# Patient Record
Sex: FEMALE | Race: WHITE | NOT HISPANIC OR LATINO
[De-identification: names, ages, dates, MRNs, and addresses within clinical notes are randomized per-mention and may not be internally consistent; named-entity substitution may affect disease eponyms.]

---

## 2020-11-25 ENCOUNTER — APPOINTMENT (OUTPATIENT)
Dept: OBGYN | Facility: CLINIC | Age: 45
End: 2020-11-25

## 2020-12-14 ENCOUNTER — OUTPATIENT (OUTPATIENT)
Dept: OUTPATIENT SERVICES | Facility: HOSPITAL | Age: 45
LOS: 1 days | Discharge: HOME | End: 2020-12-14
Payer: COMMERCIAL

## 2020-12-14 DIAGNOSIS — Z85.3 PERSONAL HISTORY OF MALIGNANT NEOPLASM OF BREAST: ICD-10-CM

## 2020-12-15 ENCOUNTER — RESULT REVIEW (OUTPATIENT)
Age: 45
End: 2020-12-15

## 2020-12-15 PROCEDURE — 88321 CONSLTJ&REPRT SLD PREP ELSWR: CPT

## 2020-12-16 LAB — SURGICAL PATHOLOGY STUDY: SIGNIFICANT CHANGE UP

## 2020-12-18 ENCOUNTER — APPOINTMENT (OUTPATIENT)
Dept: HEMATOLOGY ONCOLOGY | Facility: CLINIC | Age: 45
End: 2020-12-18

## 2021-01-28 ENCOUNTER — NON-APPOINTMENT (OUTPATIENT)
Age: 46
End: 2021-01-28

## 2021-01-29 ENCOUNTER — OUTPATIENT (OUTPATIENT)
Dept: OUTPATIENT SERVICES | Facility: HOSPITAL | Age: 46
LOS: 1 days | Discharge: HOME | End: 2021-01-29

## 2021-01-29 DIAGNOSIS — Z85.3 PERSONAL HISTORY OF MALIGNANT NEOPLASM OF BREAST: ICD-10-CM

## 2021-02-02 ENCOUNTER — APPOINTMENT (OUTPATIENT)
Dept: HEMATOLOGY ONCOLOGY | Facility: CLINIC | Age: 46
End: 2021-02-02
Payer: COMMERCIAL

## 2021-02-02 PROCEDURE — 99205 OFFICE O/P NEW HI 60 MIN: CPT | Mod: 95

## 2021-02-02 NOTE — HISTORY OF PRESENT ILLNESS
[Disease: _____________________] : Disease: [unfilled] [T: ___] : T[unfilled] [N: ___] : N[unfilled] [M: ___] : M[unfilled] [AJCC Stage: ____] : AJCC Stage: [unfilled] [de-identified] : Please see dictated initial consult note scanned into AEHR [de-identified] : ER+ SC+ her 2 lou -

## 2021-02-18 ENCOUNTER — APPOINTMENT (OUTPATIENT)
Dept: HEMATOLOGY ONCOLOGY | Facility: CLINIC | Age: 46
End: 2021-02-18
Payer: COMMERCIAL

## 2021-02-18 PROCEDURE — 99215 OFFICE O/P EST HI 40 MIN: CPT | Mod: 95

## 2021-02-19 NOTE — PHYSICAL EXAM
[Fully active, able to carry on all pre-disease performance without restriction] : Status 0 - Fully active, able to carry on all pre-disease performance without restriction [Normal] : affect appropriate [de-identified] : sclerae anicteric [de-identified] : No labored breathing

## 2021-02-19 NOTE — HISTORY OF PRESENT ILLNESS
[Home] : at home, [unfilled] , at the time of the visit. [Medical Office: ([facility])___] : at the medical office located in  [Verbal consent obtained from patient] : the patient, [unfilled] [Disease: _____________________] : Disease: [unfilled] [T: ___] : T[unfilled] [N: ___] : N[unfilled] [M: ___] : M[unfilled] [AJCC Stage: ____] : AJCC Stage: [unfilled] [de-identified] : The patient has h/o chronic "cystic breasts."  \par \par Her history of present illness began with a breast self-examination in approximately the third week of 11/2020 when she noticed a palpable mass in the upper right breast just above the nipple area.  She called her gynecologist and ultimately scheduled a diagnostic mammogram on 11/25/2020 with the finding of a spiculated mass in the supra-areolar region of the right breast, mid level to the chest wall, with suspicious microcalcifications identified, extending for approximately 4 cm with a suggestion of retraction with extension to the anterior surface of the breast approximately 2 cm inferior to the mass, suspicious microcalcifications identified within the mass; there were scattered groups of calcifications also identified in the left breast with questionable areas of nodularity.  A bilateral breast ultrasound was performed on that same date with a finding of a 4 cm spiculated mass in the right breast, 12 o'clock axis, 1 cm from the nipple, with a note that although other irregular areas of nodularity were present, tissue sampling of this mass was advised under ultrasound-guided core biopsy; the left breast had a 3 mm slightly irregular hypoechoic nodule at the 4 o'clock axis, 6 cm from the nipple with no area of suspicious architectural distortion or acoustical shadowing; no abnormal adenopathy was seen in either axilla.  On 12/02/2020, the patient underwent a right breast 12 o'clock axis core biopsy with a finding of moderately differentiated invasive duct carcinoma with focal squamous differentiation, with core biopsy specimen measuring 1.2 cm, estrogen receptor positive (80%), progesterone receptor positive (30%), and HER-2/lou negative (1+ staining).  She subsequently saw Dr. Flor Alfonso who then ordered a bilateral breast MRI which was performed on 12/15/2020 with a finding of a spiculated enhancing mass in the right upper central breast, middle depth which measured at least 3 x 4 cm corresponding to the biopsy-proven malignancy; there was nodule enhancement extending inferior, medial, and superior-lateral to the mass, which most likely represented a hematoma from the recent biopsy although satellite lesions could not be excluded; no evidence of contralateral breast disease was noted.  \par \par The patient ultimately proceeded on to a bilateral mastectomy, with the right mastectomy specimen showing 2 foci of invasive duct carcinoma, poorly differentiated, spanning 35 and 14 mm, respectively (the large focus demonstrated focal squamous differentiation), Mia-SBR score 9, margins negative, with non-extensive DCIS noted, evidence of lymphovascular invasion was identified, and 1/1 sentinel lymph node with a micrometastasis (1.6 mm), and 1/1 sentinel lymph node returning negative for metastasis.\par \par The patient had germline cancer genetic testing with a BRCA1/2 analyses with [employer] [facility] [employer] Plus RNA Insight testing which returned with no pathogenic mutation.\par \par The patient was seen by Dr. Gina Jacob in consultation who recommended adjuvant dose-dense ACT per patient's verbal report; I do not have a copy of those reports for my review.\par \par She saw me in initial consultation on 2/2/21. I had an extensive discussion with the patient regarding her recently diagnosed T2 N1mi M0, stage IIA breast cancer, noting the potential implications of her pathologic prognostic factors on the subsequent risk of developing local and metastatic recurrence.  In light of the above, I recommended adjuvant antiestrogen therapy, with tamoxifen should she be chemically premenopausal versus an aromatase inhibitor such as anastrozole if she is chemically menopausal, noting the potential risks, benefits, anticipated side effects, as well as potential reduction of these two approaches regarding the risk of developing metastatic recurrence.  She has been amenorrheic for 13 months, and I have recommended she have an FSH, LH, and estradiol level to better determine whether she is fully postmenopausal or perimenopausal at this time prior to making definitive treatment recommendations regarding antiestrogen therapy.  In addition, I have recommended that she allow us to send her tumor off for Oncotype DX testing, noting the implications of this molecular profiling tool on the subsequent decision-making as to whether to add adjuvant chemotherapy to adjuvant antiestrogen therapy.  Should this return with a low-risk score, I noted that I would recommend only antiestrogen therapy.  Should it return with a high-risk score, I would recommend adding adjuvant chemotherapy, specifically preliminarily discussing adjuvant TC chemotherapy every 3 weeks for 4 cycles with Onpro for hematologic support noting the potential risks, benefits, and anticipated side effects.  The option of using scalp cooling technologies to diminish or avoid chemotherapy-induced alopecia was also preliminarily discussed.  The patient has asked me why I did not recommend dose-dense ACT chemotherapy as Dr. Jacob had done, and I noted that it is a fine regimen, for which there is strong evidence for potential benefit in women with triple negative breast cancer, and those with lymph node positive breast cancer.  As a matter fact, from the recent TAILORx study, there is a minimal difference between TC and dose-dense ACT. \par \par The patient agreed to have her tumor tested for Oncotype DX and I requested for that assay to be sent/performed.  \par  [de-identified] : ER+ MS+ her 2 lou - [de-identified] : In the interim The OncotypeDX returned with a RS 31 = 24% ROR at 9  years with GUTIERREZ or AI alone and a group average absolute chemotherapy benefit of ~ 15%. \par \par The pt is seen today to discuss these results and treatment recommendations.\par \par She notes a good appetite stable weight and excellent performance status.

## 2021-02-22 ENCOUNTER — NON-APPOINTMENT (OUTPATIENT)
Age: 46
End: 2021-02-22

## 2021-02-23 ENCOUNTER — RESULT REVIEW (OUTPATIENT)
Age: 46
End: 2021-02-23

## 2021-02-23 ENCOUNTER — APPOINTMENT (OUTPATIENT)
Dept: HEMATOLOGY ONCOLOGY | Facility: CLINIC | Age: 46
End: 2021-02-23
Payer: COMMERCIAL

## 2021-02-23 VITALS
SYSTOLIC BLOOD PRESSURE: 109 MMHG | TEMPERATURE: 97.4 F | BODY MASS INDEX: 20.32 KG/M2 | DIASTOLIC BLOOD PRESSURE: 75 MMHG | HEART RATE: 76 BPM | RESPIRATION RATE: 17 BRPM | HEIGHT: 65.24 IN | WEIGHT: 123.46 LBS | OXYGEN SATURATION: 99 %

## 2021-02-23 LAB
BASOPHILS # BLD AUTO: 0.04 K/UL — SIGNIFICANT CHANGE UP (ref 0–0.2)
BASOPHILS NFR BLD AUTO: 0.7 % — SIGNIFICANT CHANGE UP (ref 0–2)
EOSINOPHIL # BLD AUTO: 0.15 K/UL — SIGNIFICANT CHANGE UP (ref 0–0.5)
EOSINOPHIL NFR BLD AUTO: 2.5 % — SIGNIFICANT CHANGE UP (ref 0–6)
HCT VFR BLD CALC: 39.6 % — SIGNIFICANT CHANGE UP (ref 34.5–45)
HGB BLD-MCNC: 13.2 G/DL — SIGNIFICANT CHANGE UP (ref 11.5–15.5)
IMM GRANULOCYTES NFR BLD AUTO: 0.3 % — SIGNIFICANT CHANGE UP (ref 0–1.5)
LYMPHOCYTES # BLD AUTO: 0.96 K/UL — LOW (ref 1–3.3)
LYMPHOCYTES # BLD AUTO: 15.7 % — SIGNIFICANT CHANGE UP (ref 13–44)
MCHC RBC-ENTMCNC: 29.6 PG — SIGNIFICANT CHANGE UP (ref 27–34)
MCHC RBC-ENTMCNC: 33.3 G/DL — SIGNIFICANT CHANGE UP (ref 32–36)
MCV RBC AUTO: 88.8 FL — SIGNIFICANT CHANGE UP (ref 80–100)
MONOCYTES # BLD AUTO: 0.5 K/UL — SIGNIFICANT CHANGE UP (ref 0–0.9)
MONOCYTES NFR BLD AUTO: 8.2 % — SIGNIFICANT CHANGE UP (ref 2–14)
NEUTROPHILS # BLD AUTO: 4.45 K/UL — SIGNIFICANT CHANGE UP (ref 1.8–7.4)
NEUTROPHILS NFR BLD AUTO: 72.6 % — SIGNIFICANT CHANGE UP (ref 43–77)
NRBC # BLD: 0 /100 WBCS — SIGNIFICANT CHANGE UP (ref 0–0)
PLATELET # BLD AUTO: 188 K/UL — SIGNIFICANT CHANGE UP (ref 150–400)
RBC # BLD: 4.46 M/UL — SIGNIFICANT CHANGE UP (ref 3.8–5.2)
RBC # FLD: 12.8 % — SIGNIFICANT CHANGE UP (ref 10.3–14.5)
WBC # BLD: 6.12 K/UL — SIGNIFICANT CHANGE UP (ref 3.8–10.5)
WBC # FLD AUTO: 6.12 K/UL — SIGNIFICANT CHANGE UP (ref 3.8–10.5)

## 2021-02-23 PROCEDURE — 99214 OFFICE O/P EST MOD 30 MIN: CPT

## 2021-02-23 PROCEDURE — 99072 ADDL SUPL MATRL&STAF TM PHE: CPT

## 2021-02-24 ENCOUNTER — OUTPATIENT (OUTPATIENT)
Dept: OUTPATIENT SERVICES | Facility: HOSPITAL | Age: 46
LOS: 1 days | Discharge: HOME | End: 2021-02-24

## 2021-02-24 DIAGNOSIS — Z85.3 PERSONAL HISTORY OF MALIGNANT NEOPLASM OF BREAST: ICD-10-CM

## 2021-02-24 LAB
ALBUMIN SERPL ELPH-MCNC: 4.7 G/DL
ALP BLD-CCNC: 66 U/L
ALT SERPL-CCNC: 11 U/L
ANION GAP SERPL CALC-SCNC: 13 MMOL/L
APTT BLD: 36.5 SEC
AST SERPL-CCNC: 17 U/L
BILIRUB SERPL-MCNC: 0.2 MG/DL
BUN SERPL-MCNC: 10 MG/DL
CALCIUM SERPL-MCNC: 9.8 MG/DL
CHLORIDE SERPL-SCNC: 102 MMOL/L
CO2 SERPL-SCNC: 25 MMOL/L
CREAT SERPL-MCNC: 0.7 MG/DL
GLUCOSE SERPL-MCNC: 101 MG/DL
HBV CORE IGG+IGM SER QL: NONREACTIVE
HBV SURFACE AB SER QL: NONREACTIVE
HBV SURFACE AG SER QL: NONREACTIVE
HCV AB SER QL: NONREACTIVE
HCV S/CO RATIO: 0.08 S/CO
INR PPP: 1.08 RATIO
POTASSIUM SERPL-SCNC: 4.5 MMOL/L
PROT SERPL-MCNC: 7.3 G/DL
PT BLD: 12.7 SEC
SARS-COV-2 N GENE NPH QL NAA+PROBE: NOT DETECTED
SODIUM SERPL-SCNC: 140 MMOL/L

## 2021-02-25 ENCOUNTER — APPOINTMENT (OUTPATIENT)
Dept: CARDIOLOGY | Facility: CLINIC | Age: 46
End: 2021-02-25
Payer: COMMERCIAL

## 2021-02-25 PROCEDURE — 99072 ADDL SUPL MATRL&STAF TM PHE: CPT

## 2021-02-25 PROCEDURE — 93306 TTE W/DOPPLER COMPLETE: CPT

## 2021-02-25 PROCEDURE — 93356 MYOCRD STRAIN IMG SPCKL TRCK: CPT

## 2021-02-26 ENCOUNTER — RESULT REVIEW (OUTPATIENT)
Age: 46
End: 2021-02-26

## 2021-02-26 ENCOUNTER — OUTPATIENT (OUTPATIENT)
Dept: OUTPATIENT SERVICES | Facility: HOSPITAL | Age: 46
LOS: 1 days | End: 2021-02-26
Payer: COMMERCIAL

## 2021-02-26 VITALS
OXYGEN SATURATION: 100 % | WEIGHT: 121.92 LBS | HEART RATE: 95 BPM | HEIGHT: 65 IN | RESPIRATION RATE: 16 BRPM | TEMPERATURE: 98 F | DIASTOLIC BLOOD PRESSURE: 78 MMHG | SYSTOLIC BLOOD PRESSURE: 120 MMHG

## 2021-02-26 VITALS
RESPIRATION RATE: 17 BRPM | DIASTOLIC BLOOD PRESSURE: 61 MMHG | SYSTOLIC BLOOD PRESSURE: 113 MMHG | OXYGEN SATURATION: 100 % | HEART RATE: 64 BPM

## 2021-02-26 DIAGNOSIS — C50.911 MALIGNANT NEOPLASM OF UNSPECIFIED SITE OF RIGHT FEMALE BREAST: ICD-10-CM

## 2021-02-26 PROCEDURE — C1788: CPT

## 2021-02-26 PROCEDURE — 76937 US GUIDE VASCULAR ACCESS: CPT

## 2021-02-26 PROCEDURE — C1769: CPT

## 2021-02-26 PROCEDURE — 77001 FLUOROGUIDE FOR VEIN DEVICE: CPT

## 2021-02-26 PROCEDURE — C1894: CPT

## 2021-02-26 PROCEDURE — 77001 FLUOROGUIDE FOR VEIN DEVICE: CPT | Mod: 26

## 2021-02-26 PROCEDURE — 36561 INSERT TUNNELED CV CATH: CPT

## 2021-02-26 PROCEDURE — 76937 US GUIDE VASCULAR ACCESS: CPT | Mod: 26

## 2021-02-26 RX ORDER — SODIUM CHLORIDE 9 MG/ML
1000 INJECTION, SOLUTION INTRAVENOUS
Refills: 0 | Status: DISCONTINUED | OUTPATIENT
Start: 2021-02-26 | End: 2021-03-12

## 2021-02-26 NOTE — ASU DISCHARGE PLAN (ADULT/PEDIATRIC) - NURSING INSTRUCTIONS
Please feel free to contact us at [phone] if any problems arise. After 6PM, Monday through Friday, on weekends and on holidays, please call [phone] and ask for the radiology resident on call to be paged.

## 2021-02-26 NOTE — PRE PROCEDURE NOTE - PRE PROCEDURE EVALUATION
Interventional Radiology Pre-Procedure Note    This is a 45y F with right breast cancer presenting for a left chest wall port insertion.     Procedure: Left chest wall venous access port insertion.    Diagnosis/Indication: Patient is a 45y old  Female who presents with a chief complaint of breast cancer    PAST MEDICAL & SURGICAL HISTORY:       Female    Allergies: amoxicillin (Rash)      LABS:      (see chart)      Plan:  -Left chest wall port insertion.  -Procedure/ risks/ benefits were explained, informed consent obtained from patient, verbalizes understanding.

## 2021-02-26 NOTE — ASU PATIENT PROFILE, ADULT - NS PRO LAST MENSTRUAL
Patient Education     Bacterial Vaginosis    You have a vaginal infection called bacterial vaginosis (BV). Both good and bad bacteria are present in a healthy vagina. BV occurs when these bacteria get out of balance. The number of bad bacteria increase. And the number of good bacteria decrease. Although BV is associated with sexual activity, it is not a sexually transmitted disease.  BV may or may not cause symptoms. If symptoms do occur, they can include:  · Thin, gray, milky-white, or sometimes green discharge  · Unpleasant odor or “fishy” smell  · Itching, burning, or pain in or around the vagina  It is not known what causes BV, but certain factors can make the problem more likely. This can include:  · Douching  · Having sex with a new partner  · Having sex with more than one partner  BV will sometimes go away on its own. But treatment is usually recommended. This is because untreated BV can increase the risk of more serious health problems such as:  · Pelvic inflammatory disease (PID)  ·  delivery (giving birth to a baby early if you’re pregnant)  · HIV and certain other sexually transmitted diseases (STDs)  · Infection after surgery on the reproductive organs  Home care  General care  · BV is most often treated with medicines called antibiotics. These may be given as pills or as a vaginal cream. If antibiotics are prescribed, be sure to use them exactly as directed. Also, be sure to complete all of the medicine, even if your symptoms go away.  · Don't douche or having sex during treatment.  · If you have sex with a female partner, ask your healthcare provider if she should also be treated.  Prevention  · Don't douche.  · Don't have sex. If you do have sex, then take steps to lower your risk:  ? Use condoms when having sex.  ? Limit the number of sexual partners you have.  Follow-up care  Follow up with your healthcare provider, or as advised.  When to seek medical advice  Call your healthcare provider  over a year ago right away if:  · You have a fever of 100.4ºF (38ºC) or higher, or as directed by your provider.  · Your symptoms worsen, or they don’t go away within a few days of starting treatment.  · You have new pain in the lower belly or pelvic region.  · You have side effects that bother you or a reaction to the pills or cream you’re prescribed.  · You or any partners you have sex with have new symptoms, such as a rash, joint pain, or sores.  Date Last Reviewed: 10/1/2017  © 6640-6168 [employer]. [address] [place], [place] [id number]. All rights reserved. This information is not intended as a substitute for professional medical care. Always follow your healthcare professional's instructions.           Patient Education     Preventing Vaginal Infection  These steps can help you stay comfortable during treatment of a vaginal infection. They also help prevent vaginal infections in the future.  Keeping a healthy balance  Factors that change the normal balance in the vagina can lead to a vaginal infection. To help keep the balance normal, try these tips:  · Change out of wet bathing suits and damp exercise clothes as soon as possible. Yeast thrive in a warm, moist environment.  · Avoid wearing tight pants. Choose cotton underwear and pantyhose that have a cotton crotch. Cotton keeps you cooler and drier than synthetics.  · Don't douche unless directed by your healthcare provider. Douching can destroy friendly bacteria and change the vagina's normal balance.  · Wipe from front to back after using the toilet. This prevents bacteria from spreading from the anus to the vulva.  · Wash the vulva with mild, unscented soap or with plain water.  · Wash your diaphragm, spermicide applicators, and sex toys with mild soap and water after use. Dry them thoroughly before putting them away.  · Change tampons often (every 2 hours to 4 hours). Leaving a tampon in for too long may disrupt the balance of vaginal  bacteria.  · Avoid vaginal sprays, scented toilet paper and soaps, and deodorant tampons or pads, which can cause vaginal irritation  Staying healthy overall  Good overall health can help you resist infection. To be healthier:  · Help protect yourself from STIs (sexually transmitted infections) by using latex condoms for intercourse. Ask your healthcare provider for more information about safer sex.  · Eat a variety of healthy foods.  · Exercise regularly.  · Get enough rest and sleep.  · Maintain a healthy weight. If you need to lose weight, ask your healthcare provider for advice on how to start.  Date Last Reviewed: 9/1/2017 © 2000-2018 [employer]. [address] [place] [address], [place] [address]. All rights reserved. This information is not intended as a substitute for professional medical care. Always follow your healthcare professional's instructions.           Patient Education     Why Have a Pap Test?  Early on, cervical changes don't cause symptoms. Often, the only way to know you have cervical changes is to do a Pap test. A Pap test can find these problems early, when they are easier to treat. Pap tests can also detect some infections of the cervix and vagina.  What is a Pap test?  A Pap test is a procedure that helps find changes in the cervix that may lead to cancer. The cervix is the part of the uterus that opens into the vagina. For this test, a small sample of cells is taken from the cervix. This is done in your healthcare provider’s office. The cells are then analyzed in a lab. A Pap test is a safe procedure. It takes just a few minutes and causes little or no discomfort.  The HPV connection  Human papillomavirus or HPV is a family of viruses that spread through skin contact. Certain types are almost always spread through sexual contact. Some HPV types cause genital warts (condyloma). But not all types of HPV cause visible symptoms. Certain types cause cell changes (dysplasia) in the cervix  that can lead to cancer. In fact, HPV infection is the most important risk factor for cervical cancer. Healthcare providers can now test for HPV. Testing for HPV is often done with the Pap test. That’s why it’s important to have Pap tests as recommended by your healthcare provider. This helps ensure that any abnormal cells will be found and treated before they become cancerous.  Who should have a Pap test and HPV test?  Ask your healthcare provider when to start having Pap tests, whether you should have an HPV test done at the same time, and how often to have them. Follow these guidelines from the American Cancer Society for cervical cancer screening:  · A first Pap test at age 21. And then every 3 years until age 29, HPV testing is not recommended during this time, though it may be done to follow-up on an abnormal Pap test.  · Starting at age 30, the preferred testing is a Pap test done with an HPV test every 5 years. This should be done until age 65. Another option for women in this 30 to 65 age group is to have just the Pap test done every 3 years.  · You may need a different screening schedule if you are at high risk for cervical cancer. Risk factors include having HIV, a weak immune system, or exposure to the medicine CRESCENCIO while your mother was pregnant with you. Talk with your healthcare provider about the best schedule for you.  · If you’re over 65 and have had regular screenings for the last 10 years with no abnormal results in the last 20 years, you may stop cervical cancer screening.  · If you had a hysterectomy that included removing your cervix, you can stop screening unless the hysterectomy was done to treat cervical cancer or pre-cancer. If you still have your cervix after the hysterectomy, you should continue screening according to the above guidelines.  · Routine testing does not need to be done each year. However, if your test is abnormal, your provider will let you know how often to be  tested.   · Women who have been vaccinated for HPV should still follow these guidelines.  · If you have had cervical cancer, talk to your healthcare provider about the screening plan that's best for you.  Date Last Reviewed: 9/1/2017 © 2000-2018 [employer]. [address] [place] [id number]. All rights reserved. This information is not intended as a substitute for professional medical care. Always follow your healthcare professional's instructions.           Patient Education     Pap  Does this test have other names?  Pap smear, cervical cytology, Papanicolaou test, Pap smear test, vaginal smear technique  What is this test?  This test checks the cells from inside the cervix for any changes that could lead to cancer. The cervix is the lower part of a woman's uterus that opens into the vagina.  This test is named after Marino Boggs MD, one of the healthcare providers who developed this technique of testing for cervical cancer.  Why do I need this test?  You may need this test as a screening test to look for cervical cancer or changes in cervical cells that might eventually lead to cancer. Major medical groups generally recommend that women get regular Pap tests every 3 years starting at age 21. Getting a regular Pap test can be life-saving. Cervical cancer is one of the most serious types of cancer in women.  If your test shows abnormal cells, your healthcare provider may be able to find and treat cervical problems right away, or stop cervical cancer before it becomes life-threatening. Pap tests can also diagnose serious infections and pelvic inflammation.   What other tests might I have along with this test?  You will likely have a pelvic exam along with this test. Depending on your age and other factors, your tissue samples may also be tested for human papillomavirus (HPV) infection at the same time your Pap test is done. Infection with some types of HPV puts you at risk for  cervical cancer.  If you have an abnormal Pap test result, your healthcare provider may order other tests. These may include:  · Colposcopy. Your cervix and vagina are looked at with a microscope called a colposcope, which magnifies any abnormal areas.  · Endocervical curettage. Cells are taken from the opening of your cervix with a spoon-shaped tool and looked at under a microscope. This may be done during the colposcopy.  · Biopsy. A small tissue sample is taken from your cervix and looked at under a microscope. This may be done during the colposcopy.   What do my test results mean?  Test results may vary depending on your age, gender, health history, the method used for the test, and other things. Your test results may not mean you have a problem. Ask your healthcare provider what your test results mean for you.   Your results will either be normal or abnormal. If you get an abnormal result, this usually does not mean that you have cancer. It often means a minor cervical problem. Your healthcare provider may do another Pap test to confirm the initial results. Or he or she may recommend other tests such as colposcopy.  Occasionally a lab test has a false-positive result. This means you do not have a cervical problem even though the test result shows you do.   How is this test done?  This test is done with a sample of cervical cells. For the test, you lie on your back with your knees bent and your feet in stirrups, then relax and spread your legs. As part of a pelvic exam, your healthcare provider first checks your vagina and reproductive organs for infections and health problems.  Then your provider uses a device called a speculum to open the vagina. The provider examines your cervix and scrapes off a few cells from inside your cervix.  Some women may have slight discomfort when the speculum is inserted.  Does this test pose any risks?  This test poses no known risks.  What might affect my test results?  Using  vaginal lubricants, cleansers, contraceptives, or creams may mask your symptoms. Avoid using vaginal douches and abstain from sex for 2 days before an exam. Using these products or having sex may wash away or disguise abnormal cervical cells.  How do I get ready for this test?  It may seem like a good idea to wash up before having a Pap test, but this can actually erase the signs of a health problem. For accurate test results, avoid having sex or using tampons, douches, vaginal creams, deodorant sprays and powders, and contraceptive foams and jellies for 2 days before your exam.  Don't have the test while you're menstruating. The ideal time to have a Pap test is 10 to 20 days after the first day of your last period.   Be sure your healthcare provider knows about all medicines, herbs, vitamins, and supplements you are taking. This includes medicines that don't need a prescription and any illicit drugs you may use.   © 3681-5459 [employer]. [address] [place] [address], [place] [address]. All rights reserved. This information is not intended as a substitute for professional medical care. Always follow your healthcare professional's instructions.

## 2021-03-01 ENCOUNTER — APPOINTMENT (OUTPATIENT)
Dept: HEMATOLOGY ONCOLOGY | Facility: CLINIC | Age: 46
End: 2021-03-01
Payer: COMMERCIAL

## 2021-03-01 DIAGNOSIS — Z23 ENCOUNTER FOR IMMUNIZATION: ICD-10-CM

## 2021-03-01 DIAGNOSIS — Z98.890 OTHER SPECIFIED POSTPROCEDURAL STATES: ICD-10-CM

## 2021-03-01 PROCEDURE — 90791 PSYCH DIAGNOSTIC EVALUATION: CPT | Mod: 95

## 2021-03-02 PROBLEM — Z23 COVID-19 VACCINE ADMINISTERED: Status: ACTIVE | Noted: 2021-03-01

## 2021-03-03 ENCOUNTER — RESULT REVIEW (OUTPATIENT)
Age: 46
End: 2021-03-03

## 2021-03-03 ENCOUNTER — APPOINTMENT (OUTPATIENT)
Dept: INFUSION THERAPY | Facility: HOSPITAL | Age: 46
End: 2021-03-03

## 2021-03-03 DIAGNOSIS — Z51.89 ENCOUNTER FOR OTHER SPECIFIED AFTERCARE: ICD-10-CM

## 2021-03-03 DIAGNOSIS — Z51.11 ENCOUNTER FOR ANTINEOPLASTIC CHEMOTHERAPY: ICD-10-CM

## 2021-03-03 DIAGNOSIS — R11.2 NAUSEA WITH VOMITING, UNSPECIFIED: ICD-10-CM

## 2021-03-03 LAB
BASOPHILS # BLD AUTO: 0.05 K/UL — SIGNIFICANT CHANGE UP (ref 0–0.2)
BASOPHILS NFR BLD AUTO: 0.9 % — SIGNIFICANT CHANGE UP (ref 0–2)
EOSINOPHIL # BLD AUTO: 0.41 K/UL — SIGNIFICANT CHANGE UP (ref 0–0.5)
EOSINOPHIL NFR BLD AUTO: 7.6 % — HIGH (ref 0–6)
HCT VFR BLD CALC: 39.6 % — SIGNIFICANT CHANGE UP (ref 34.5–45)
HGB BLD-MCNC: 13.4 G/DL — SIGNIFICANT CHANGE UP (ref 11.5–15.5)
IMM GRANULOCYTES NFR BLD AUTO: 2.4 % — HIGH (ref 0–1.5)
LYMPHOCYTES # BLD AUTO: 1.58 K/UL — SIGNIFICANT CHANGE UP (ref 1–3.3)
LYMPHOCYTES # BLD AUTO: 29.2 % — SIGNIFICANT CHANGE UP (ref 13–44)
MCHC RBC-ENTMCNC: 29.2 PG — SIGNIFICANT CHANGE UP (ref 27–34)
MCHC RBC-ENTMCNC: 33.8 G/DL — SIGNIFICANT CHANGE UP (ref 32–36)
MCV RBC AUTO: 86.3 FL — SIGNIFICANT CHANGE UP (ref 80–100)
MONOCYTES # BLD AUTO: 0.78 K/UL — SIGNIFICANT CHANGE UP (ref 0–0.9)
MONOCYTES NFR BLD AUTO: 14.4 % — HIGH (ref 2–14)
NEUTROPHILS # BLD AUTO: 2.47 K/UL — SIGNIFICANT CHANGE UP (ref 1.8–7.4)
NEUTROPHILS NFR BLD AUTO: 45.5 % — SIGNIFICANT CHANGE UP (ref 43–77)
NRBC # BLD: 0 /100 WBCS — SIGNIFICANT CHANGE UP (ref 0–0)
PLATELET # BLD AUTO: 200 K/UL — SIGNIFICANT CHANGE UP (ref 150–400)
RBC # BLD: 4.59 M/UL — SIGNIFICANT CHANGE UP (ref 3.8–5.2)
RBC # FLD: 12.5 % — SIGNIFICANT CHANGE UP (ref 10.3–14.5)
WBC # BLD: 5.42 K/UL — SIGNIFICANT CHANGE UP (ref 3.8–10.5)
WBC # FLD AUTO: 5.42 K/UL — SIGNIFICANT CHANGE UP (ref 3.8–10.5)

## 2021-03-04 ENCOUNTER — NON-APPOINTMENT (OUTPATIENT)
Age: 46
End: 2021-03-04

## 2021-03-04 DIAGNOSIS — C50.919 MALIGNANT NEOPLASM OF UNSPECIFIED SITE OF UNSPECIFIED FEMALE BREAST: ICD-10-CM

## 2021-03-04 DIAGNOSIS — R11.2 NAUSEA WITH VOMITING, UNSPECIFIED: ICD-10-CM

## 2021-03-04 DIAGNOSIS — Z51.11 ENCOUNTER FOR ANTINEOPLASTIC CHEMOTHERAPY: ICD-10-CM

## 2021-03-04 DIAGNOSIS — Z51.89 ENCOUNTER FOR OTHER SPECIFIED AFTERCARE: ICD-10-CM

## 2021-03-04 PROBLEM — K58.9 IRRITABLE BOWEL SYNDROME, UNSPECIFIED: Chronic | Status: ACTIVE | Noted: 2021-03-01

## 2021-03-04 PROBLEM — K58.9 IRRITABLE BOWEL SYNDROME WITHOUT DIARRHEA: Chronic | Status: ACTIVE | Noted: 2021-03-01

## 2021-03-05 ENCOUNTER — APPOINTMENT (OUTPATIENT)
Dept: HEMATOLOGY ONCOLOGY | Facility: CLINIC | Age: 46
End: 2021-03-05

## 2021-03-05 DIAGNOSIS — Z45.2 ENCOUNTER FOR ADJUSTMENT AND MANAGEMENT OF VASCULAR ACCESS DEVICE: ICD-10-CM

## 2021-03-05 DIAGNOSIS — C50.911 MALIGNANT NEOPLASM OF UNSPECIFIED SITE OF RIGHT FEMALE BREAST: ICD-10-CM

## 2021-03-05 DIAGNOSIS — F43.23 ADJUSTMENT DISORDER WITH MIXED ANXIETY AND DEPRESSED MOOD: ICD-10-CM

## 2021-03-08 NOTE — HISTORY OF PRESENT ILLNESS
[Disease: _____________________] : Disease: [unfilled] [T: ___] : T[unfilled] [N: ___] : N[unfilled] [M: ___] : M[unfilled] [AJCC Stage: ____] : AJCC Stage: [unfilled] [Home] : at home, [unfilled] , at the time of the visit. [Medical Office: ([facility])___] : at the medical office located in  [Verbal consent obtained from patient] : the patient, [unfilled] [de-identified] : The patient has h/o chronic "cystic breasts."  \par \par Her history of present illness began with a breast self-examination in approximately the third week of 11/2020 when she noticed a palpable mass in the upper right breast just above the nipple area.  She called her gynecologist and ultimately scheduled a diagnostic mammogram on 11/25/2020 with the finding of a spiculated mass in the supra-areolar region of the right breast, mid level to the chest wall, with suspicious microcalcifications identified, extending for approximately 4 cm with a suggestion of retraction with extension to the anterior surface of the breast approximately 2 cm inferior to the mass, suspicious microcalcifications identified within the mass; there were scattered groups of calcifications also identified in the left breast with questionable areas of nodularity.  A bilateral breast ultrasound was performed on that same date with a finding of a 4 cm spiculated mass in the right breast, 12 o'clock axis, 1 cm from the nipple, with a note that although other irregular areas of nodularity were present, tissue sampling of this mass was advised under ultrasound-guided core biopsy; the left breast had a 3 mm slightly irregular hypoechoic nodule at the 4 o'clock axis, 6 cm from the nipple with no area of suspicious architectural distortion or acoustical shadowing; no abnormal adenopathy was seen in either axilla.  On 12/02/2020, the patient underwent a right breast 12 o'clock axis core biopsy with a finding of moderately differentiated invasive duct carcinoma with focal squamous differentiation, with core biopsy specimen measuring 1.2 cm, estrogen receptor positive (80%), progesterone receptor positive (30%), and HER-2/lou negative (1+ staining).  She subsequently saw Dr. Flor Alfonso who then ordered a bilateral breast MRI which was performed on 12/15/2020 with a finding of a spiculated enhancing mass in the right upper central breast, middle depth which measured at least 3 x 4 cm corresponding to the biopsy-proven malignancy; there was nodule enhancement extending inferior, medial, and superior-lateral to the mass, which most likely represented a hematoma from the recent biopsy although satellite lesions could not be excluded; no evidence of contralateral breast disease was noted.  \par \par The patient ultimately proceeded on to a bilateral mastectomy, with the right mastectomy specimen showing 2 foci of invasive duct carcinoma, poorly differentiated, spanning 35 and 14 mm, respectively (the large focus demonstrated focal squamous differentiation), Mia-SBR score 9, margins negative, with non-extensive DCIS noted, evidence of lymphovascular invasion was identified, and 1/1 sentinel lymph node with a micrometastasis (1.6 mm), and 1/1 sentinel lymph node returning negative for metastasis.\par \par The patient had germline cancer genetic testing with a BRCA1/2 analyses with [employer] RNA Insight testing which returned with no pathogenic mutation.\par \par The patient was seen by Dr. Gina Jacob in consultation who recommended adjuvant dose-dense ACT per patient's verbal report; I do not have a copy of those reports for my review.\par \par She saw me in initial consultation on 2/2/21. I had an extensive discussion with the patient regarding her recently diagnosed T2 N1mi M0, stage IIA breast cancer, noting the potential implications of her pathologic prognostic factors on the subsequent risk of developing local and metastatic recurrence.  In light of the above, I recommended adjuvant antiestrogen therapy, with tamoxifen should she be chemically premenopausal versus an aromatase inhibitor such as anastrozole if she is chemically menopausal, noting the potential risks, benefits, anticipated side effects, as well as potential reduction of these two approaches regarding the risk of developing metastatic recurrence.  She has been amenorrheic for 13 months, and I have recommended she have an FSH, LH, and estradiol level to better determine whether she is fully postmenopausal or perimenopausal at this time prior to making definitive treatment recommendations regarding antiestrogen therapy.  In addition, I have recommended that she allow us to send her tumor off for Oncotype DX testing, noting the implications of this molecular profiling tool on the subsequent decision-making as to whether to add adjuvant chemotherapy to adjuvant antiestrogen therapy.  Should this return with a low-risk score, I noted that I would recommend only antiestrogen therapy.  Should it return with a high-risk score, I would recommend adding adjuvant chemotherapy, specifically preliminarily discussing adjuvant TC chemotherapy every 3 weeks for 4 cycles with Onpro for hematologic support noting the potential risks, benefits, and anticipated side effects.  The option of using scalp cooling technologies to diminish or avoid chemotherapy-induced alopecia was also preliminarily discussed.  The patient has asked me why I did not recommend dose-dense ACT chemotherapy as Dr. Jacob had done, and I noted that it is a fine regimen, for which there is strong evidence for potential benefit in women with triple negative breast cancer, and those with lymph node positive breast cancer.  As a matter fact, from the recent TAILORx study, there is a minimal difference between TC and dose-dense ACT. \par \par The patient agreed to have her tumor tested for Oncotype DX and I requested for that assay to be sent/performed.  \par  [de-identified] : ER+ [place]+ her 2 lou - [de-identified] : In the interim The OncotypeDX returned with a RS 31 = 24% ROR at 9  years with GUTIERREZ or AI alone and a group average absolute chemotherapy benefit of ~ 15%. \par REsults previously discussed on the phone, she is here today to discuss the treatment options again, and to make a decision on how to proceed.\par She is anxious regarding the prospect of starting chemotherapy and the risk of  recurrence.\par Otherwise well. Notes a good appetite, stable weight and an excellent perfromance status.\par

## 2021-03-08 NOTE — PHYSICAL EXAM
[Fully active, able to carry on all pre-disease performance without restriction] : Status 0 - Fully active, able to carry on all pre-disease performance without restriction [Normal] : grossly intact [de-identified] : s/p b/l mastectomies with reconstruction. Wound incisions healing well. [de-identified] : Tearful at times

## 2021-03-08 NOTE — END OF VISIT
[Time Spent: ___ minutes] : I have spent [unfilled] minutes of time on the encounter. [FreeTextEntry3] : seen and discussed with

## 2021-03-11 ENCOUNTER — NON-APPOINTMENT (OUTPATIENT)
Age: 46
End: 2021-03-11

## 2021-03-15 ENCOUNTER — NON-APPOINTMENT (OUTPATIENT)
Age: 46
End: 2021-03-15

## 2021-03-17 ENCOUNTER — RESULT REVIEW (OUTPATIENT)
Age: 46
End: 2021-03-17

## 2021-03-17 ENCOUNTER — APPOINTMENT (OUTPATIENT)
Dept: HEMATOLOGY ONCOLOGY | Facility: CLINIC | Age: 46
End: 2021-03-17
Payer: COMMERCIAL

## 2021-03-17 ENCOUNTER — APPOINTMENT (OUTPATIENT)
Dept: INFUSION THERAPY | Facility: HOSPITAL | Age: 46
End: 2021-03-17

## 2021-03-17 VITALS
SYSTOLIC BLOOD PRESSURE: 101 MMHG | DIASTOLIC BLOOD PRESSURE: 69 MMHG | OXYGEN SATURATION: 99 % | WEIGHT: 120.37 LBS | BODY MASS INDEX: 19.81 KG/M2 | HEART RATE: 86 BPM | HEIGHT: 65.24 IN | TEMPERATURE: 97.2 F | RESPIRATION RATE: 17 BRPM

## 2021-03-17 LAB
BASOPHILS # BLD AUTO: 0 K/UL — SIGNIFICANT CHANGE UP (ref 0–0.2)
BASOPHILS NFR BLD AUTO: 0 % — SIGNIFICANT CHANGE UP (ref 0–2)
EOSINOPHIL # BLD AUTO: 0 K/UL — SIGNIFICANT CHANGE UP (ref 0–0.5)
EOSINOPHIL NFR BLD AUTO: 0 % — SIGNIFICANT CHANGE UP (ref 0–6)
HCT VFR BLD CALC: 36.7 % — SIGNIFICANT CHANGE UP (ref 34.5–45)
HGB BLD-MCNC: 12.4 G/DL — SIGNIFICANT CHANGE UP (ref 11.5–15.5)
LYMPHOCYTES # BLD AUTO: 0.77 K/UL — LOW (ref 1–3.3)
LYMPHOCYTES # BLD AUTO: 12 % — LOW (ref 13–44)
MCHC RBC-ENTMCNC: 29.1 PG — SIGNIFICANT CHANGE UP (ref 27–34)
MCHC RBC-ENTMCNC: 33.8 G/DL — SIGNIFICANT CHANGE UP (ref 32–36)
MCV RBC AUTO: 86.2 FL — SIGNIFICANT CHANGE UP (ref 80–100)
MONOCYTES # BLD AUTO: 0.64 K/UL — SIGNIFICANT CHANGE UP (ref 0–0.9)
MONOCYTES NFR BLD AUTO: 10 % — SIGNIFICANT CHANGE UP (ref 2–14)
NEUTROPHILS # BLD AUTO: 4.99 K/UL — SIGNIFICANT CHANGE UP (ref 1.8–7.4)
NEUTROPHILS NFR BLD AUTO: 78 % — HIGH (ref 43–77)
NRBC # BLD: 0 /100 — SIGNIFICANT CHANGE UP (ref 0–0)
NRBC # BLD: SIGNIFICANT CHANGE UP /100 WBCS (ref 0–0)
PLAT MORPH BLD: NORMAL — SIGNIFICANT CHANGE UP
PLATELET # BLD AUTO: 144 K/UL — LOW (ref 150–400)
RBC # BLD: 4.26 M/UL — SIGNIFICANT CHANGE UP (ref 3.8–5.2)
RBC # FLD: 11.9 % — SIGNIFICANT CHANGE UP (ref 10.3–14.5)
RBC BLD AUTO: SIGNIFICANT CHANGE UP
WBC # BLD: 6.4 K/UL — SIGNIFICANT CHANGE UP (ref 3.8–10.5)
WBC # FLD AUTO: 6.4 K/UL — SIGNIFICANT CHANGE UP (ref 3.8–10.5)

## 2021-03-17 PROCEDURE — 99072 ADDL SUPL MATRL&STAF TM PHE: CPT

## 2021-03-17 PROCEDURE — 90834 PSYTX W PT 45 MINUTES: CPT

## 2021-03-17 PROCEDURE — 99214 OFFICE O/P EST MOD 30 MIN: CPT

## 2021-03-17 RX ORDER — ONDANSETRON 8 MG/1
8 TABLET ORAL EVERY 8 HOURS
Qty: 30 | Refills: 0 | Status: DISCONTINUED | COMMUNITY
Start: 2021-03-17 | End: 2021-03-17

## 2021-03-18 ENCOUNTER — NON-APPOINTMENT (OUTPATIENT)
Age: 46
End: 2021-03-18

## 2021-03-19 NOTE — PHYSICAL EXAM
[Fully active, able to carry on all pre-disease performance without restriction] : Status 0 - Fully active, able to carry on all pre-disease performance without restriction [Normal] : affect appropriate [de-identified] : s/p b/l mastectomies with reconstruction. Wound incisions healing well. [de-identified] : Tearful at times

## 2021-03-19 NOTE — HISTORY OF PRESENT ILLNESS
[Disease: _____________________] : Disease: [unfilled] [T: ___] : T[unfilled] [N: ___] : N[unfilled] [M: ___] : M[unfilled] [AJCC Stage: ____] : AJCC Stage: [unfilled] [de-identified] : The patient has h/o chronic "cystic breasts."  \par \par Her history of present illness began with a breast self-examination in approximately the third week of 11/2020 when she noticed a palpable mass in the upper right breast just above the nipple area.  She called her gynecologist and ultimately scheduled a diagnostic mammogram on 11/25/2020 with the finding of a spiculated mass in the supra-areolar region of the right breast, mid level to the chest wall, with suspicious microcalcifications identified, extending for approximately 4 cm with a suggestion of retraction with extension to the anterior surface of the breast approximately 2 cm inferior to the mass, suspicious microcalcifications identified within the mass; there were scattered groups of calcifications also identified in the left breast with questionable areas of nodularity.  A bilateral breast ultrasound was performed on that same date with a finding of a 4 cm spiculated mass in the right breast, 12 o'clock axis, 1 cm from the nipple, with a note that although other irregular areas of nodularity were present, tissue sampling of this mass was advised under ultrasound-guided core biopsy; the left breast had a 3 mm slightly irregular hypoechoic nodule at the 4 o'clock axis, 6 cm from the nipple with no area of suspicious architectural distortion or acoustical shadowing; no abnormal adenopathy was seen in either axilla.  On 12/02/2020, the patient underwent a right breast 12 o'clock axis core biopsy with a finding of moderately differentiated invasive duct carcinoma with focal squamous differentiation, with core biopsy specimen measuring 1.2 cm, estrogen receptor positive (80%), progesterone receptor positive (30%), and HER-2/lou negative (1+ staining).  She subsequently saw Dr. Flor Alfonso who then ordered a bilateral breast MRI which was performed on 12/15/2020 with a finding of a spiculated enhancing mass in the right upper central breast, middle depth which measured at least 3 x 4 cm corresponding to the biopsy-proven malignancy; there was nodule enhancement extending inferior, medial, and superior-lateral to the mass, which most likely represented a hematoma from the recent biopsy although satellite lesions could not be excluded; no evidence of contralateral breast disease was noted.  \par \par The patient ultimately proceeded on to a bilateral mastectomy, with the right mastectomy specimen showing 2 foci of invasive duct carcinoma, poorly differentiated, spanning 35 and 14 mm, respectively (the large focus demonstrated focal squamous differentiation), Mia-SBR score 9, margins negative, with non-extensive DCIS noted, evidence of lymphovascular invasion was identified, and 1/1 sentinel lymph node with a micrometastasis (1.6 mm), and 1/1 sentinel lymph node returning negative for metastasis.\par \par The patient had germline cancer genetic testing with a BRCA1/2 analyses with [employer] RNA Insight testing which returned with no pathogenic mutation.\par \par The patient was seen by Dr. Gina Jcaob in consultation who recommended adjuvant dose-dense ACT per patient's verbal report; I do not have a copy of those reports for my review.\par \par She saw me in initial consultation on 2/2/21. I had an extensive discussion with the patient regarding her recently diagnosed T2 N1mi M0, stage IIA breast cancer, noting the potential implications of her pathologic prognostic factors on the subsequent risk of developing local and metastatic recurrence.  In light of the above, I recommended adjuvant antiestrogen therapy, with tamoxifen should she be chemically premenopausal versus an aromatase inhibitor such as anastrozole if she is chemically menopausal, noting the potential risks, benefits, anticipated side effects, as well as potential reduction of these two approaches regarding the risk of developing metastatic recurrence.  She has been amenorrheic for 13 months, and I have recommended she have an FSH, LH, and estradiol level to better determine whether she is fully postmenopausal or perimenopausal at this time prior to making definitive treatment recommendations regarding antiestrogen therapy.  In addition, I have recommended that she allow us to send her tumor off for Oncotype DX testing, noting the implications of this molecular profiling tool on the subsequent decision-making as to whether to add adjuvant chemotherapy to adjuvant antiestrogen therapy.  Should this return with a low-risk score, I noted that I would recommend only antiestrogen therapy.  Should it return with a high-risk score, I would recommend adding adjuvant chemotherapy, specifically preliminarily discussing adjuvant TC chemotherapy every 3 weeks for 4 cycles with Onpro for hematologic support noting the potential risks, benefits, and anticipated side effects.  The option of using scalp cooling technologies to diminish or avoid chemotherapy-induced alopecia was also preliminarily discussed.  The patient has asked me why I did not recommend dose-dense ACT chemotherapy as Dr. Jacob had done, and I noted that it is a fine regimen, for which there is strong evidence for potential benefit in women with triple negative breast cancer, and those with lymph node positive breast cancer.  As a matter fact, from the recent TAILORx study, there is a minimal difference between TC and dose-dense ACT. \par In the interim The OncotypeDX returned with a RS 31 = 24% ROR at 9  years with GUTIERREZ or AI alone and a group average absolute chemotherapy benefit of ~ 15%. \par She wished to proceed with DD AC>>T. STarted on 3/3/21\par  [de-identified] : ER+ [place]+ her 2 lou - [de-identified] : On neoadjuvant chemotherapy regimen with dose dense doxorubicin/cyclophosphamide with onpro.\par S/p 1/4.\par \par 1. Extreme fatigue and exhaustion for for 3-4 days following treatment\par 2.On-call fellow post on C1D2 and was advised to take compazine around the clock, which she did x 3 days with consequent fatigue, dry mouth. Talked to her on the phone, regimen changed to prn. Now with improved symptoms. \par 3. Poor PO intake for 4-5 days following treatment. EAting a bland diet (rice cake, mashed potatoes and rice).\par ~ 3-4 lbs of weight loss. Now with an improved performance status, and starting to get back to her baseline.

## 2021-03-22 ENCOUNTER — NON-APPOINTMENT (OUTPATIENT)
Age: 46
End: 2021-03-22

## 2021-03-25 ENCOUNTER — APPOINTMENT (OUTPATIENT)
Dept: HEMATOLOGY ONCOLOGY | Facility: CLINIC | Age: 46
End: 2021-03-25
Payer: COMMERCIAL

## 2021-03-25 ENCOUNTER — NON-APPOINTMENT (OUTPATIENT)
Age: 46
End: 2021-03-25

## 2021-03-25 PROCEDURE — 90832 PSYTX W PT 30 MINUTES: CPT

## 2021-03-25 RX ORDER — DIPHENHYDRAMINE HYDROCHLORIDE AND LIDOCAINE HYDROCHLORIDE AND ALUMINUM HYDROXIDE AND MAGNESIUM HYDRO
KIT
Qty: 2 | Refills: 6 | Status: DISCONTINUED | COMMUNITY
Start: 2021-03-25 | End: 2021-03-25

## 2021-03-26 ENCOUNTER — APPOINTMENT (OUTPATIENT)
Dept: HEMATOLOGY ONCOLOGY | Facility: CLINIC | Age: 46
End: 2021-03-26
Payer: COMMERCIAL

## 2021-03-26 DIAGNOSIS — Z51.5 ENCOUNTER FOR PALLIATIVE CARE: ICD-10-CM

## 2021-03-26 PROCEDURE — 99214 OFFICE O/P EST MOD 30 MIN: CPT | Mod: 95

## 2021-03-26 RX ORDER — DIPHENHYDRAMINE HYDROCHLORIDE AND LIDOCAINE HYDROCHLORIDE AND ALUMINUM HYDROXIDE AND MAGNESIUM HYDRO
KIT
Qty: 2 | Refills: 6 | Status: DISCONTINUED | COMMUNITY
Start: 2021-03-25 | End: 2021-03-26

## 2021-03-27 ENCOUNTER — OUTPATIENT (OUTPATIENT)
Dept: OUTPATIENT SERVICES | Facility: HOSPITAL | Age: 46
LOS: 1 days | Discharge: HOME | End: 2021-03-27

## 2021-03-27 DIAGNOSIS — Z90.49 ACQUIRED ABSENCE OF OTHER SPECIFIED PARTS OF DIGESTIVE TRACT: Chronic | ICD-10-CM

## 2021-03-27 DIAGNOSIS — Z85.3 PERSONAL HISTORY OF MALIGNANT NEOPLASM OF BREAST: ICD-10-CM

## 2021-03-27 DIAGNOSIS — Z98.890 OTHER SPECIFIED POSTPROCEDURAL STATES: Chronic | ICD-10-CM

## 2021-03-31 ENCOUNTER — RESULT REVIEW (OUTPATIENT)
Age: 46
End: 2021-03-31

## 2021-03-31 ENCOUNTER — APPOINTMENT (OUTPATIENT)
Dept: INFUSION THERAPY | Facility: HOSPITAL | Age: 46
End: 2021-03-31

## 2021-03-31 ENCOUNTER — APPOINTMENT (OUTPATIENT)
Dept: HEMATOLOGY ONCOLOGY | Facility: CLINIC | Age: 46
End: 2021-03-31
Payer: COMMERCIAL

## 2021-03-31 VITALS
DIASTOLIC BLOOD PRESSURE: 71 MMHG | WEIGHT: 111.53 LBS | HEIGHT: 65.75 IN | BODY MASS INDEX: 18.14 KG/M2 | SYSTOLIC BLOOD PRESSURE: 110 MMHG | RESPIRATION RATE: 16 BRPM | OXYGEN SATURATION: 100 % | HEART RATE: 98 BPM | TEMPERATURE: 97.3 F

## 2021-03-31 DIAGNOSIS — R11.2 NAUSEA WITH VOMITING, UNSPECIFIED: ICD-10-CM

## 2021-03-31 DIAGNOSIS — Z51.89 ENCOUNTER FOR OTHER SPECIFIED AFTERCARE: ICD-10-CM

## 2021-03-31 DIAGNOSIS — Z51.11 ENCOUNTER FOR ANTINEOPLASTIC CHEMOTHERAPY: ICD-10-CM

## 2021-03-31 LAB
BASOPHILS # BLD AUTO: 0 K/UL — SIGNIFICANT CHANGE UP (ref 0–0.2)
BASOPHILS NFR BLD AUTO: 0 % — SIGNIFICANT CHANGE UP (ref 0–2)
EOSINOPHIL # BLD AUTO: 0 K/UL — SIGNIFICANT CHANGE UP (ref 0–0.5)
EOSINOPHIL NFR BLD AUTO: 0 % — SIGNIFICANT CHANGE UP (ref 0–6)
HCT VFR BLD CALC: 34.9 % — SIGNIFICANT CHANGE UP (ref 34.5–45)
HGB BLD-MCNC: 12.2 G/DL — SIGNIFICANT CHANGE UP (ref 11.5–15.5)
LYMPHOCYTES # BLD AUTO: 0.76 K/UL — LOW (ref 1–3.3)
LYMPHOCYTES # BLD AUTO: 5 % — LOW (ref 13–44)
MCHC RBC-ENTMCNC: 29.8 PG — SIGNIFICANT CHANGE UP (ref 27–34)
MCHC RBC-ENTMCNC: 35 G/DL — SIGNIFICANT CHANGE UP (ref 32–36)
MCV RBC AUTO: 85.3 FL — SIGNIFICANT CHANGE UP (ref 80–100)
MONOCYTES # BLD AUTO: 0.61 K/UL — SIGNIFICANT CHANGE UP (ref 0–0.9)
MONOCYTES NFR BLD AUTO: 4 % — SIGNIFICANT CHANGE UP (ref 2–14)
MYELOCYTES NFR BLD: 3 % — HIGH (ref 0–0)
NEUTROPHILS # BLD AUTO: 13.44 K/UL — HIGH (ref 1.8–7.4)
NEUTROPHILS NFR BLD AUTO: 88 % — HIGH (ref 43–77)
NRBC # BLD: 0 /100 — SIGNIFICANT CHANGE UP (ref 0–0)
NRBC # BLD: SIGNIFICANT CHANGE UP /100 WBCS (ref 0–0)
PLAT MORPH BLD: NORMAL — SIGNIFICANT CHANGE UP
PLATELET # BLD AUTO: 141 K/UL — LOW (ref 150–400)
RBC # BLD: 4.09 M/UL — SIGNIFICANT CHANGE UP (ref 3.8–5.2)
RBC # FLD: 12.6 % — SIGNIFICANT CHANGE UP (ref 10.3–14.5)
RBC BLD AUTO: SIGNIFICANT CHANGE UP
WBC # BLD: 15.27 K/UL — HIGH (ref 3.8–10.5)
WBC # FLD AUTO: 15.27 K/UL — HIGH (ref 3.8–10.5)

## 2021-03-31 PROCEDURE — 99214 OFFICE O/P EST MOD 30 MIN: CPT

## 2021-03-31 PROCEDURE — 99072 ADDL SUPL MATRL&STAF TM PHE: CPT

## 2021-04-01 ENCOUNTER — OUTPATIENT (OUTPATIENT)
Dept: OUTPATIENT SERVICES | Facility: HOSPITAL | Age: 46
LOS: 1 days | Discharge: HOME | End: 2021-04-01

## 2021-04-01 DIAGNOSIS — C50.919 MALIGNANT NEOPLASM OF UNSPECIFIED SITE OF UNSPECIFIED FEMALE BREAST: ICD-10-CM

## 2021-04-01 DIAGNOSIS — Z85.3 PERSONAL HISTORY OF MALIGNANT NEOPLASM OF BREAST: ICD-10-CM

## 2021-04-01 DIAGNOSIS — Z90.49 ACQUIRED ABSENCE OF OTHER SPECIFIED PARTS OF DIGESTIVE TRACT: Chronic | ICD-10-CM

## 2021-04-01 DIAGNOSIS — Z98.890 OTHER SPECIFIED POSTPROCEDURAL STATES: Chronic | ICD-10-CM

## 2021-04-02 NOTE — END OF VISIT
[Time Spent: ___ minutes] : I have spent [unfilled] minutes of time on the encounter. [FreeTextEntry3] : discussed with

## 2021-04-02 NOTE — REASON FOR VISIT
[Follow-Up Visit] : a follow-up [Pre-Treatment Visit] : a pre-treatment [FreeTextEntry2] : Breast Cancer on adjuvant chemotherapy

## 2021-04-02 NOTE — HISTORY OF PRESENT ILLNESS
[Disease: _____________________] : Disease: [unfilled] [T: ___] : T[unfilled] [N: ___] : N[unfilled] [M: ___] : M[unfilled] [AJCC Stage: ____] : AJCC Stage: [unfilled] [de-identified] : The patient has h/o chronic "cystic breasts."  \par \par Her history of present illness began with a breast self-examination in approximately the third week of 11/2020 when she noticed a palpable mass in the upper right breast just above the nipple area.  She called her gynecologist and ultimately scheduled a diagnostic mammogram on 11/25/2020 with the finding of a spiculated mass in the supra-areolar region of the right breast, mid level to the chest wall, with suspicious microcalcifications identified, extending for approximately 4 cm with a suggestion of retraction with extension to the anterior surface of the breast approximately 2 cm inferior to the mass, suspicious microcalcifications identified within the mass; there were scattered groups of calcifications also identified in the left breast with questionable areas of nodularity.  A bilateral breast ultrasound was performed on that same date with a finding of a 4 cm spiculated mass in the right breast, 12 o'clock axis, 1 cm from the nipple, with a note that although other irregular areas of nodularity were present, tissue sampling of this mass was advised under ultrasound-guided core biopsy; the left breast had a 3 mm slightly irregular hypoechoic nodule at the 4 o'clock axis, 6 cm from the nipple with no area of suspicious architectural distortion or acoustical shadowing; no abnormal adenopathy was seen in either axilla.  On 12/02/2020, the patient underwent a right breast 12 o'clock axis core biopsy with a finding of moderately differentiated invasive duct carcinoma with focal squamous differentiation, with core biopsy specimen measuring 1.2 cm, estrogen receptor positive (80%), progesterone receptor positive (30%), and HER-2/lou negative (1+ staining).  She subsequently saw Dr. Flor lAfonso who then ordered a bilateral breast MRI which was performed on 12/15/2020 with a finding of a spiculated enhancing mass in the right upper central breast, middle depth which measured at least 3 x 4 cm corresponding to the biopsy-proven malignancy; there was nodule enhancement extending inferior, medial, and superior-lateral to the mass, which most likely represented a hematoma from the recent biopsy although satellite lesions could not be excluded; no evidence of contralateral breast disease was noted.  \par \par The patient ultimately proceeded on to a bilateral mastectomy, with the right mastectomy specimen showing 2 foci of invasive duct carcinoma, poorly differentiated, spanning 35 and 14 mm, respectively (the large focus demonstrated focal squamous differentiation), Mia-SBR score 9, margins negative, with non-extensive DCIS noted, evidence of lymphovascular invasion was identified, and 1/1 sentinel lymph node with a micrometastasis (1.6 mm), and 1/1 sentinel lymph node returning negative for metastasis.\par \par The patient had germline cancer genetic testing with a BRCA1/2 analyses with [employer] Cancer [employer] RNA Insight testing which returned with no pathogenic mutation.\par \par The patient was seen by Dr. Gina Jacob in consultation who recommended adjuvant dose-dense ACT per patient's verbal report; I do not have a copy of those reports for my review.\par \par She saw me in initial consultation on 2/2/21. I had an extensive discussion with the patient regarding her recently diagnosed T2 N1mi M0, stage IIA breast cancer, noting the potential implications of her pathologic prognostic factors on the subsequent risk of developing local and metastatic recurrence.  In light of the above, I recommended adjuvant antiestrogen therapy, with tamoxifen should she be chemically premenopausal versus an aromatase inhibitor such as anastrozole if she is chemically menopausal, noting the potential risks, benefits, anticipated side effects, as well as potential reduction of these two approaches regarding the risk of developing metastatic recurrence.  She has been amenorrheic for 13 months, and I have recommended she have an FSH, LH, and estradiol level to better determine whether she is fully postmenopausal or perimenopausal at this time prior to making definitive treatment recommendations regarding antiestrogen therapy.  In addition, I have recommended that she allow us to send her tumor off for Oncotype DX testing, noting the implications of this molecular profiling tool on the subsequent decision-making as to whether to add adjuvant chemotherapy to adjuvant antiestrogen therapy.  Should this return with a low-risk score, I noted that I would recommend only antiestrogen therapy.  Should it return with a high-risk score, I would recommend adding adjuvant chemotherapy, specifically preliminarily discussing adjuvant TC chemotherapy every 3 weeks for 4 cycles with Onpro for hematologic support noting the potential risks, benefits, and anticipated side effects.  The option of using scalp cooling technologies to diminish or avoid chemotherapy-induced alopecia was also preliminarily discussed.  The patient has asked me why I did not recommend dose-dense ACT chemotherapy as Dr. Jacob had done, and I noted that it is a fine regimen, for which there is strong evidence for potential benefit in women with triple negative breast cancer, and those with lymph node positive breast cancer.  As a matter fact, from the recent TAILORx study, there is a minimal difference between TC and dose-dense ACT. \par In the interim The OncotypeDX returned with a RS 31 = 24% ROR at 9  years with GUTIERREZ or AI alone and a group average absolute chemotherapy benefit of ~ 15%. \par She wished to proceed with DD AC>>T. STarted on 3/3/21\par  [de-identified] : ER+ [place]+ her 2 lou - [de-identified] : On neoadjuvant chemotherapy regimen with dose dense doxorubicin/cyclophosphamide with onpro.\par S/p 2/4.\par \par 1. Extreme fatigue and exhaustion for for 3-4 days following treatment\par 2. Nausea with relief when administering ondansetron PRN.\par 3. Poor PO intake, patient states food tastes like "rotten garbage." Dysgeusia noted. Patient reports eating small meals throughout the day (eggs, chicken, potatoes, vegetables). \par 4. Weight loss: Patient has lost 9 pounds since 3/17/21. Patient attributes weight loss to poor PO intake due to dysgeusia. Patient educated on importance of balance meals and calorie intake, patient has met with nutritionist Bri Shah and will continue to work with the nutritionist, patient verbalizes understanding and agrees to increase calorie intake and meal portions. \par 5. Poor coping, patient is tearful, patient reports increase in anxiety and depression due to cancer diagnosis and treatment. Patient is seeing a psychotherapist and psychiatrist Dr Phillips. Patient was recently prescribed Citalopram, patient reports today is the first day on citalopram. Patient reports using xanax TID, patient reports alleviation of anxiety mid-afternoon due to the use of xanax.

## 2021-04-02 NOTE — PHYSICAL EXAM
[Restricted in physically strenuous activity but ambulatory and able to carry out work of a light or sedentary nature] : Status 1- Restricted in physically strenuous activity but ambulatory and able to carry out work of a light or sedentary nature, e.g., light house work, office work [Normal] : normal spine exam without palpable tenderness, no kyphosis or scoliosis [de-identified] : s/p b/l mastectomies with reconstruction. Wound incisions healing well. [de-identified] : Tearful at times

## 2021-04-05 ENCOUNTER — APPOINTMENT (OUTPATIENT)
Dept: HEMATOLOGY ONCOLOGY | Facility: CLINIC | Age: 46
End: 2021-04-05

## 2021-04-06 PROBLEM — Z51.5 ENCOUNTER FOR PALLIATIVE CARE: Status: ACTIVE | Noted: 2021-04-06

## 2021-04-06 NOTE — HISTORY OF PRESENT ILLNESS
[Home] : at home, [unfilled] , at the time of the visit. [Medical Office: ([facility])___] : at the medical office located in  [Verbal consent obtained from patient] : the patient, [unfilled] [FreeTextEntry1] : 45yoF with recently diagnosed Stage IIA breast cancer presents via telemedicine for initial palliative care visit, referred by Oncology team. Patient seen via Telemedicine. \par PMH also significant for GERD, IBS\par \par Recently started on Doxorubicin, Cyclophosphamide. \par \par Patient reports problems with dysgeusia that developed recently. Initially started as a mild alteration in her taste but has progressed to the point that food tastes like "rotten garbage" to her. She has tried using baking soda and salt rinses to no avail. Biotene helps a bit. Water tastes particularly badly and she has been trying hard to stay hydrated. She forces herself to eat but it takes a while to get down food, in lesser quantities than her baseline.  \par \par She has had multiple issues in her life in recent years - miscarriage, divorce, mother had cancer and now she has cancer. She recently started seeing a psychiatrist, Dr. Freddy Guerin. \par She started using Celexa just yesterday. She was prescribed this a month ago but had been hesitant to take it.  She had been using alprazolam 0.5mg PRN but finally when she started losing her hair this put her over the edge into using it. When her physical symptoms are worse, this worsens her mood further. \par She does not look forward to anything lately. She views her cancer treatment course as insurmountable.\par \par Low back pain - uses PRN Tylenol, heating pad.\par \par ROS:\par +10lb weight loss \par +recently developed little white pimples on her tongue, food/drink can burn her tongue. \par +nausea - compazine did not sit well with her. has been using zofran PRN\par +uses alprazolam QHS and this helps her to get 6-7 hours of sleep.\par +fatigue - tries to be intentional about moving/ staying physically active to combat the fatigue. \par +IBS with diarrhea - has been using probiotics for a while which has been very helpful for her.\par \par Patient is , lives alone. She has been staying with her mother sometimes because being alone has been difficult for her. She was working as a research analyst, previously was a school psychologist. Not working at present. \par \par Psychiatrist: Dr. Freddy Guerin\par \par I-Stop Ref#: [id number]

## 2021-04-06 NOTE — PHYSICAL EXAM
[General Appearance - Alert] : alert [General Appearance - In No Acute Distress] : in no acute distress [Oriented To Time, Place, And Person] : oriented to person, place, and time [FreeTextEntry1] : anxious

## 2021-04-06 NOTE — ASSESSMENT
[FreeTextEntry1] : 45yoF with:\par \par 1. Breast Cancer - On Cyclophosphamide, Doxorubicin. Follow up with Med Onc. \par \par 2. Dysgeusia - Recommend initiation of zinc 100mg daily. She can try using chewable "Miracle Berry" tablets. \par C/w biotene rinse\par Patient to obtain magic mouthwash for developing mucositis. \par \par 3. Treatment-related pain - c/w PRN Tylenol \par Medical cannabis certification completed today. Provided cannabis education, overview of state program, and discussed adverse effects in detail. Counseled that vaporized cannabis is not the preferred route of administration due to the fact that both short-term and long-term risks associated with vaporizing oils are not yet fully understood. Recommend starting with 1:1 THC:CBD formulation at low dose of THC (~2mg/dose).\par \par 4. Anxiety - Patient is following with psycho-oncology, using alprazolam QHS.  Discussed how CBD could be useful with managing her anxiety. Provided empathetic listening and validation of her worries. \par \par 5. Encounter for Palliative Care - Emotional support provided.\par \par Follow up in 1 month, call sooner with questions or issues.\par

## 2021-04-07 ENCOUNTER — APPOINTMENT (OUTPATIENT)
Dept: HEMATOLOGY ONCOLOGY | Facility: CLINIC | Age: 46
End: 2021-04-07

## 2021-04-08 ENCOUNTER — APPOINTMENT (OUTPATIENT)
Dept: HEMATOLOGY ONCOLOGY | Facility: CLINIC | Age: 46
End: 2021-04-08
Payer: COMMERCIAL

## 2021-04-08 PROCEDURE — 99214 OFFICE O/P EST MOD 30 MIN: CPT | Mod: 95

## 2021-04-08 NOTE — PHYSICAL EXAM
[Restricted in physically strenuous activity but ambulatory and able to carry out work of a light or sedentary nature] : Status 1- Restricted in physically strenuous activity but ambulatory and able to carry out work of a light or sedentary nature, e.g., light house work, office work [Thin] : thin [de-identified] : sclerae anicteric [de-identified] : no labored breathing  [de-identified] : flat affect

## 2021-04-08 NOTE — HISTORY OF PRESENT ILLNESS
[Home] : at home, [unfilled] , at the time of the visit. [Medical Office: ([facility])___] : at the medical office located in  [Verbal consent obtained from patient] : the patient, [unfilled] [Disease: _____________________] : Disease: [unfilled] [T: ___] : T[unfilled] [N: ___] : N[unfilled] [M: ___] : M[unfilled] [AJCC Stage: ____] : AJCC Stage: [unfilled] [de-identified] : The patient has h/o chronic "cystic breasts."  \par \par Her history of present illness began with a breast self-examination in approximately the third week of 11/2020 when she noticed a palpable mass in the upper right breast just above the nipple area.  She called her gynecologist and ultimately scheduled a diagnostic mammogram on 11/25/2020 with the finding of a spiculated mass in the supra-areolar region of the right breast, mid level to the chest wall, with suspicious microcalcifications identified, extending for approximately 4 cm with a suggestion of retraction with extension to the anterior surface of the breast approximately 2 cm inferior to the mass, suspicious microcalcifications identified within the mass; there were scattered groups of calcifications also identified in the left breast with questionable areas of nodularity.  A bilateral breast ultrasound was performed on that same date with a finding of a 4 cm spiculated mass in the right breast, 12 o'clock axis, 1 cm from the nipple, with a note that although other irregular areas of nodularity were present, tissue sampling of this mass was advised under ultrasound-guided core biopsy; the left breast had a 3 mm slightly irregular hypoechoic nodule at the 4 o'clock axis, 6 cm from the nipple with no area of suspicious architectural distortion or acoustical shadowing; no abnormal adenopathy was seen in either axilla.  On 12/02/2020, the patient underwent a right breast 12 o'clock axis core biopsy with a finding of moderately differentiated invasive duct carcinoma with focal squamous differentiation, with core biopsy specimen measuring 1.2 cm, estrogen receptor positive (80%), progesterone receptor positive (30%), and HER-2/lou negative (1+ staining).  She subsequently saw Dr. Flor Alfonso who then ordered a bilateral breast MRI which was performed on 12/15/2020 with a finding of a spiculated enhancing mass in the right upper central breast, middle depth which measured at least 3 x 4 cm corresponding to the biopsy-proven malignancy; there was nodule enhancement extending inferior, medial, and superior-lateral to the mass, which most likely represented a hematoma from the recent biopsy although satellite lesions could not be excluded; no evidence of contralateral breast disease was noted.  \par \par The patient ultimately proceeded on to a bilateral mastectomy, with the right mastectomy specimen showing 2 foci of invasive duct carcinoma, poorly differentiated, spanning 35 and 14 mm, respectively (the large focus demonstrated focal squamous differentiation), Mia-SBR score 9, margins negative, with non-extensive DCIS noted, evidence of lymphovascular invasion was identified, and 1/1 sentinel lymph node with a micrometastasis (1.6 mm), and 1/1 sentinel lymph node returning negative for metastasis.\par \par The patient had germline cancer genetic testing with a BRCA1/2 analyses with [employer] Next Cancer Plus RNA Insight testing which returned with no pathogenic mutation.\par \par The patient was seen by Dr. Gina Jacob in consultation who recommended adjuvant dose-dense ACT per patient's verbal report; I do not have a copy of those reports for my review.\par \par She saw me in initial consultation on 2/2/21. I had an extensive discussion with the patient regarding her recently diagnosed T2 N1mi M0, stage IIA breast cancer, noting the potential implications of her pathologic prognostic factors on the subsequent risk of developing local and metastatic recurrence.  In light of the above, I recommended adjuvant antiestrogen therapy, with tamoxifen should she be chemically premenopausal versus an aromatase inhibitor such as anastrozole if she is chemically menopausal, noting the potential risks, benefits, anticipated side effects, as well as potential reduction of these two approaches regarding the risk of developing metastatic recurrence.  She has been amenorrheic for 13 months, and I have recommended she have an FSH, LH, and estradiol level to better determine whether she is fully postmenopausal or perimenopausal at this time prior to making definitive treatment recommendations regarding antiestrogen therapy.  In addition, I have recommended that she allow us to send her tumor off for Oncotype DX testing, noting the implications of this molecular profiling tool on the subsequent decision-making as to whether to add adjuvant chemotherapy to adjuvant antiestrogen therapy.  Should this return with a low-risk score, I noted that I would recommend only antiestrogen therapy.  Should it return with a high-risk score, I would recommend adding adjuvant chemotherapy, specifically preliminarily discussing adjuvant TC chemotherapy every 3 weeks for 4 cycles with Onpro for hematologic support noting the potential risks, benefits, and anticipated side effects.  The option of using scalp cooling technologies to diminish or avoid chemotherapy-induced alopecia was also preliminarily discussed.  The patient has asked me why I did not recommend dose-dense ACT chemotherapy as Dr. Jacob had done, and I noted that it is a fine regimen, for which there is strong evidence for potential benefit in women with triple negative breast cancer, and those with lymph node positive breast cancer.  As a matter fact, from the recent TAILORx study, there is a minimal difference between TC and dose-dense ACT. \par In the interim The OncotypeDX returned with a RS 31 = 24% ROR at 9  years with GUTIERREZ or AI alone and a group average absolute chemotherapy benefit of ~ 15%. \par She wished to proceed with DD AC>>T. STarted on 3/3/21\par  [de-identified] : ER+ GA+ her 2 lou - [de-identified] : On neoadjuvant chemotherapy regimen with dose dense doxorubicin/cyclophosphamide with Onpro.\par \par S/p 3/4 on 3/31/21.\par \par On 3/31/21 she c/o : \par 1. Extreme fatigue and exhaustion for for 3-4 days following treatment\par 2. Nausea with relief when administering ondansetron PRN.\par 3. Poor PO intake, patient states food tastes like "rotten garbage." Dysgeusia noted. Patient reports eating small meals throughout the day (eggs, chicken, potatoes, vegetables). \par 4. Weight loss: Patient has lost 9 pounds since 3/17/21. Patient attributes weight loss to poor PO intake due to dysgeusia. Patient educated on importance of balance meals and calorie intake, patient has met with nutritionist Bri Shah and will continue to work with the nutritionist, patient verbalizes understanding and agrees to increase calorie intake and meal portions. \par 5. Poor coping, patient is tearful, patient reports increase in anxiety and depression due to cancer diagnosis and treatment. Patient is seeing a psychotherapist and psychiatrist Dr Phillips. Patient was recently prescribed Citalopram, patient reports today is the first day on citalopram. Patient reports using xanax TID, patient reports alleviation of anxiety mid-afternoon due to the use of xanax.\par \par Seen today in follow up out of concern for her treatment related side effects and especially her wt loss.\par She notes continued dysgeusia "everything tastes like rotten food" which make it difficult for her to eat although she persists. She notes that she usually snacks a lot, eats sweets a lot between meals and with her dysgeusia cannot tolerate any of these snacks. She had low grade nausea days 3-4 for which she took ondansetron with good effect. She notes profound fatigue barbara days 3-6 with associated generalized malaise. She had 2 loose BMS.  She indicates that she likely lost another 1-2 lbs in the interim. her appetite is now picking yo again and she is eating more again.   \par

## 2021-04-14 ENCOUNTER — RESULT REVIEW (OUTPATIENT)
Age: 46
End: 2021-04-14

## 2021-04-14 ENCOUNTER — LABORATORY RESULT (OUTPATIENT)
Age: 46
End: 2021-04-14

## 2021-04-14 ENCOUNTER — APPOINTMENT (OUTPATIENT)
Dept: HEMATOLOGY ONCOLOGY | Facility: CLINIC | Age: 46
End: 2021-04-14
Payer: COMMERCIAL

## 2021-04-14 ENCOUNTER — APPOINTMENT (OUTPATIENT)
Dept: INFUSION THERAPY | Facility: HOSPITAL | Age: 46
End: 2021-04-14

## 2021-04-14 VITALS
HEART RATE: 77 BPM | TEMPERATURE: 97 F | HEIGHT: 65.75 IN | SYSTOLIC BLOOD PRESSURE: 98 MMHG | DIASTOLIC BLOOD PRESSURE: 68 MMHG | RESPIRATION RATE: 14 BRPM | BODY MASS INDEX: 17.93 KG/M2 | WEIGHT: 110.23 LBS

## 2021-04-14 LAB
BASOPHILS # BLD AUTO: 0.11 K/UL — SIGNIFICANT CHANGE UP (ref 0–0.2)
BASOPHILS NFR BLD AUTO: 1 % — SIGNIFICANT CHANGE UP (ref 0–2)
EOSINOPHIL # BLD AUTO: 0 K/UL — SIGNIFICANT CHANGE UP (ref 0–0.5)
EOSINOPHIL NFR BLD AUTO: 0 % — SIGNIFICANT CHANGE UP (ref 0–6)
HCT VFR BLD CALC: 32.5 % — LOW (ref 34.5–45)
HGB BLD-MCNC: 11.1 G/DL — LOW (ref 11.5–15.5)
LYMPHOCYTES # BLD AUTO: 0.63 K/UL — LOW (ref 1–3.3)
LYMPHOCYTES # BLD AUTO: 6 % — LOW (ref 13–44)
MCHC RBC-ENTMCNC: 29.8 PG — SIGNIFICANT CHANGE UP (ref 27–34)
MCHC RBC-ENTMCNC: 34.2 G/DL — SIGNIFICANT CHANGE UP (ref 32–36)
MCV RBC AUTO: 87.1 FL — SIGNIFICANT CHANGE UP (ref 80–100)
METAMYELOCYTES # FLD: 2 % — HIGH (ref 0–0)
MONOCYTES # BLD AUTO: 0.32 K/UL — SIGNIFICANT CHANGE UP (ref 0–0.9)
MONOCYTES NFR BLD AUTO: 3 % — SIGNIFICANT CHANGE UP (ref 2–14)
MYELOCYTES NFR BLD: 2 % — HIGH (ref 0–0)
NEUTROPHILS # BLD AUTO: 9.1 K/UL — HIGH (ref 1.8–7.4)
NEUTROPHILS NFR BLD AUTO: 84 % — HIGH (ref 43–77)
NEUTS BAND # BLD: 2 % — SIGNIFICANT CHANGE UP (ref 0–8)
NRBC # BLD: 0 /100 — SIGNIFICANT CHANGE UP (ref 0–0)
NRBC # BLD: SIGNIFICANT CHANGE UP /100 WBCS (ref 0–0)
PLAT MORPH BLD: NORMAL — SIGNIFICANT CHANGE UP
PLATELET # BLD AUTO: 162 K/UL — SIGNIFICANT CHANGE UP (ref 150–400)
RBC # BLD: 3.73 M/UL — LOW (ref 3.8–5.2)
RBC # FLD: 13.8 % — SIGNIFICANT CHANGE UP (ref 10.3–14.5)
RBC BLD AUTO: SIGNIFICANT CHANGE UP
WBC # BLD: 10.58 K/UL — HIGH (ref 3.8–10.5)
WBC # FLD AUTO: 10.58 K/UL — HIGH (ref 3.8–10.5)

## 2021-04-14 PROCEDURE — 99072 ADDL SUPL MATRL&STAF TM PHE: CPT

## 2021-04-14 PROCEDURE — 99214 OFFICE O/P EST MOD 30 MIN: CPT

## 2021-04-15 NOTE — HISTORY OF PRESENT ILLNESS
[Disease: _____________________] : Disease: [unfilled] [T: ___] : T[unfilled] [N: ___] : N[unfilled] [M: ___] : M[unfilled] [AJCC Stage: ____] : AJCC Stage: [unfilled] [Home] : at home, [unfilled] , at the time of the visit. [Medical Office: ([facility])___] : at the medical office located in  [Verbal consent obtained from patient] : the patient, [unfilled] [de-identified] : The patient has h/o chronic "cystic breasts."  \par \par Her history of present illness began with a breast self-examination in approximately the third week of 11/2020 when she noticed a palpable mass in the upper right breast just above the nipple area.  She called her gynecologist and ultimately scheduled a diagnostic mammogram on 11/25/2020 with the finding of a spiculated mass in the supra-areolar region of the right breast, mid level to the chest wall, with suspicious microcalcifications identified, extending for approximately 4 cm with a suggestion of retraction with extension to the anterior surface of the breast approximately 2 cm inferior to the mass, suspicious microcalcifications identified within the mass; there were scattered groups of calcifications also identified in the left breast with questionable areas of nodularity.  A bilateral breast ultrasound was performed on that same date with a finding of a 4 cm spiculated mass in the right breast, 12 o'clock axis, 1 cm from the nipple, with a note that although other irregular areas of nodularity were present, tissue sampling of this mass was advised under ultrasound-guided core biopsy; the left breast had a 3 mm slightly irregular hypoechoic nodule at the 4 o'clock axis, 6 cm from the nipple with no area of suspicious architectural distortion or acoustical shadowing; no abnormal adenopathy was seen in either axilla.  On 12/02/2020, the patient underwent a right breast 12 o'clock axis core biopsy with a finding of moderately differentiated invasive duct carcinoma with focal squamous differentiation, with core biopsy specimen measuring 1.2 cm, estrogen receptor positive (80%), progesterone receptor positive (30%), and HER-2/lou negative (1+ staining).  She subsequently saw Dr. Flor Alfonso who then ordered a bilateral breast MRI which was performed on 12/15/2020 with a finding of a spiculated enhancing mass in the right upper central breast, middle depth which measured at least 3 x 4 cm corresponding to the biopsy-proven malignancy; there was nodule enhancement extending inferior, medial, and superior-lateral to the mass, which most likely represented a hematoma from the recent biopsy although satellite lesions could not be excluded; no evidence of contralateral breast disease was noted.  \par \par The patient ultimately proceeded on to a bilateral mastectomy, with the right mastectomy specimen showing 2 foci of invasive duct carcinoma, poorly differentiated, spanning 35 and 14 mm, respectively (the large focus demonstrated focal squamous differentiation), Mia-SBR score 9, margins negative, with non-extensive DCIS noted, evidence of lymphovascular invasion was identified, and 1/1 sentinel lymph node with a micrometastasis (1.6 mm), and 1/1 sentinel lymph node returning negative for metastasis.\par \par The patient had germline cancer genetic testing with a BRCA1/2 analyses with Custom Next Cancer Plus RNA Insight testing which returned with no pathogenic mutation.\par \par The patient was seen by Dr. Gina Jacob in consultation who recommended adjuvant dose-dense ACT per patient's verbal report; I do not have a copy of those reports for my review.\par \par She saw me in initial consultation on 2/2/21. I had an extensive discussion with the patient regarding her recently diagnosed T2 N1mi M0, stage IIA breast cancer, noting the potential implications of her pathologic prognostic factors on the subsequent risk of developing local and metastatic recurrence.  In light of the above, I recommended adjuvant antiestrogen therapy, with tamoxifen should she be chemically premenopausal versus an aromatase inhibitor such as anastrozole if she is chemically menopausal, noting the potential risks, benefits, anticipated side effects, as well as potential reduction of these two approaches regarding the risk of developing metastatic recurrence.  She has been amenorrheic for 13 months, and I have recommended she have an FSH, LH, and estradiol level to better determine whether she is fully postmenopausal or perimenopausal at this time prior to making definitive treatment recommendations regarding antiestrogen therapy.  In addition, I have recommended that she allow us to send her tumor off for Oncotype DX testing, noting the implications of this molecular profiling tool on the subsequent decision-making as to whether to add adjuvant chemotherapy to adjuvant antiestrogen therapy.  Should this return with a low-risk score, I noted that I would recommend only antiestrogen therapy.  Should it return with a high-risk score, I would recommend adding adjuvant chemotherapy, specifically preliminarily discussing adjuvant TC chemotherapy every 3 weeks for 4 cycles with Onpro for hematologic support noting the potential risks, benefits, and anticipated side effects.  The option of using scalp cooling technologies to diminish or avoid chemotherapy-induced alopecia was also preliminarily discussed.  The patient has asked me why I did not recommend dose-dense ACT chemotherapy as Dr. Jacob had done, and I noted that it is a fine regimen, for which there is strong evidence for potential benefit in women with triple negative breast cancer, and those with lymph node positive breast cancer.  As a matter fact, from the recent TAILORx study, there is a minimal difference between TC and dose-dense ACT. \par In the interim The OncotypeDX returned with a RS 31 = 24% ROR at 9  years with GUTIERREZ or AI alone and a group average absolute chemotherapy benefit of ~ 15%. \par She wished to proceed with DD AC>>T. STarted on 3/3/21\par  [de-identified] : ER+ [place]+ her 2 lou - [de-identified] : On adjuvant chemotherapy regimen with dose dense doxorubicin/cyclophosphamide with Onpro.\par S/p 3/4 on 3/31/21. Dose reduced at the time of cycle #3 secondary to approx 9 lb weight loss.\par She reports feeling a little better in the last week\par Cont with extreme fatigue on days 3-5\par dysgeusia and nausea are both somewhat better\par Eating small meals, and has attempted to increase her PO intake.\par Taking xanax 1 tab bid, and ativan 1 tab at night, with improved management of her anxiety symptoms.\par

## 2021-04-15 NOTE — PHYSICAL EXAM
[Restricted in physically strenuous activity but ambulatory and able to carry out work of a light or sedentary nature] : Status 1- Restricted in physically strenuous activity but ambulatory and able to carry out work of a light or sedentary nature, e.g., light house work, office work [Thin] : thin [Normal] : affect appropriate [de-identified] : s/p b/l mastectomies with reconstruction. Wound incisions healing well.

## 2021-04-23 ENCOUNTER — OUTPATIENT (OUTPATIENT)
Dept: OUTPATIENT SERVICES | Facility: HOSPITAL | Age: 46
LOS: 1 days | Discharge: HOME | End: 2021-04-23

## 2021-04-23 DIAGNOSIS — Z98.890 OTHER SPECIFIED POSTPROCEDURAL STATES: Chronic | ICD-10-CM

## 2021-04-23 DIAGNOSIS — Z85.3 PERSONAL HISTORY OF MALIGNANT NEOPLASM OF BREAST: ICD-10-CM

## 2021-04-23 DIAGNOSIS — Z90.49 ACQUIRED ABSENCE OF OTHER SPECIFIED PARTS OF DIGESTIVE TRACT: Chronic | ICD-10-CM

## 2021-04-28 ENCOUNTER — LABORATORY RESULT (OUTPATIENT)
Age: 46
End: 2021-04-28

## 2021-04-28 ENCOUNTER — NON-APPOINTMENT (OUTPATIENT)
Age: 46
End: 2021-04-28

## 2021-04-28 ENCOUNTER — RESULT REVIEW (OUTPATIENT)
Age: 46
End: 2021-04-28

## 2021-04-28 ENCOUNTER — APPOINTMENT (OUTPATIENT)
Dept: HEMATOLOGY ONCOLOGY | Facility: CLINIC | Age: 46
End: 2021-04-28
Payer: COMMERCIAL

## 2021-04-28 ENCOUNTER — APPOINTMENT (OUTPATIENT)
Dept: INFUSION THERAPY | Facility: HOSPITAL | Age: 46
End: 2021-04-28

## 2021-04-28 VITALS
TEMPERATURE: 97.3 F | HEIGHT: 65.75 IN | RESPIRATION RATE: 17 BRPM | OXYGEN SATURATION: 100 % | DIASTOLIC BLOOD PRESSURE: 67 MMHG | BODY MASS INDEX: 17.57 KG/M2 | HEART RATE: 99 BPM | WEIGHT: 108 LBS | SYSTOLIC BLOOD PRESSURE: 93 MMHG

## 2021-04-28 DIAGNOSIS — C50.919 MALIGNANT NEOPLASM OF UNSPECIFIED SITE OF UNSPECIFIED FEMALE BREAST: ICD-10-CM

## 2021-04-28 DIAGNOSIS — R11.2 NAUSEA WITH VOMITING, UNSPECIFIED: ICD-10-CM

## 2021-04-28 DIAGNOSIS — Z51.11 ENCOUNTER FOR ANTINEOPLASTIC CHEMOTHERAPY: ICD-10-CM

## 2021-04-28 LAB
BASOPHILS # BLD AUTO: 0.17 K/UL — SIGNIFICANT CHANGE UP (ref 0–0.2)
BASOPHILS NFR BLD AUTO: 1 % — SIGNIFICANT CHANGE UP (ref 0–2)
EOSINOPHIL # BLD AUTO: 0 K/UL — SIGNIFICANT CHANGE UP (ref 0–0.5)
EOSINOPHIL NFR BLD AUTO: 0 % — SIGNIFICANT CHANGE UP (ref 0–6)
HCT VFR BLD CALC: 29.6 % — LOW (ref 34.5–45)
HGB BLD-MCNC: 10.1 G/DL — LOW (ref 11.5–15.5)
LYMPHOCYTES # BLD AUTO: 1.54 K/UL — SIGNIFICANT CHANGE UP (ref 1–3.3)
LYMPHOCYTES # BLD AUTO: 9 % — LOW (ref 13–44)
MCHC RBC-ENTMCNC: 29.9 PG — SIGNIFICANT CHANGE UP (ref 27–34)
MCHC RBC-ENTMCNC: 34.1 G/DL — SIGNIFICANT CHANGE UP (ref 32–36)
MCV RBC AUTO: 87.6 FL — SIGNIFICANT CHANGE UP (ref 80–100)
METAMYELOCYTES # FLD: 1 % — HIGH (ref 0–0)
MONOCYTES # BLD AUTO: 0.17 K/UL — SIGNIFICANT CHANGE UP (ref 0–0.9)
MONOCYTES NFR BLD AUTO: 1 % — LOW (ref 2–14)
MYELOCYTES NFR BLD: 1 % — HIGH (ref 0–0)
NEUTROPHILS # BLD AUTO: 14.89 K/UL — HIGH (ref 1.8–7.4)
NEUTROPHILS NFR BLD AUTO: 85 % — HIGH (ref 43–77)
NEUTS BAND # BLD: 2 % — SIGNIFICANT CHANGE UP (ref 0–8)
NRBC # BLD: 0 /100 — SIGNIFICANT CHANGE UP (ref 0–0)
NRBC # BLD: SIGNIFICANT CHANGE UP /100 WBCS (ref 0–0)
PLAT MORPH BLD: NORMAL — SIGNIFICANT CHANGE UP
PLATELET # BLD AUTO: 152 K/UL — SIGNIFICANT CHANGE UP (ref 150–400)
RBC # BLD: 3.38 M/UL — LOW (ref 3.8–5.2)
RBC # FLD: 15.7 % — HIGH (ref 10.3–14.5)
RBC BLD AUTO: SIGNIFICANT CHANGE UP
WBC # BLD: 17.12 K/UL — HIGH (ref 3.8–10.5)
WBC # FLD AUTO: 17.12 K/UL — HIGH (ref 3.8–10.5)

## 2021-04-28 PROCEDURE — 99214 OFFICE O/P EST MOD 30 MIN: CPT

## 2021-04-28 PROCEDURE — 99072 ADDL SUPL MATRL&STAF TM PHE: CPT

## 2021-04-29 ENCOUNTER — NON-APPOINTMENT (OUTPATIENT)
Age: 46
End: 2021-04-29

## 2021-04-29 NOTE — PHYSICAL EXAM
[Restricted in physically strenuous activity but ambulatory and able to carry out work of a light or sedentary nature] : Status 1- Restricted in physically strenuous activity but ambulatory and able to carry out work of a light or sedentary nature, e.g., light house work, office work [Thin] : thin [Normal] : affect appropriate [de-identified] : s/p b/l mastectomies with reconstruction. Wound incisions healing well.

## 2021-04-29 NOTE — REVIEW OF SYSTEMS
[Negative] : Endocrine [FreeTextEntry2] : as above [FreeTextEntry7] : as above [de-identified] : as above

## 2021-04-29 NOTE — HISTORY OF PRESENT ILLNESS
[Disease: _____________________] : Disease: [unfilled] [T: ___] : T[unfilled] [N: ___] : N[unfilled] [M: ___] : M[unfilled] [AJCC Stage: ____] : AJCC Stage: [unfilled] [de-identified] : The patient has h/o chronic "cystic breasts."  \par \par Her history of present illness began with a breast self-examination in approximately the third week of 11/2020 when she noticed a palpable mass in the upper right breast just above the nipple area.  She called her gynecologist and ultimately scheduled a diagnostic mammogram on 11/25/2020 with the finding of a spiculated mass in the supra-areolar region of the right breast, mid level to the chest wall, with suspicious microcalcifications identified, extending for approximately 4 cm with a suggestion of retraction with extension to the anterior surface of the breast approximately 2 cm inferior to the mass, suspicious microcalcifications identified within the mass; there were scattered groups of calcifications also identified in the left breast with questionable areas of nodularity.  A bilateral breast ultrasound was performed on that same date with a finding of a 4 cm spiculated mass in the right breast, 12 o'clock axis, 1 cm from the nipple, with a note that although other irregular areas of nodularity were present, tissue sampling of this mass was advised under ultrasound-guided core biopsy; the left breast had a 3 mm slightly irregular hypoechoic nodule at the 4 o'clock axis, 6 cm from the nipple with no area of suspicious architectural distortion or acoustical shadowing; no abnormal adenopathy was seen in either axilla.  On 12/02/2020, the patient underwent a right breast 12 o'clock axis core biopsy with a finding of moderately differentiated invasive duct carcinoma with focal squamous differentiation, with core biopsy specimen measuring 1.2 cm, estrogen receptor positive (80%), progesterone receptor positive (30%), and HER-2/lou negative (1+ staining).  She subsequently saw Dr. Flor Alfonso who then ordered a bilateral breast MRI which was performed on 12/15/2020 with a finding of a spiculated enhancing mass in the right upper central breast, middle depth which measured at least 3 x 4 cm corresponding to the biopsy-proven malignancy; there was nodule enhancement extending inferior, medial, and superior-lateral to the mass, which most likely represented a hematoma from the recent biopsy although satellite lesions could not be excluded; no evidence of contralateral breast disease was noted.  \par \par The patient ultimately proceeded on to a bilateral mastectomy, with the right mastectomy specimen showing 2 foci of invasive duct carcinoma, poorly differentiated, spanning 35 and 14 mm, respectively (the large focus demonstrated focal squamous differentiation), Mia-SBR score 9, margins negative, with non-extensive DCIS noted, evidence of lymphovascular invasion was identified, and 1/1 sentinel lymph node with a micrometastasis (1.6 mm), and 1/1 sentinel lymph node returning negative for metastasis.\par \par The patient had germline cancer genetic testing with a BRCA1/2 analyses with [employer] RNA Insight testing which returned with no pathogenic mutation.\par \par The patient was seen by Dr. Gina Jacob in consultation who recommended adjuvant dose-dense ACT per patient's verbal report; I do not have a copy of those reports for my review.\par \par She saw me in initial consultation on 2/2/21. I had an extensive discussion with the patient regarding her recently diagnosed T2 N1mi M0, stage IIA breast cancer, noting the potential implications of her pathologic prognostic factors on the subsequent risk of developing local and metastatic recurrence.  In light of the above, I recommended adjuvant antiestrogen therapy, with tamoxifen should she be chemically premenopausal versus an aromatase inhibitor such as anastrozole if she is chemically menopausal, noting the potential risks, benefits, anticipated side effects, as well as potential reduction of these two approaches regarding the risk of developing metastatic recurrence.  She has been amenorrheic for 13 months, and I have recommended she have an FSH, LH, and estradiol level to better determine whether she is fully postmenopausal or perimenopausal at this time prior to making definitive treatment recommendations regarding antiestrogen therapy.  In addition, I have recommended that she allow us to send her tumor off for Oncotype DX testing, noting the implications of this molecular profiling tool on the subsequent decision-making as to whether to add adjuvant chemotherapy to adjuvant antiestrogen therapy.  Should this return with a low-risk score, I noted that I would recommend only antiestrogen therapy.  Should it return with a high-risk score, I would recommend adding adjuvant chemotherapy, specifically preliminarily discussing adjuvant TC chemotherapy every 3 weeks for 4 cycles with Onpro for hematologic support noting the potential risks, benefits, and anticipated side effects.  The option of using scalp cooling technologies to diminish or avoid chemotherapy-induced alopecia was also preliminarily discussed.  The patient has asked me why I did not recommend dose-dense ACT chemotherapy as Dr. Jacob had done, and I noted that it is a fine regimen, for which there is strong evidence for potential benefit in women with triple negative breast cancer, and those with lymph node positive breast cancer.  As a matter fact, from the recent TAILORx study, there is a minimal difference between TC and dose-dense ACT. \par In the interim The OncotypeDX returned with a RS 31 = 24% ROR at 9  years with GUTIERREZ or AI alone and a group average absolute chemotherapy benefit of ~ 15%. \par She wished to proceed with DD AC>>T. STarted on 3/3/21\par  [de-identified] : ER+ ND+ her 2 lou - [de-identified] : On adjuvant chemotherapy regimen with dose dense doxorubicin/cyclophosphamide with Onpro.\par S/p 3/4 on 3/31/21. Dose reduced at the time of cycle #3 secondary to approx 9 lb weight loss.\par s/9 C#4\par Onoging dysgeusia Once she gets past the mid cycle fatigue and nausea, po intake has gradually improved.\par Eating small meals. Snacking less.\par HAd mucositis earlier on in the cycle, now almost resolved.\par Anxiety better as well. .\par Taking Celexa 1 tab daily, along with xanax 1 tab in AM, and ativan 1 tab at night, with improved management of her anxiety symptoms.\par

## 2021-05-12 ENCOUNTER — RESULT REVIEW (OUTPATIENT)
Age: 46
End: 2021-05-12

## 2021-05-12 ENCOUNTER — APPOINTMENT (OUTPATIENT)
Dept: INFUSION THERAPY | Facility: HOSPITAL | Age: 46
End: 2021-05-12

## 2021-05-12 ENCOUNTER — LABORATORY RESULT (OUTPATIENT)
Age: 46
End: 2021-05-12

## 2021-05-12 ENCOUNTER — APPOINTMENT (OUTPATIENT)
Dept: HEMATOLOGY ONCOLOGY | Facility: CLINIC | Age: 46
End: 2021-05-12
Payer: COMMERCIAL

## 2021-05-12 VITALS
TEMPERATURE: 96.6 F | WEIGHT: 106.9 LBS | HEIGHT: 66.54 IN | HEART RATE: 81 BPM | SYSTOLIC BLOOD PRESSURE: 109 MMHG | BODY MASS INDEX: 16.98 KG/M2 | RESPIRATION RATE: 16 BRPM | OXYGEN SATURATION: 96 % | DIASTOLIC BLOOD PRESSURE: 68 MMHG

## 2021-05-12 DIAGNOSIS — Z51.89 ENCOUNTER FOR OTHER SPECIFIED AFTERCARE: ICD-10-CM

## 2021-05-12 LAB
BASOPHILS # BLD AUTO: 0 K/UL — SIGNIFICANT CHANGE UP (ref 0–0.2)
BASOPHILS NFR BLD AUTO: 0 % — SIGNIFICANT CHANGE UP (ref 0–2)
EOSINOPHIL # BLD AUTO: 0 K/UL — SIGNIFICANT CHANGE UP (ref 0–0.5)
EOSINOPHIL NFR BLD AUTO: 0 % — SIGNIFICANT CHANGE UP (ref 0–6)
HCT VFR BLD CALC: 29 % — LOW (ref 34.5–45)
HGB BLD-MCNC: 9.7 G/DL — LOW (ref 11.5–15.5)
LYMPHOCYTES # BLD AUTO: 1.16 K/UL — SIGNIFICANT CHANGE UP (ref 1–3.3)
LYMPHOCYTES # BLD AUTO: 4 % — LOW (ref 13–44)
MCHC RBC-ENTMCNC: 30.7 PG — SIGNIFICANT CHANGE UP (ref 27–34)
MCHC RBC-ENTMCNC: 33.4 G/DL — SIGNIFICANT CHANGE UP (ref 32–36)
MCV RBC AUTO: 91.8 FL — SIGNIFICANT CHANGE UP (ref 80–100)
METAMYELOCYTES # FLD: 2 % — HIGH (ref 0–0)
MONOCYTES # BLD AUTO: 0.29 K/UL — SIGNIFICANT CHANGE UP (ref 0–0.9)
MONOCYTES NFR BLD AUTO: 1 % — LOW (ref 2–14)
MYELOCYTES NFR BLD: 4 % — HIGH (ref 0–0)
NEUTROPHILS # BLD AUTO: 25.88 K/UL — HIGH (ref 1.8–7.4)
NEUTROPHILS NFR BLD AUTO: 87 % — HIGH (ref 43–77)
NEUTS BAND # BLD: 2 % — SIGNIFICANT CHANGE UP (ref 0–8)
NRBC # BLD: 0 /100 — SIGNIFICANT CHANGE UP (ref 0–0)
NRBC # BLD: SIGNIFICANT CHANGE UP /100 WBCS (ref 0–0)
PLAT MORPH BLD: NORMAL — SIGNIFICANT CHANGE UP
PLATELET # BLD AUTO: 146 K/UL — LOW (ref 150–400)
RBC # BLD: 3.16 M/UL — LOW (ref 3.8–5.2)
RBC # FLD: 19.5 % — HIGH (ref 10.3–14.5)
RBC BLD AUTO: SIGNIFICANT CHANGE UP
WBC # BLD: 29.08 K/UL — HIGH (ref 3.8–10.5)
WBC # FLD AUTO: 29.08 K/UL — HIGH (ref 3.8–10.5)

## 2021-05-12 PROCEDURE — 99072 ADDL SUPL MATRL&STAF TM PHE: CPT

## 2021-05-12 PROCEDURE — 99214 OFFICE O/P EST MOD 30 MIN: CPT

## 2021-05-12 NOTE — HISTORY OF PRESENT ILLNESS
[Disease: _____________________] : Disease: [unfilled] [T: ___] : T[unfilled] [N: ___] : N[unfilled] [M: ___] : M[unfilled] [AJCC Stage: ____] : AJCC Stage: [unfilled] [de-identified] : The patient has h/o chronic "cystic breasts."  \par \par Her history of present illness began with a breast self-examination in approximately the third week of 11/2020 when she noticed a palpable mass in the upper right breast just above the nipple area.  She called her gynecologist and ultimately scheduled a diagnostic mammogram on 11/25/2020 with the finding of a spiculated mass in the supra-areolar region of the right breast, mid level to the chest wall, with suspicious microcalcifications identified, extending for approximately 4 cm with a suggestion of retraction with extension to the anterior surface of the breast approximately 2 cm inferior to the mass, suspicious microcalcifications identified within the mass; there were scattered groups of calcifications also identified in the left breast with questionable areas of nodularity.  A bilateral breast ultrasound was performed on that same date with a finding of a 4 cm spiculated mass in the right breast, 12 o'clock axis, 1 cm from the nipple, with a note that although other irregular areas of nodularity were present, tissue sampling of this mass was advised under ultrasound-guided core biopsy; the left breast had a 3 mm slightly irregular hypoechoic nodule at the 4 o'clock axis, 6 cm from the nipple with no area of suspicious architectural distortion or acoustical shadowing; no abnormal adenopathy was seen in either axilla.  On 12/02/2020, the patient underwent a right breast 12 o'clock axis core biopsy with a finding of moderately differentiated invasive duct carcinoma with focal squamous differentiation, with core biopsy specimen measuring 1.2 cm, estrogen receptor positive (80%), progesterone receptor positive (30%), and HER-2/lou negative (1+ staining).  She subsequently saw Dr. Flor Alfonso who then ordered a bilateral breast MRI which was performed on 12/15/2020 with a finding of a spiculated enhancing mass in the right upper central breast, middle depth which measured at least 3 x 4 cm corresponding to the biopsy-proven malignancy; there was nodule enhancement extending inferior, medial, and superior-lateral to the mass, which most likely represented a hematoma from the recent biopsy although satellite lesions could not be excluded; no evidence of contralateral breast disease was noted.  \par \par The patient ultimately proceeded on to a bilateral mastectomy, with the right mastectomy specimen showing 2 foci of invasive duct carcinoma, poorly differentiated, spanning 35 and 14 mm, respectively (the large focus demonstrated focal squamous differentiation), Mia-SBR score 9, margins negative, with non-extensive DCIS noted, evidence of lymphovascular invasion was identified, and 1/1 sentinel lymph node with a micrometastasis (1.6 mm), and 1/1 sentinel lymph node returning negative for metastasis.\par \par The patient had germline cancer genetic testing with a BRCA1/2 analyses with [employer] RNA Insight testing which returned with no pathogenic mutation.\par \par The patient was seen by Dr. Gina Jacob in consultation who recommended adjuvant dose-dense ACT per patient's verbal report; I do not have a copy of those reports for my review.\par \par She saw me in initial consultation on 2/2/21. I had an extensive discussion with the patient regarding her recently diagnosed T2 N1mi M0, stage IIA breast cancer, noting the potential implications of her pathologic prognostic factors on the subsequent risk of developing local and metastatic recurrence.  In light of the above, I recommended adjuvant antiestrogen therapy, with tamoxifen should she be chemically premenopausal versus an aromatase inhibitor such as anastrozole if she is chemically menopausal, noting the potential risks, benefits, anticipated side effects, as well as potential reduction of these two approaches regarding the risk of developing metastatic recurrence.  She has been amenorrheic for 13 months, and I have recommended she have an FSH, LH, and estradiol level to better determine whether she is fully postmenopausal or perimenopausal at this time prior to making definitive treatment recommendations regarding antiestrogen therapy.  In addition, I have recommended that she allow us to send her tumor off for Oncotype DX testing, noting the implications of this molecular profiling tool on the subsequent decision-making as to whether to add adjuvant chemotherapy to adjuvant antiestrogen therapy.  Should this return with a low-risk score, I noted that I would recommend only antiestrogen therapy.  Should it return with a high-risk score, I would recommend adding adjuvant chemotherapy, specifically preliminarily discussing adjuvant TC chemotherapy every 3 weeks for 4 cycles with Onpro for hematologic support noting the potential risks, benefits, and anticipated side effects.  The option of using scalp cooling technologies to diminish or avoid chemotherapy-induced alopecia was also preliminarily discussed.  The patient has asked me why I did not recommend dose-dense ACT chemotherapy as Dr. Jacob had done, and I noted that it is a fine regimen, for which there is strong evidence for potential benefit in women with triple negative breast cancer, and those with lymph node positive breast cancer.  As a matter fact, from the recent TAILORx study, there is a minimal difference between TC and dose-dense ACT. \par In the interim The OncotypeDX returned with a RS 31 = 24% ROR at 9  years with GUTIERREZ or AI alone and a group average absolute chemotherapy benefit of ~ 15%. \par She wished to proceed with DD AC>>T. STarted on 3/3/21\par  [de-identified] : ER+ VA+ her 2 lou - [de-identified] : On adjuvant chemotherapy regimen with dose dense doxorubicin/cyclophosphamide with Completed 4/4\par now on paclitaxel/onpro s/p 2/4\par 1. No further symptoms of nausea\par 2. Eating a little better. 3 small meals, drinks about 7 glasses of water per day\par 3. Dysgeusia - Symptoms improved\par 4. no neuropathy\par 5. Anxiety - managing better. takes celexa 20mg daily, xanax  0.25 in the am, has discontinued the afternoon dose, takes ativan at bedtime.\par \par Remains with a stable weight and a stable performance status.

## 2021-05-12 NOTE — END OF VISIT
[FreeTextEntry3] : d/w \par Patient being seen as per physician's primary plan of care.\par  [Time Spent: ___ minutes] : I have spent [unfilled] minutes of time on the encounter.

## 2021-05-12 NOTE — PHYSICAL EXAM
[Restricted in physically strenuous activity but ambulatory and able to carry out work of a light or sedentary nature] : Status 1- Restricted in physically strenuous activity but ambulatory and able to carry out work of a light or sedentary nature, e.g., light house work, office work [Thin] : thin [Normal] : affect appropriate [de-identified] : s/p b/l mastectomies with reconstruction.

## 2021-05-12 NOTE — REVIEW OF SYSTEMS
[Negative] : Endocrine [FreeTextEntry2] : as above [FreeTextEntry7] : as above [de-identified] : as above

## 2021-05-19 ENCOUNTER — NON-APPOINTMENT (OUTPATIENT)
Age: 46
End: 2021-05-19

## 2021-05-20 DIAGNOSIS — G47.00 INSOMNIA, UNSPECIFIED: ICD-10-CM

## 2021-05-25 ENCOUNTER — OUTPATIENT (OUTPATIENT)
Dept: OUTPATIENT SERVICES | Facility: HOSPITAL | Age: 46
LOS: 1 days | Discharge: HOME | End: 2021-05-25

## 2021-05-25 DIAGNOSIS — Z90.49 ACQUIRED ABSENCE OF OTHER SPECIFIED PARTS OF DIGESTIVE TRACT: Chronic | ICD-10-CM

## 2021-05-25 DIAGNOSIS — Z98.890 OTHER SPECIFIED POSTPROCEDURAL STATES: Chronic | ICD-10-CM

## 2021-05-25 DIAGNOSIS — Z85.3 PERSONAL HISTORY OF MALIGNANT NEOPLASM OF BREAST: ICD-10-CM

## 2021-05-26 ENCOUNTER — RESULT REVIEW (OUTPATIENT)
Age: 46
End: 2021-05-26

## 2021-05-26 ENCOUNTER — LABORATORY RESULT (OUTPATIENT)
Age: 46
End: 2021-05-26

## 2021-05-26 ENCOUNTER — APPOINTMENT (OUTPATIENT)
Dept: INFUSION THERAPY | Facility: HOSPITAL | Age: 46
End: 2021-05-26

## 2021-05-26 ENCOUNTER — APPOINTMENT (OUTPATIENT)
Dept: HEMATOLOGY ONCOLOGY | Facility: CLINIC | Age: 46
End: 2021-05-26
Payer: COMMERCIAL

## 2021-05-26 VITALS
BODY MASS INDEX: 16.98 KG/M2 | HEIGHT: 66.57 IN | SYSTOLIC BLOOD PRESSURE: 96 MMHG | DIASTOLIC BLOOD PRESSURE: 65 MMHG | HEART RATE: 74 BPM | WEIGHT: 106.92 LBS | TEMPERATURE: 97.4 F | OXYGEN SATURATION: 98 % | RESPIRATION RATE: 17 BRPM

## 2021-05-26 DIAGNOSIS — Z51.89 ENCOUNTER FOR OTHER SPECIFIED AFTERCARE: ICD-10-CM

## 2021-05-26 DIAGNOSIS — Z51.11 ENCOUNTER FOR ANTINEOPLASTIC CHEMOTHERAPY: ICD-10-CM

## 2021-05-26 DIAGNOSIS — R11.2 NAUSEA WITH VOMITING, UNSPECIFIED: ICD-10-CM

## 2021-05-26 DIAGNOSIS — C50.919 MALIGNANT NEOPLASM OF UNSPECIFIED SITE OF UNSPECIFIED FEMALE BREAST: ICD-10-CM

## 2021-05-26 LAB
BASOPHILS # BLD AUTO: 0.02 K/UL — SIGNIFICANT CHANGE UP (ref 0–0.2)
BASOPHILS NFR BLD AUTO: 0.2 % — SIGNIFICANT CHANGE UP (ref 0–2)
EOSINOPHIL # BLD AUTO: 0 K/UL — SIGNIFICANT CHANGE UP (ref 0–0.5)
EOSINOPHIL NFR BLD AUTO: 0 % — SIGNIFICANT CHANGE UP (ref 0–6)
HCT VFR BLD CALC: 30.7 % — LOW (ref 34.5–45)
HGB BLD-MCNC: 9.9 G/DL — LOW (ref 11.5–15.5)
IMM GRANULOCYTES NFR BLD AUTO: 1.2 % — SIGNIFICANT CHANGE UP (ref 0–1.5)
LYMPHOCYTES # BLD AUTO: 0.34 K/UL — LOW (ref 1–3.3)
LYMPHOCYTES # BLD AUTO: 2.6 % — LOW (ref 13–44)
MCHC RBC-ENTMCNC: 31.2 PG — SIGNIFICANT CHANGE UP (ref 27–34)
MCHC RBC-ENTMCNC: 32.2 G/DL — SIGNIFICANT CHANGE UP (ref 32–36)
MCV RBC AUTO: 96.8 FL — SIGNIFICANT CHANGE UP (ref 80–100)
MONOCYTES # BLD AUTO: 0.03 K/UL — SIGNIFICANT CHANGE UP (ref 0–0.9)
MONOCYTES NFR BLD AUTO: 0.2 % — LOW (ref 2–14)
NEUTROPHILS # BLD AUTO: 12.57 K/UL — HIGH (ref 1.8–7.4)
NEUTROPHILS NFR BLD AUTO: 95.8 % — HIGH (ref 43–77)
NRBC # BLD: 0 /100 WBCS — SIGNIFICANT CHANGE UP (ref 0–0)
PLATELET # BLD AUTO: 179 K/UL — SIGNIFICANT CHANGE UP (ref 150–400)
RBC # BLD: 3.17 M/UL — LOW (ref 3.8–5.2)
RBC # FLD: 19.9 % — HIGH (ref 10.3–14.5)
WBC # BLD: 13.12 K/UL — HIGH (ref 3.8–10.5)
WBC # FLD AUTO: 13.12 K/UL — HIGH (ref 3.8–10.5)

## 2021-05-26 PROCEDURE — 99214 OFFICE O/P EST MOD 30 MIN: CPT

## 2021-05-26 PROCEDURE — 99072 ADDL SUPL MATRL&STAF TM PHE: CPT

## 2021-05-27 NOTE — PHYSICAL EXAM
[Restricted in physically strenuous activity but ambulatory and able to carry out work of a light or sedentary nature] : Status 1- Restricted in physically strenuous activity but ambulatory and able to carry out work of a light or sedentary nature, e.g., light house work, office work [Thin] : thin [Normal] : affect appropriate [de-identified] : s/p b/l mastectomies with reconstruction.

## 2021-05-27 NOTE — REVIEW OF SYSTEMS
[Negative] : Endocrine [FreeTextEntry2] : as above [FreeTextEntry7] : as above [de-identified] : as above

## 2021-05-27 NOTE — END OF VISIT
[Time Spent: ___ minutes] : I have spent [unfilled] minutes of time on the encounter. [FreeTextEntry3] : \par

## 2021-05-27 NOTE — HISTORY OF PRESENT ILLNESS
[Disease: _____________________] : Disease: [unfilled] [T: ___] : T[unfilled] [N: ___] : N[unfilled] [M: ___] : M[unfilled] [AJCC Stage: ____] : AJCC Stage: [unfilled] [de-identified] : The patient has h/o chronic "cystic breasts."  \par \par Her history of present illness began with a breast self-examination in approximately the third week of 11/2020 when she noticed a palpable mass in the upper right breast just above the nipple area.  She called her gynecologist and ultimately scheduled a diagnostic mammogram on 11/25/2020 with the finding of a spiculated mass in the supra-areolar region of the right breast, mid level to the chest wall, with suspicious microcalcifications identified, extending for approximately 4 cm with a suggestion of retraction with extension to the anterior surface of the breast approximately 2 cm inferior to the mass, suspicious microcalcifications identified within the mass; there were scattered groups of calcifications also identified in the left breast with questionable areas of nodularity.  A bilateral breast ultrasound was performed on that same date with a finding of a 4 cm spiculated mass in the right breast, 12 o'clock axis, 1 cm from the nipple, with a note that although other irregular areas of nodularity were present, tissue sampling of this mass was advised under ultrasound-guided core biopsy; the left breast had a 3 mm slightly irregular hypoechoic nodule at the 4 o'clock axis, 6 cm from the nipple with no area of suspicious architectural distortion or acoustical shadowing; no abnormal adenopathy was seen in either axilla.  On 12/02/2020, the patient underwent a right breast 12 o'clock axis core biopsy with a finding of moderately differentiated invasive duct carcinoma with focal squamous differentiation, with core biopsy specimen measuring 1.2 cm, estrogen receptor positive (80%), progesterone receptor positive (30%), and HER-2/lou negative (1+ staining).  She subsequently saw Dr. Flor Alfonso who then ordered a bilateral breast MRI which was performed on 12/15/2020 with a finding of a spiculated enhancing mass in the right upper central breast, middle depth which measured at least 3 x 4 cm corresponding to the biopsy-proven malignancy; there was nodule enhancement extending inferior, medial, and superior-lateral to the mass, which most likely represented a hematoma from the recent biopsy although satellite lesions could not be excluded; no evidence of contralateral breast disease was noted.  \par \par The patient ultimately proceeded on to a bilateral mastectomy, with the right mastectomy specimen showing 2 foci of invasive duct carcinoma, poorly differentiated, spanning 35 and 14 mm, respectively (the large focus demonstrated focal squamous differentiation), Mia-SBR score 9, margins negative, with non-extensive DCIS noted, evidence of lymphovascular invasion was identified, and 1/1 sentinel lymph node with a micrometastasis (1.6 mm), and 1/1 sentinel lymph node returning negative for metastasis.\par \par The patient had germline cancer genetic testing with a BRCA1/2 analyses with [employer] [facility] RNA Insight testing which returned with no pathogenic mutation.\par \par The patient was seen by Dr. Gina Jacob in consultation who recommended adjuvant dose-dense ACT per patient's verbal report; I do not have a copy of those reports for my review.\par \par She saw me in initial consultation on 2/2/21. I had an extensive discussion with the patient regarding her recently diagnosed T2 N1mi M0, stage IIA breast cancer, noting the potential implications of her pathologic prognostic factors on the subsequent risk of developing local and metastatic recurrence.  In light of the above, I recommended adjuvant antiestrogen therapy, with tamoxifen should she be chemically premenopausal versus an aromatase inhibitor such as anastrozole if she is chemically menopausal, noting the potential risks, benefits, anticipated side effects, as well as potential reduction of these two approaches regarding the risk of developing metastatic recurrence.  She has been amenorrheic for 13 months, and I have recommended she have an FSH, LH, and estradiol level to better determine whether she is fully postmenopausal or perimenopausal at this time prior to making definitive treatment recommendations regarding antiestrogen therapy.  In addition, I have recommended that she allow us to send her tumor off for Oncotype DX testing, noting the implications of this molecular profiling tool on the subsequent decision-making as to whether to add adjuvant chemotherapy to adjuvant antiestrogen therapy.  Should this return with a low-risk score, I noted that I would recommend only antiestrogen therapy.  Should it return with a high-risk score, I would recommend adding adjuvant chemotherapy, specifically preliminarily discussing adjuvant TC chemotherapy every 3 weeks for 4 cycles with Onpro for hematologic support noting the potential risks, benefits, and anticipated side effects.  The option of using scalp cooling technologies to diminish or avoid chemotherapy-induced alopecia was also preliminarily discussed.  The patient has asked me why I did not recommend dose-dense ACT chemotherapy as Dr. Jacob had done, and I noted that it is a fine regimen, for which there is strong evidence for potential benefit in women with triple negative breast cancer, and those with lymph node positive breast cancer.  As a matter fact, from the recent TAILORx study, there is a minimal difference between TC and dose-dense ACT. \par In the interim The OncotypeDX returned with a RS 31 = 24% ROR at 9  years with GUTIERREZ or AI alone and a group average absolute chemotherapy benefit of ~ 15%. \par She wished to proceed with DD AC>>T. STarted on 3/3/21\par  [de-identified] : ER+ OR+ her 2 lou - [de-identified] : On adjuvant chemotherapy regimen with dose dense doxorubicin/cyclophosphamide with Completed 4/4\par S/P paclitaxel/onpro s/p 2/4\par \par Day 3 bony aches from taxol. Low grade generalized malaise for days 2-4 . Eating much much more but weight is only stable.  \par \par Has perhaps minimal / transient PN B/L thumbs vs discomfort as she is her nails appear to be lifting off her nail bed centrally from chemo \par \par Anxiety - managing much better -freq notes Celexa / xanax  has helped. \par \par Remains with a stable weight and a stable performance status.

## 2021-06-09 ENCOUNTER — APPOINTMENT (OUTPATIENT)
Dept: INFUSION THERAPY | Facility: HOSPITAL | Age: 46
End: 2021-06-09

## 2021-06-09 ENCOUNTER — LABORATORY RESULT (OUTPATIENT)
Age: 46
End: 2021-06-09

## 2021-06-09 ENCOUNTER — RESULT REVIEW (OUTPATIENT)
Age: 46
End: 2021-06-09

## 2021-06-09 ENCOUNTER — APPOINTMENT (OUTPATIENT)
Dept: HEMATOLOGY ONCOLOGY | Facility: CLINIC | Age: 46
End: 2021-06-09
Payer: COMMERCIAL

## 2021-06-09 VITALS
DIASTOLIC BLOOD PRESSURE: 70 MMHG | TEMPERATURE: 97.2 F | BODY MASS INDEX: 16.81 KG/M2 | RESPIRATION RATE: 17 BRPM | SYSTOLIC BLOOD PRESSURE: 101 MMHG | HEART RATE: 86 BPM | OXYGEN SATURATION: 98 % | HEIGHT: 66.57 IN | WEIGHT: 105.82 LBS

## 2021-06-09 LAB
BASOPHILS # BLD AUTO: 0 K/UL — SIGNIFICANT CHANGE UP (ref 0–0.2)
BASOPHILS NFR BLD AUTO: 0 % — SIGNIFICANT CHANGE UP (ref 0–2)
EOSINOPHIL # BLD AUTO: 0 K/UL — SIGNIFICANT CHANGE UP (ref 0–0.5)
EOSINOPHIL NFR BLD AUTO: 0 % — SIGNIFICANT CHANGE UP (ref 0–6)
HCT VFR BLD CALC: 31.5 % — LOW (ref 34.5–45)
HGB BLD-MCNC: 10.5 G/DL — LOW (ref 11.5–15.5)
LYMPHOCYTES # BLD AUTO: 0.47 K/UL — LOW (ref 1–3.3)
LYMPHOCYTES # BLD AUTO: 3 % — LOW (ref 13–44)
MCHC RBC-ENTMCNC: 32.5 PG — SIGNIFICANT CHANGE UP (ref 27–34)
MCHC RBC-ENTMCNC: 33.3 G/DL — SIGNIFICANT CHANGE UP (ref 32–36)
MCV RBC AUTO: 97.5 FL — SIGNIFICANT CHANGE UP (ref 80–100)
MONOCYTES # BLD AUTO: 0 K/UL — SIGNIFICANT CHANGE UP (ref 0–0.9)
MONOCYTES NFR BLD AUTO: 0 % — LOW (ref 2–14)
NEUTROPHILS # BLD AUTO: 15.06 K/UL — HIGH (ref 1.8–7.4)
NEUTROPHILS NFR BLD AUTO: 97 % — HIGH (ref 43–77)
NRBC # BLD: 0 /100 — SIGNIFICANT CHANGE UP (ref 0–0)
NRBC # BLD: SIGNIFICANT CHANGE UP /100 WBCS (ref 0–0)
PLAT MORPH BLD: NORMAL — SIGNIFICANT CHANGE UP
PLATELET # BLD AUTO: 160 K/UL — SIGNIFICANT CHANGE UP (ref 150–400)
POIKILOCYTOSIS BLD QL AUTO: SLIGHT — SIGNIFICANT CHANGE UP
RBC # BLD: 3.23 M/UL — LOW (ref 3.8–5.2)
RBC # FLD: 17.3 % — HIGH (ref 10.3–14.5)
RBC BLD AUTO: ABNORMAL
SCHISTOCYTES BLD QL AUTO: SLIGHT — SIGNIFICANT CHANGE UP
WBC # BLD: 15.53 K/UL — HIGH (ref 3.8–10.5)
WBC # FLD AUTO: 15.53 K/UL — HIGH (ref 3.8–10.5)

## 2021-06-09 PROCEDURE — 99214 OFFICE O/P EST MOD 30 MIN: CPT

## 2021-06-09 PROCEDURE — 99072 ADDL SUPL MATRL&STAF TM PHE: CPT

## 2021-06-14 ENCOUNTER — APPOINTMENT (OUTPATIENT)
Dept: RADIATION ONCOLOGY | Facility: CLINIC | Age: 46
End: 2021-06-14
Payer: COMMERCIAL

## 2021-06-14 DIAGNOSIS — Z80.0 FAMILY HISTORY OF MALIGNANT NEOPLASM OF DIGESTIVE ORGANS: ICD-10-CM

## 2021-06-14 DIAGNOSIS — Z63.5 DISRUPTION OF FAMILY BY SEPARATION AND DIVORCE: ICD-10-CM

## 2021-06-14 DIAGNOSIS — Z78.9 OTHER SPECIFIED HEALTH STATUS: ICD-10-CM

## 2021-06-14 PROCEDURE — 99215 OFFICE O/P EST HI 40 MIN: CPT | Mod: 95

## 2021-06-14 SDOH — SOCIAL STABILITY - SOCIAL INSECURITY: DISRUPTION OF FAMILY BY SEPARATION AND DIVORCE: Z63.5

## 2021-06-14 NOTE — VITALS
[Maximal Pain Intensity: 0/10] : 0/10 [70: Cares for self; unalbe to carry on normal activity or do active work.] : 70: Cares for self; unable to carry on normal activity or do active work. [ECOG Performance Status: 2 - Ambulatory and capable of all self care but unable to carry out any work activities] : Performance Status: 2 - Ambulatory and capable of all self care but unable to carry out any work activities. Up and about more than 50% of waking hours

## 2021-06-14 NOTE — OB/GYN HISTORY
[Definite:  ___ (Date)] : the last menstrual period was [unfilled] [Menopause Age: ____] : patient was [unfilled] years old at menopause [___] : Full Term: [unfilled]

## 2021-06-15 NOTE — PHYSICAL EXAM
[Restricted in physically strenuous activity but ambulatory and able to carry out work of a light or sedentary nature] : Status 1- Restricted in physically strenuous activity but ambulatory and able to carry out work of a light or sedentary nature, e.g., light house work, office work [Thin] : thin [Normal] : affect appropriate [de-identified] : s/p b/l mastectomies with reconstruction.

## 2021-06-15 NOTE — HISTORY OF PRESENT ILLNESS
[Disease: _____________________] : Disease: [unfilled] [T: ___] : T[unfilled] [N: ___] : N[unfilled] [M: ___] : M[unfilled] [AJCC Stage: ____] : AJCC Stage: [unfilled] [de-identified] : The patient has h/o chronic "cystic breasts."  \par \par Her history of present illness began with a breast self-examination in approximately the third week of 11/2020 when she noticed a palpable mass in the upper right breast just above the nipple area.  She called her gynecologist and ultimately scheduled a diagnostic mammogram on 11/25/2020 with the finding of a spiculated mass in the supra-areolar region of the right breast, mid level to the chest wall, with suspicious microcalcifications identified, extending for approximately 4 cm with a suggestion of retraction with extension to the anterior surface of the breast approximately 2 cm inferior to the mass, suspicious microcalcifications identified within the mass; there were scattered groups of calcifications also identified in the left breast with questionable areas of nodularity.  A bilateral breast ultrasound was performed on that same date with a finding of a 4 cm spiculated mass in the right breast, 12 o'clock axis, 1 cm from the nipple, with a note that although other irregular areas of nodularity were present, tissue sampling of this mass was advised under ultrasound-guided core biopsy; the left breast had a 3 mm slightly irregular hypoechoic nodule at the 4 o'clock axis, 6 cm from the nipple with no area of suspicious architectural distortion or acoustical shadowing; no abnormal adenopathy was seen in either axilla.  On 12/02/2020, the patient underwent a right breast 12 o'clock axis core biopsy with a finding of moderately differentiated invasive duct carcinoma with focal squamous differentiation, with core biopsy specimen measuring 1.2 cm, estrogen receptor positive (80%), progesterone receptor positive (30%), and HER-2/lou negative (1+ staining).  She subsequently saw Dr. Flor Alfonso who then ordered a bilateral breast MRI which was performed on 12/15/2020 with a finding of a spiculated enhancing mass in the right upper central breast, middle depth which measured at least 3 x 4 cm corresponding to the biopsy-proven malignancy; there was nodule enhancement extending inferior, medial, and superior-lateral to the mass, which most likely represented a hematoma from the recent biopsy although satellite lesions could not be excluded; no evidence of contralateral breast disease was noted.  \par \par The patient ultimately proceeded on to a bilateral mastectomy, with the right mastectomy specimen showing 2 foci of invasive duct carcinoma, poorly differentiated, spanning 35 and 14 mm, respectively (the large focus demonstrated focal squamous differentiation), Mia-SBR score 9, margins negative, with non-extensive DCIS noted, evidence of lymphovascular invasion was identified, and 1/1 sentinel lymph node with a micrometastasis (1.6 mm), and 1/1 sentinel lymph node returning negative for metastasis.\par \par The patient had germline cancer genetic testing with a BRCA1/2 analyses with [employer] [facility] RNA Insight testing which returned with no pathogenic mutation.\par \par The patient was seen by Dr. Gina Jacob in consultation who recommended adjuvant dose-dense ACT per patient's verbal report; I do not have a copy of those reports for my review.\par \par She saw me in initial consultation on 2/2/21. I had an extensive discussion with the patient regarding her recently diagnosed T2 N1mi M0, stage IIA breast cancer, noting the potential implications of her pathologic prognostic factors on the subsequent risk of developing local and metastatic recurrence.  In light of the above, I recommended adjuvant antiestrogen therapy, with tamoxifen should she be chemically premenopausal versus an aromatase inhibitor such as anastrozole if she is chemically menopausal, noting the potential risks, benefits, anticipated side effects, as well as potential reduction of these two approaches regarding the risk of developing metastatic recurrence.  She has been amenorrheic for 13 months, and I have recommended she have an FSH, LH, and estradiol level to better determine whether she is fully postmenopausal or perimenopausal at this time prior to making definitive treatment recommendations regarding antiestrogen therapy.  In addition, I have recommended that she allow us to send her tumor off for Oncotype DX testing, noting the implications of this molecular profiling tool on the subsequent decision-making as to whether to add adjuvant chemotherapy to adjuvant antiestrogen therapy.  Should this return with a low-risk score, I noted that I would recommend only antiestrogen therapy.  Should it return with a high-risk score, I would recommend adding adjuvant chemotherapy, specifically preliminarily discussing adjuvant TC chemotherapy every 3 weeks for 4 cycles with Onpro for hematologic support noting the potential risks, benefits, and anticipated side effects.  The option of using scalp cooling technologies to diminish or avoid chemotherapy-induced alopecia was also preliminarily discussed.  The patient has asked me why I did not recommend dose-dense ACT chemotherapy as Dr. Jacob had done, and I noted that it is a fine regimen, for which there is strong evidence for potential benefit in women with triple negative breast cancer, and those with lymph node positive breast cancer.  As a matter fact, from the recent TAILORx study, there is a minimal difference between TC and dose-dense ACT. \par In the interim The OncotypeDX returned with a RS 31 = 24% ROR at 9  years with GUTIERREZ or AI alone and a group average absolute chemotherapy benefit of ~ 15%. \par She wished to proceed with DD AC>>T. STarted on 3/3/21\par  [de-identified] : ER+ NJ+ her 2 lou - [de-identified] : On adjuvant chemotherapy regimen with dose dense doxorubicin/cyclophosphamide with Completed 4/4\par S/P paclitaxel/onpro s/p 3/4\par \par 1. Day 3 bony aches from taxol. \par 2. Low grade generalized malaise for days 2-4 \par 3. Ongoing dysgeusia\par 4. Transient PN b/l thumbs\par 5.Anxiety - managing much better -freq notes Celexa / xanax  has helped. \par \par Remains with a stable weight and a stable performance status.

## 2021-06-15 NOTE — REVIEW OF SYSTEMS
[Negative] : Endocrine [FreeTextEntry2] : as above [FreeTextEntry7] : as above [de-identified] : as above

## 2021-06-15 NOTE — END OF VISIT
[Time Spent: ___ minutes] : I have spent [unfilled] minutes of time on the encounter. [FreeTextEntry3] : Patient being seen as per physician's primary plan of care.\par seen and d/w \par

## 2021-06-16 ENCOUNTER — OUTPATIENT (OUTPATIENT)
Dept: OUTPATIENT SERVICES | Facility: HOSPITAL | Age: 46
LOS: 1 days | Discharge: HOME | End: 2021-06-16
Payer: COMMERCIAL

## 2021-06-16 DIAGNOSIS — Z98.890 OTHER SPECIFIED POSTPROCEDURAL STATES: Chronic | ICD-10-CM

## 2021-06-16 DIAGNOSIS — Z90.49 ACQUIRED ABSENCE OF OTHER SPECIFIED PARTS OF DIGESTIVE TRACT: Chronic | ICD-10-CM

## 2021-06-16 PROCEDURE — 99072 ADDL SUPL MATRL&STAF TM PHE: CPT

## 2021-06-16 PROCEDURE — 77263 THER RADIOLOGY TX PLNG CPLX: CPT

## 2021-06-18 PROBLEM — Z78.9 NO PERTINENT PAST MEDICAL HISTORY: Status: RESOLVED | Noted: 2021-06-18 | Resolved: 2021-06-18

## 2021-06-18 NOTE — REVIEW OF SYSTEMS
[Fever] : fever [Anxiety] : anxiety [Depression] : depression [Negative] : Allergic/Immunologic [Skin Rash] : no skin rash [FreeTextEntry9] : muscles aches [Suicidal] : not suicidal [de-identified] : mild neuropathy fingers

## 2021-06-18 NOTE — HISTORY OF PRESENT ILLNESS
[Home] : at home, [unfilled] , at the time of the visit. [Medical Office: ([facility])___] : at the medical office located in  [Verbal consent obtained from patient] : the patient, [unfilled] [FreeTextEntry1] : Ms. Lerman is a 45 year old female with hormone receptor positive right breast cancer, who is s/p bilateral mastectomy and SLNB demonstrating micrometastasis.  She has completed adjuvant chemotherapy, and presents for radiotherapy recommendations.\par \par Patient detected a palpable mass on right breast in 11/2020.  She underwent a diagnostic mammogram on 11/25/2020 showing a spiculated mass in the supra areolar region of the right breast with suspicious microcalcifications extending approximately 4cm.   There were scattered groups of calcifications also identified in the left breast with questionable areas of nodularity.  A bilateral breast ultrasound showed a 4 cm spiculated mass in the right breast, 12:00, 1 cm fn. US left breast demonstrated a 3 mm slightly irregular hypoechoic nodule at 4:00, 6 cm from the nipple.\par \par On 12/02/2020, the patient underwent a right breast 12:00 core biopsy, demonstrating moderately differentiated invasive ductal carcinoma with focal squamous differentiation, measuring 1.2 cm in maximum dimension, ER positive (80%), OR positive (30%), and HER-2/lou negative. DCIS with solid pattern, necrosis and intermediate nuclear grade was also present. \par \par On 12/15/2020, she underwent breast MRI showing a spiculated enhancing mass in the right upper central breast, middle depth which measuring 3 x 4 cm corresponding to the biopsy-proven malignancy; there was nodular enhancement extending inferiorly, medially, and superior-laterally to the mass, which most likely represented a hematoma from the recent biopsy although satellite lesions could not be excluded; there was no evidence of contralateral breast disease. \par \par On 1/14/2021, she underwent bilateral mastectomies, with the right mastectomy specimen showing 2 foci of invasive duct carcinoma, poorly differentiated, spanning 35 and 14 mm, respectively (the larger focus demonstrated focal squamous differentiation), [place]-SBR score 9, margins negative, with non-extensive DCIS noted, evidence of lymphovascular invasion was identified, and one sentinel lymph node with a micrometastasis (1.6 mm), and one sentinel lymph node returning negative for metastasis. Oncotype dX RS 31. The larger mass was ER 90%, OR 90% HER2 1+. The area with squamous differentiation was ER negative, OR negative and HER2 negative. The smaller mass was ER 99%, OR 10% and HER2 negative 1+. The metastatic focus in the lymph node was ER positive. \par \par She received adjuvant chemotherapy with dd AC-T, which she started on 3/3/2021.  Last dose of taxol on 6/9/2021.  She reports having anxiety/depression related to the diagnosis and has been taking Celexa and Xanax, which have been helpful. She has irritable bowel syndrome symptoms currently, which she attributes to chemotherapy and anxiety.  She finds that a heating pad on the abdomen is soothing and helpful.

## 2021-06-18 NOTE — LETTER CLOSING
[Consult Closing:] : Thank you for allowing me to participate in the care of this patient.  If you have any questions, please do not hesitate to contact me. [Sincerely yours,] : Sincerely yours, [FreeTextEntry3] : Mary Ann Ibrahim MD\par

## 2021-06-21 ENCOUNTER — OUTPATIENT (OUTPATIENT)
Dept: OUTPATIENT SERVICES | Facility: HOSPITAL | Age: 46
LOS: 1 days | End: 2021-06-21
Payer: COMMERCIAL

## 2021-06-21 ENCOUNTER — RESULT REVIEW (OUTPATIENT)
Age: 46
End: 2021-06-21

## 2021-06-21 VITALS
SYSTOLIC BLOOD PRESSURE: 95 MMHG | OXYGEN SATURATION: 98 % | HEART RATE: 61 BPM | DIASTOLIC BLOOD PRESSURE: 53 MMHG | RESPIRATION RATE: 18 BRPM

## 2021-06-21 VITALS
HEIGHT: 65 IN | DIASTOLIC BLOOD PRESSURE: 65 MMHG | TEMPERATURE: 99 F | HEART RATE: 64 BPM | OXYGEN SATURATION: 99 % | WEIGHT: 104.94 LBS | RESPIRATION RATE: 18 BRPM | SYSTOLIC BLOOD PRESSURE: 95 MMHG

## 2021-06-21 DIAGNOSIS — Z98.890 OTHER SPECIFIED POSTPROCEDURAL STATES: Chronic | ICD-10-CM

## 2021-06-21 DIAGNOSIS — C50.911 MALIGNANT NEOPLASM OF UNSPECIFIED SITE OF RIGHT FEMALE BREAST: ICD-10-CM

## 2021-06-21 DIAGNOSIS — Z90.49 ACQUIRED ABSENCE OF OTHER SPECIFIED PARTS OF DIGESTIVE TRACT: Chronic | ICD-10-CM

## 2021-06-21 PROCEDURE — 36590 REMOVAL TUNNELED CV CATH: CPT

## 2021-06-21 NOTE — ASU DISCHARGE PLAN (ADULT/PEDIATRIC) - ASU DC SPECIAL INSTRUCTIONSFT
You had your port removed. There is purple glue on the incision site that should not get wet for 24 hours.  The purple glue will fall off on its own after 3-4 weeks. If you notice any concerning symptoms including fever and chills, please call your physician immediately.

## 2021-06-21 NOTE — PRE PROCEDURE NOTE - PRE PROCEDURE EVALUATION
Interventional Radiology Pre Procedure Note    HPI: 45y Female with breast cancer now finished with chemotherapy. Port removal is requested.     Allergies: amoxicillin (Rash)    Medications (Abx/Cardiac/Anticoagulation/Blood Products)      Data:  165.1  47.6  T(C): 37  HR: 64  BP: 95/65  RR: 18  SpO2: 99%    Exam  General: No acute distress  Chest: Non labored breathing    Plan: 45y Female presents for port removal. Procedure and risks discussed with patient and she is agreeable to proceed.   -Risks/Benefits/alternatives explained with the patient and/or healthcare proxy and witnessed informed consent obtained.

## 2021-06-21 NOTE — ASU PATIENT PROFILE, ADULT - PSH
History of cholecystectomy    History of myomectomy    Status post excision of fibroadenoma of breast

## 2021-06-29 DIAGNOSIS — Z45.2 ENCOUNTER FOR ADJUSTMENT AND MANAGEMENT OF VASCULAR ACCESS DEVICE: ICD-10-CM

## 2021-07-01 ENCOUNTER — NON-APPOINTMENT (OUTPATIENT)
Age: 46
End: 2021-07-01

## 2021-07-01 ENCOUNTER — APPOINTMENT (OUTPATIENT)
Dept: RADIATION ONCOLOGY | Facility: CLINIC | Age: 46
End: 2021-07-01
Payer: COMMERCIAL

## 2021-07-01 VITALS
RESPIRATION RATE: 16 BRPM | WEIGHT: 104.28 LBS | SYSTOLIC BLOOD PRESSURE: 93 MMHG | DIASTOLIC BLOOD PRESSURE: 62 MMHG | BODY MASS INDEX: 16.54 KG/M2 | HEART RATE: 82 BPM | OXYGEN SATURATION: 100 %

## 2021-07-01 PROCEDURE — 77333 RADIATION TREATMENT AID(S): CPT | Mod: 26

## 2021-07-01 PROCEDURE — 99072 ADDL SUPL MATRL&STAF TM PHE: CPT

## 2021-07-01 PROCEDURE — 99213 OFFICE O/P EST LOW 20 MIN: CPT | Mod: 25

## 2021-07-01 PROCEDURE — 77290 THER RAD SIMULAJ FIELD CPLX: CPT | Mod: 26

## 2021-07-01 NOTE — VITALS
[Maximal Pain Intensity: 0/10] : 0/10 [70: Cares for self; unalbe to carry on normal activity or do active work.] : 70: Cares for self; unable to carry on normal activity or do active work. [ECOG Performance Status: 2 - Ambulatory and capable of all self care but unable to carry out any work activities] : Performance Status: 2 - Ambulatory and capable of all self care but unable to carry out any work activities. Up and about more than 50% of waking hours [NoTreatment Scheduled] : no treatment scheduled

## 2021-07-07 ENCOUNTER — NON-APPOINTMENT (OUTPATIENT)
Age: 46
End: 2021-07-07

## 2021-07-08 PROCEDURE — 77295 3-D RADIOTHERAPY PLAN: CPT | Mod: 26

## 2021-07-08 PROCEDURE — 77334 RADIATION TREATMENT AID(S): CPT | Mod: 26

## 2021-07-08 PROCEDURE — 77300 RADIATION THERAPY DOSE PLAN: CPT | Mod: 26

## 2021-07-08 NOTE — PHYSICAL EXAM
[General Appearance - In No Acute Distress] : in no acute distress [Thin] : thin [Sclera] : the sclera and conjunctiva were normal [] : no respiratory distress [Heart Rate And Rhythm] : heart rate and rhythm were normal [No UE Edema] : there is no upper extremity edema [Abdomen Soft] : soft [Nondistended] : nondistended [Abdomen Tenderness] : non-tender [Supraclavicular Lymph Nodes Enlarged Bilaterally] : supraclavicular [Axillary Lymph Nodes Enlarged Bilaterally] : axillary [Normal] : oriented to person, place and time, the affect was normal, the mood was normal and not anxious [de-identified] : breasts surgically absent, bilateral expanders in place, skin without nodules or lesions

## 2021-07-08 NOTE — REVIEW OF SYSTEMS
[Fever] : fever [Anxiety] : anxiety [Depression] : depression [Negative] : Allergic/Immunologic [Skin Rash] : no skin rash [Suicidal] : not suicidal [FreeTextEntry9] : muscles aches [de-identified] : mild neuropathy fingers

## 2021-07-08 NOTE — HISTORY OF PRESENT ILLNESS
[FreeTextEntry1] : Ms. Lerman is a 45 year old female with hormone receptor positive right breast cancer, who is s/p bilateral mastectomy and SLNB demonstrating micrometastasis.  She has completed adjuvant chemotherapy, and presents for radiotherapy recommendations.\par \par Patient detected a palpable mass on right breast in 11/2020.  She underwent a diagnostic mammogram on 11/25/2020 showing a spiculated mass in the supra areolar region of the right breast with suspicious microcalcifications extending approximately 4cm.   There were scattered groups of calcifications also identified in the left breast with questionable areas of nodularity.  A bilateral breast ultrasound showed a 4 cm spiculated mass in the right breast, 12:00, 1 cm fn. US left breast demonstrated a 3 mm slightly irregular hypoechoic nodule at 4:00, 6 cm from the nipple.\par \par On 12/02/2020, the patient underwent a right breast 12:00 core biopsy, demonstrating moderately differentiated invasive ductal carcinoma with focal squamous differentiation, measuring 1.2 cm in maximum dimension, ER positive (80%), VA positive (30%), and HER-2/lou negative. DCIS with solid pattern, necrosis and intermediate nuclear grade was also present. \par \par On 12/15/2020, she underwent breast MRI showing a spiculated enhancing mass in the right upper central breast, middle depth which measuring 3 x 4 cm corresponding to the biopsy-proven malignancy; there was nodular enhancement extending inferiorly, medially, and superior-laterally to the mass, which most likely represented a hematoma from the recent biopsy although satellite lesions could not be excluded; there was no evidence of contralateral breast disease. \par \par On 1/14/2021, she underwent bilateral mastectomies, with the right mastectomy specimen showing 2 foci of invasive duct carcinoma, poorly differentiated, spanning 35 and 14 mm, respectively (the larger focus demonstrated focal squamous differentiation), [place]-SBR score 9, margins negative, with non-extensive DCIS noted, evidence of lymphovascular invasion was identified, and one sentinel lymph node with a micrometastasis (1.6 mm), and one sentinel lymph node returning negative for metastasis. Oncotype dX RS 31. The larger mass was ER 90%, VA 90% HER2 1+. The area with squamous differentiation was ER negative, VA negative and HER2 negative. The smaller mass was ER 99%, VA 10% and HER2 negative 1+. The metastatic focus in the lymph node was ER positive. \par \par She received adjuvant chemotherapy with dd AC-T, which she started on 3/3/2021.  She reports having anxiety/depression related to the diagnosis and has been taking Celexa and Xanax, which have been helpful. She has irritable bowel syndrome symptoms currently, which she attributes to chemotherapy and anxiety.  She finds that a heating pad on the abdomen is soothing and helpful.  \par \par She completed chemotherapy on 6/9/2021.  Effects from treatment are slowly improving. She is fatigued. She denies pain or discomfort from expanders. \par

## 2021-07-20 PROCEDURE — 77280 THER RAD SIMULAJ FIELD SMPL: CPT | Mod: 26

## 2021-07-21 ENCOUNTER — NON-APPOINTMENT (OUTPATIENT)
Age: 46
End: 2021-07-21

## 2021-07-22 NOTE — PHYSICAL EXAM
[Normal] : well developed, well nourished, in no acute distress [de-identified] : breasts surgically absent, bilateral expanders in place, right chest wall without erythema

## 2021-07-22 NOTE — VITALS
[Maximal Pain Intensity: 0/10] : 0/10 [NoTreatment Scheduled] : no treatment scheduled [80: Normal activity with effort; some signs or symptoms of disease.] : 80: Normal activity with effort; some signs or symptoms of disease.  [ECOG Performance Status: 1 - Restricted in physically strenuous activity but ambulatory and able to carry out work of a light or sedentary nature] : Performance Status: 1 - Restricted in physically strenuous activity but ambulatory and able to carry out work of a light or sedentary nature, e.g., light house work, office work

## 2021-07-22 NOTE — HISTORY OF PRESENT ILLNESS
[FreeTextEntry1] : Ms. Lerman is a 45 year old woman with stage IIA, [id number](sn)M0 hormone receptor positive, HER2 negative invasive ductal carcinoma of the right breast. She underwent mastectomy with negative margins and had micrometastatic disease in 1 of 2 sentinel lymph nodes. She completed adjuvant chemotherapy with dose dense AC-T.  She is now receiving adjuvant post-mastectomy radiation therapy. \par \par She expressed concern that her eyelashes and eyebrows are falling out, long after her hair fell out and when she expected it might happen, as she completed chemotherapy 5 weeks ago.  Otherwise, she is feeling generally well. She denies fatigue and pain. She has not noted any skin reactions. She is using a natural moisturizer and also has aloe vera but didn't start yet. \par \par

## 2021-07-22 NOTE — DISEASE MANAGEMENT
[Pathological] : TNM Stage: p [IIA] : IIA [TTNM] : 2 [NTNM] : 1mi [MTNM] : 0 [de-identified] : 200cGy [de-identified] : 5000cGy [de-identified] : Right breast

## 2021-07-26 ENCOUNTER — NON-APPOINTMENT (OUTPATIENT)
Age: 46
End: 2021-07-26

## 2021-07-27 ENCOUNTER — NON-APPOINTMENT (OUTPATIENT)
Age: 46
End: 2021-07-27

## 2021-07-27 PROCEDURE — 77427 RADIATION TX MANAGEMENT X5: CPT

## 2021-07-28 ENCOUNTER — NON-APPOINTMENT (OUTPATIENT)
Age: 46
End: 2021-07-28

## 2021-07-29 ENCOUNTER — NON-APPOINTMENT (OUTPATIENT)
Age: 46
End: 2021-07-29

## 2021-07-30 NOTE — HISTORY OF PRESENT ILLNESS
[FreeTextEntry1] : Ms. Lerman is a 45 year old woman with stage IIA, [id number](sn)M0 hormone receptor positive, HER2 negative invasive ductal carcinoma of the right breast. She underwent mastectomy with negative margins and had micrometastatic disease in 1 of 2 sentinel lymph nodes. She completed adjuvant chemotherapy with dose dense AC-T.  She is now receiving adjuvant post-mastectomy radiation therapy. \par \par She is feeling generally well. She denies fatigue and pain. She has not noted any skin reactions. She is using a natural moisturizer and also has aloe vera.\par \par

## 2021-07-30 NOTE — PHYSICAL EXAM
[Normal] : well developed, well nourished, in no acute distress [de-identified] : breasts surgically absent, bilateral expanders in place, right chest wall without signif erythema

## 2021-07-30 NOTE — DISEASE MANAGEMENT
[Pathological] : TNM Stage: p [IIA] : IIA [TTNM] : 2 [NTNM] : 1mi [MTNM] : 0 [de-identified] : 1200cGy [de-identified] : 5000cGy [de-identified] : Right breast

## 2021-08-03 PROCEDURE — 77427 RADIATION TX MANAGEMENT X5: CPT

## 2021-08-04 ENCOUNTER — NON-APPOINTMENT (OUTPATIENT)
Age: 46
End: 2021-08-04

## 2021-08-05 ENCOUNTER — NON-APPOINTMENT (OUTPATIENT)
Age: 46
End: 2021-08-05

## 2021-08-06 ENCOUNTER — NON-APPOINTMENT (OUTPATIENT)
Age: 46
End: 2021-08-06

## 2021-08-10 PROCEDURE — 77427 RADIATION TX MANAGEMENT X5: CPT

## 2021-08-13 ENCOUNTER — NON-APPOINTMENT (OUTPATIENT)
Age: 46
End: 2021-08-13

## 2021-08-18 ENCOUNTER — NON-APPOINTMENT (OUTPATIENT)
Age: 46
End: 2021-08-18

## 2021-08-19 ENCOUNTER — NON-APPOINTMENT (OUTPATIENT)
Age: 46
End: 2021-08-19

## 2021-08-19 PROCEDURE — 77427 RADIATION TX MANAGEMENT X5: CPT

## 2021-08-20 ENCOUNTER — NON-APPOINTMENT (OUTPATIENT)
Age: 46
End: 2021-08-20

## 2021-08-20 NOTE — PHYSICAL EXAM
[Normal] : well developed, well nourished, in no acute distress [de-identified] : breasts surgically absent, bilateral expanders in place, right chest wall with mild erythema and hyperpigmentation.

## 2021-08-20 NOTE — HISTORY OF PRESENT ILLNESS
[FreeTextEntry1] : Ms. Lerman is a 45 year old woman with stage IIA, [id number](sn)M0 hormone receptor positive, HER2 negative invasive ductal carcinoma of the right breast. She underwent mastectomy with negative margins and had micrometastatic disease in 1 of 2 sentinel lymph nodes. She completed adjuvant chemotherapy with dose dense AC-T.  She is now receiving adjuvant post-mastectomy radiation therapy. \par \par She is feeling generally well. She denies fatigue and pain. She has noted mild redness to the skin, no pain. She is using a natural moisturizer and also has aloe vera.  The "glue" is off her left mastectomy incision, which is intact.\par \par

## 2021-08-20 NOTE — HISTORY OF PRESENT ILLNESS
[FreeTextEntry1] : Ms. Lerman is a 45 year old woman with stage IIA, [id number](sn)M0 hormone receptor positive, HER2 negative invasive ductal carcinoma of the right breast. She underwent mastectomy with negative margins and had micrometastatic disease in 1 of 2 sentinel lymph nodes. She completed adjuvant chemotherapy with dose dense AC-T.  She is now receiving adjuvant post-mastectomy radiation therapy. \par \par She is feeling generally well. She denies fatigue and pain. She has noted mild redness to the skin, no pain. She is using a natural moisturizer and also has aloe vera.  The "glue" is off her left mastectomy incision, which is intact.\par \par

## 2021-08-20 NOTE — PHYSICAL EXAM
[Normal] : well developed, well nourished, in no acute distress [de-identified] : breasts surgically absent, bilateral expanders in place, right chest wall with mild erythema and hyperpigmentation.

## 2021-08-20 NOTE — DISEASE MANAGEMENT
[Pathological] : TNM Stage: p [IIA] : IIA [TTNM] : 2 [NTNM] : 1mi [MTNM] : 0 [de-identified] : 0720cGy [de-identified] : 5000cGy [de-identified] : Right breast

## 2021-08-20 NOTE — DISEASE MANAGEMENT
[Pathological] : TNM Stage: p [IIA] : IIA [TTNM] : 2 [NTNM] : 1mi [MTNM] : 0 [de-identified] : 6890cGy [de-identified] : 5000cGy [de-identified] : Right breast

## 2021-08-23 ENCOUNTER — NON-APPOINTMENT (OUTPATIENT)
Age: 46
End: 2021-08-23

## 2021-08-24 ENCOUNTER — NON-APPOINTMENT (OUTPATIENT)
Age: 46
End: 2021-08-24

## 2021-08-24 DIAGNOSIS — Z00.00 ENCOUNTER FOR GENERAL ADULT MEDICAL EXAMINATION W/OUT ABNORMAL FINDINGS: ICD-10-CM

## 2021-08-24 PROCEDURE — 77427 RADIATION TX MANAGEMENT X5: CPT

## 2021-08-24 NOTE — VITALS
[NoTreatment Scheduled] : no treatment scheduled [80: Normal activity with effort; some signs or symptoms of disease.] : 80: Normal activity with effort; some signs or symptoms of disease.  [ECOG Performance Status: 1 - Restricted in physically strenuous activity but ambulatory and able to carry out work of a light or sedentary nature] : Performance Status: 1 - Restricted in physically strenuous activity but ambulatory and able to carry out work of a light or sedentary nature, e.g., light house work, office work [Maximal Pain Intensity: 4/10] : 4/10 [Least Pain Intensity: 0/10] : 0/10 [Pain Description/Quality: ___] : Pain description/quality: [unfilled] [Pain Duration: ___] : Pain duration: [unfilled] [Pain Location: ___] : Pain Location: [unfilled]

## 2021-08-25 ENCOUNTER — APPOINTMENT (OUTPATIENT)
Dept: PHYSICAL MEDICINE AND REHAB | Facility: CLINIC | Age: 46
End: 2021-08-25
Payer: COMMERCIAL

## 2021-08-25 ENCOUNTER — NON-APPOINTMENT (OUTPATIENT)
Age: 46
End: 2021-08-25

## 2021-08-25 VITALS
WEIGHT: 105.38 LBS | RESPIRATION RATE: 16 BRPM | OXYGEN SATURATION: 98 % | HEART RATE: 74 BPM | BODY MASS INDEX: 16.72 KG/M2 | DIASTOLIC BLOOD PRESSURE: 62 MMHG | TEMPERATURE: 95.72 F | SYSTOLIC BLOOD PRESSURE: 91 MMHG

## 2021-08-25 PROCEDURE — 99205 OFFICE O/P NEW HI 60 MIN: CPT

## 2021-08-25 NOTE — HISTORY OF PRESENT ILLNESS
[FreeTextEntry1] : Navah Lerman is a 45 year old female with R breast ca s/p b/l mastectomy with micrometastatic disease in 1/2 sentinel LN, now s/p chemo and RT.  She reports occasional very brief, about once a day, states she is comfortable \par \par prediagnosis weight 128, 105 lb today, reports weight is stable but unable to gain weight.   Eats chicken, egg yolk, states she has difficulty with many vegetables, dairy, beef, gluten.  \par \par RUE: 17.5cm    22.5 cm\par \par LUE: 16.5 cm    22 cm

## 2021-08-25 NOTE — ASSESSMENT
[FreeTextEntry1] : 45 year old female pmh R breast ca s/p b/l mastectomy, chemo and radiation\par -patient would like surveillance program to monitor for lymphedema, will follow up next visit at [facility] for sozo measurement\par - patient has small increase in RUE vs LUE however is right handed, difference could be due to hand dominance. denies arm pain or heaviness, no visual swelling noted\par -will start breast PT program for ROM and muscle tightness, as well as home exercise program, stretching\par -follow up in 3-4 weeks, will also discuss role of compression next visit\par -discussed importance of good nutrition, regular exercise program

## 2021-08-25 NOTE — PHYSICAL EXAM
[FreeTextEntry1] : GEN: AAOx3, NAD.\par PSYCH: Normal mood and affect. Responds appropriately to commands.\par EYES: Sclerae Anicteric. No discharge. EOMI.\par RESP: Breathing unlabored.\par CV: Radial pulses 2+ and equal. No varicosities noted.\par EXT: No C/C/E. \par RUE: at 10 cm below olecranon 17.5cm,  at 10 cm above olecranon 22.5 cm\par LUE: at 10 cm below olecranon 16.5 cm, at 10 cm above olecranon   22 cm\par SKIN: No lesions noted. Incisions well healed,  no erythema or swelling \par LYMPH: No supraclavicular, anterior or posterior cervical lymphadenopathy appreciated.\par GAIT: Non antalgic, Normal reciprocating heel to toe.\par STRENGTH: 5/5 bilateral upper extremities\par REFLEXES: 2+ symmetric brachioradialis\par SENSATION: Grossly intact to light touch bilateral upper extremities.\par INSP: no visible swelling appreciated\par CERVICAL ROM: Flexion, extension, side-bending, rotation, oblique extension all full and pain free.  \par SHOULDER ROM: Full and pain free bilaterally. mild tightness reported with ROM\par PALP: There is no tenderness over the midline spinous processes, paravertebral muscles, trapezius, levator scapulae or shoulder region bilaterally.  No axillary tenderness appreciated\par SPECIAL:  no axillary cording on exam\par \par \par \par

## 2021-08-27 NOTE — DISEASE MANAGEMENT
[Pathological] : TNM Stage: p [IIA] : IIA [TTNM] : 2 [NTNM] : 1mi [MTNM] : 0 [de-identified] : 5000cGy [de-identified] : 5000cGy [de-identified] : Right breast

## 2021-08-27 NOTE — PHYSICAL EXAM
[de-identified] : breasts surgically absent, bilateral expanders in place, right chest wall with mild erythema and hyperpigmentation.

## 2021-08-27 NOTE — PHYSICAL EXAM
[] : no respiratory distress [Edema] : no peripheral edema present [Nondistended] : nondistended [Normal] : well developed, well nourished, in no acute distress [de-identified] : right chest wall with mild erythema, no moist desquamation. Scars CDI without discharge or seroma. Bilateral implants in place without asymmetry.

## 2021-08-27 NOTE — HISTORY OF PRESENT ILLNESS
[FreeTextEntry1] : Ms. Lerman is a 45 year old woman with stage IIA, [id number](sn)M0 hormone receptor positive, HER2 negative invasive ductal carcinoma of the right breast. She underwent mastectomy with negative margins and had micrometastatic disease in 1 of 2 sentinel lymph nodes. She completed adjuvant chemotherapy with dose dense AC-T.  She is now receiving adjuvant post-mastectomy radiation therapy. Today is her last day of treatment. \par \par She has fatigue is on and off. She notes intermittent sharp pains in right chest wall, max 4/10, not requiring analgesics. The skin is mildly red and she continues skin care. She will be seeing Dr. Tello PM&R. \par \par

## 2021-08-27 NOTE — HISTORY OF PRESENT ILLNESS
[FreeTextEntry1] : Ms. Lerman is a 45 year old woman with stage IIA, [id number](sn)M0 hormone receptor positive, HER2 negative invasive ductal carcinoma of the right breast. She underwent mastectomy with negative margins and had micrometastatic disease in 1 of 2 sentinel lymph nodes. She completed adjuvant chemotherapy with dose dense AC-T.  She is now receiving adjuvant post-mastectomy radiation therapy. \par \par She is feeling generally well. She denies fatigue and pain. She has noted mild redness to the skin, no pain. She is using a natural moisturizer and also has aloe vera. \par \par

## 2021-08-27 NOTE — REVIEW OF SYSTEMS
[Alopecia: Grade 0] : Alopecia: Grade 0 [Pruritus: Grade 0] : Pruritus: Grade 0 [Skin Atrophy: Grade 0] : Skin Atrophy: Grade 0 [Skin Induration: Grade 0] : Skin Induration: Grade 0 [Dermatitis Radiation: Grade 1 - Faint erythema or dry desquamation] : Dermatitis Radiation: Grade 1 - Faint erythema or dry desquamation [Skin Hyperpigmentation: Grade 0] : Skin Hyperpigmentation: Grade 0

## 2021-08-27 NOTE — DISEASE MANAGEMENT
[TTNM] : 2 [NTNM] : 1mi [MTNM] : 0 [de-identified] : 0226cGy [de-identified] : 5000cGy [de-identified] : Right breast

## 2021-09-01 ENCOUNTER — NON-APPOINTMENT (OUTPATIENT)
Age: 46
End: 2021-09-01

## 2021-09-02 ENCOUNTER — NON-APPOINTMENT (OUTPATIENT)
Age: 46
End: 2021-09-02

## 2021-09-14 ENCOUNTER — OUTPATIENT (OUTPATIENT)
Dept: OUTPATIENT SERVICES | Facility: HOSPITAL | Age: 46
LOS: 1 days | Discharge: HOME | End: 2021-09-14

## 2021-09-14 DIAGNOSIS — Z98.890 OTHER SPECIFIED POSTPROCEDURAL STATES: Chronic | ICD-10-CM

## 2021-09-14 DIAGNOSIS — Z85.3 PERSONAL HISTORY OF MALIGNANT NEOPLASM OF BREAST: ICD-10-CM

## 2021-09-14 DIAGNOSIS — Z90.49 ACQUIRED ABSENCE OF OTHER SPECIFIED PARTS OF DIGESTIVE TRACT: Chronic | ICD-10-CM

## 2021-09-15 ENCOUNTER — APPOINTMENT (OUTPATIENT)
Dept: HEMATOLOGY ONCOLOGY | Facility: CLINIC | Age: 46
End: 2021-09-15
Payer: COMMERCIAL

## 2021-09-15 PROCEDURE — 99443: CPT

## 2021-09-15 NOTE — HISTORY OF PRESENT ILLNESS
[Home] : at home, [unfilled] , at the time of the visit. [Medical Office: ([facility])___] : at the medical office located in  [Spouse] : spouse [Verbal consent obtained from patient] : the patient, [unfilled] [Disease: _____________________] : Disease: [unfilled] [T: ___] : T[unfilled] [N: ___] : N[unfilled] [M: ___] : M[unfilled] [AJCC Stage: ____] : AJCC Stage: [unfilled] [de-identified] : The patient has h/o chronic "cystic breasts."  \par \par Her history of present illness began with a breast self-examination in approximately the third week of 11/2020 when she noticed a palpable mass in the upper right breast just above the nipple area.  She called her gynecologist and ultimately scheduled a diagnostic mammogram on 11/25/2020 with the finding of a spiculated mass in the supra-areolar region of the right breast, mid level to the chest wall, with suspicious microcalcifications identified, extending for approximately 4 cm with a suggestion of retraction with extension to the anterior surface of the breast approximately 2 cm inferior to the mass, suspicious microcalcifications identified within the mass; there were scattered groups of calcifications also identified in the left breast with questionable areas of nodularity.  A bilateral breast ultrasound was performed on that same date with a finding of a 4 cm spiculated mass in the right breast, 12 o'clock axis, 1 cm from the nipple, with a note that although other irregular areas of nodularity were present, tissue sampling of this mass was advised under ultrasound-guided core biopsy; the left breast had a 3 mm slightly irregular hypoechoic nodule at the 4 o'clock axis, 6 cm from the nipple with no area of suspicious architectural distortion or acoustical shadowing; no abnormal adenopathy was seen in either axilla.  On 12/02/2020, the patient underwent a right breast 12 o'clock axis core biopsy with a finding of moderately differentiated invasive duct carcinoma with focal squamous differentiation, with core biopsy specimen measuring 1.2 cm, estrogen receptor positive (80%), progesterone receptor positive (30%), and HER-2/lou negative (1+ staining).  She subsequently saw Dr. Flor Alfonso who then ordered a bilateral breast MRI which was performed on 12/15/2020 with a finding of a spiculated enhancing mass in the right upper central breast, middle depth which measured at least 3 x 4 cm corresponding to the biopsy-proven malignancy; there was nodule enhancement extending inferior, medial, and superior-lateral to the mass, which most likely represented a hematoma from the recent biopsy although satellite lesions could not be excluded; no evidence of contralateral breast disease was noted.  \par \par The patient ultimately proceeded on to a bilateral mastectomy, with the right mastectomy specimen showing 2 foci of invasive duct carcinoma, poorly differentiated, spanning 35 and 14 mm, respectively (the large focus demonstrated focal squamous differentiation), Mia-SBR score 9, margins negative, with non-extensive DCIS noted, evidence of lymphovascular invasion was identified, and 1/1 sentinel lymph node with a micrometastasis (1.6 mm), and 1/1 sentinel lymph node returning negative for metastasis.\par \par The patient had germline cancer genetic testing with a BRCA1/2 analyses with [employer] RNA Insight testing which returned with no pathogenic mutation.\par \par The patient was seen by Dr. Gina Jacob in consultation who recommended adjuvant dose-dense ACT per patient's verbal report; I do not have a copy of those reports for my review.\par \par She saw me in initial consultation on 2/2/21. I had an extensive discussion with the patient regarding her recently diagnosed T2 N1mi M0, stage IIA breast cancer, noting the potential implications of her pathologic prognostic factors on the subsequent risk of developing local and metastatic recurrence.  In light of the above, I recommended adjuvant antiestrogen therapy, with tamoxifen should she be chemically premenopausal versus an aromatase inhibitor such as anastrozole if she is chemically menopausal, noting the potential risks, benefits, anticipated side effects, as well as potential reduction of these two approaches regarding the risk of developing metastatic recurrence.  She has been amenorrheic for 13 months, and I have recommended she have an FSH, LH, and estradiol level to better determine whether she is fully postmenopausal or perimenopausal at this time prior to making definitive treatment recommendations regarding antiestrogen therapy.  In addition, I have recommended that she allow us to send her tumor off for Oncotype DX testing, noting the implications of this molecular profiling tool on the subsequent decision-making as to whether to add adjuvant chemotherapy to adjuvant antiestrogen therapy.  Should this return with a low-risk score, I noted that I would recommend only antiestrogen therapy.  Should it return with a high-risk score, I would recommend adding adjuvant chemotherapy, specifically preliminarily discussing adjuvant TC chemotherapy every 3 weeks for 4 cycles with Onpro for hematologic support noting the potential risks, benefits, and anticipated side effects.  The option of using scalp cooling technologies to diminish or avoid chemotherapy-induced alopecia was also preliminarily discussed.  The patient has asked me why I did not recommend dose-dense ACT chemotherapy as Dr. Jacob had done, and I noted that it is a fine regimen, for which there is strong evidence for potential benefit in women with triple negative breast cancer, and those with lymph node positive breast cancer.  As a matter fact, from the recent TAILORx study, there is a minimal difference between TC and dose-dense ACT. \par In the interim The OncotypeDX returned with a RS 31 = 24% ROR at 9  years with GUTIERREZ or AI alone and a group average absolute chemotherapy benefit of ~ 15%. \par She wished to proceed with DD AC>>T. STarted on 3/3/21\par  [de-identified] : ER+ NE+ her 2 lou - [de-identified] : S/P DD ACT 6/9/21\par \par S/P adjuvant XRT 08/24/2021\par Body Area Treated Response: Right breast\par Radiation Dose: Response: >=30Gy\par \par Repost she tolerated it very well. \par \par Telephonic visit today to re-discuss adjuvant anastrozole, Pt is worried about the side effects she has learned about online and especially "losing my vision". Has not starte dit.\par \par She has lost >20 lbs since chemo but this has now stabilized w/o further wt loss reported after chemo over. She has been working with a nutritionist to increase her calories as she has many food choices / aversion secondary to her irritable bowel.

## 2021-09-27 ENCOUNTER — APPOINTMENT (OUTPATIENT)
Dept: RADIATION ONCOLOGY | Facility: CLINIC | Age: 46
End: 2021-09-27
Payer: COMMERCIAL

## 2021-09-27 PROCEDURE — 99024 POSTOP FOLLOW-UP VISIT: CPT | Mod: GC

## 2021-09-29 LAB
ALBUMIN SERPL ELPH-MCNC: 4.4 G/DL
ALP BLD-CCNC: 66 U/L
ALT SERPL-CCNC: 15 U/L
ANION GAP SERPL CALC-SCNC: 13 MMOL/L
AST SERPL-CCNC: 23 U/L
BASOPHILS # BLD AUTO: 0.03 K/UL
BASOPHILS NFR BLD AUTO: 0.7 %
BILIRUB SERPL-MCNC: 0.3 MG/DL
BUN SERPL-MCNC: 10 MG/DL
CALCIUM SERPL-MCNC: 9.9 MG/DL
CHLORIDE SERPL-SCNC: 101 MMOL/L
CO2 SERPL-SCNC: 24 MMOL/L
CREAT SERPL-MCNC: 0.61 MG/DL
EOSINOPHIL # BLD AUTO: 0.25 K/UL
EOSINOPHIL NFR BLD AUTO: 5.8 %
GLUCOSE SERPL-MCNC: 72 MG/DL
HCT VFR BLD CALC: 41.4 %
HGB BLD-MCNC: 13.2 G/DL
IMM GRANULOCYTES NFR BLD AUTO: 0.2 %
LYMPHOCYTES # BLD AUTO: 0.67 K/UL
LYMPHOCYTES NFR BLD AUTO: 15.6 %
MAN DIFF?: NORMAL
MCHC RBC-ENTMCNC: 29.3 PG
MCHC RBC-ENTMCNC: 31.9 GM/DL
MCV RBC AUTO: 92 FL
MONOCYTES # BLD AUTO: 0.57 K/UL
MONOCYTES NFR BLD AUTO: 13.3 %
NEUTROPHILS # BLD AUTO: 2.76 K/UL
NEUTROPHILS NFR BLD AUTO: 64.4 %
PLATELET # BLD AUTO: 154 K/UL
POTASSIUM SERPL-SCNC: 4.5 MMOL/L
PROT SERPL-MCNC: 7.2 G/DL
RBC # BLD: 4.5 M/UL
RBC # FLD: 13.2 %
SODIUM SERPL-SCNC: 139 MMOL/L
TSH SERPL-ACNC: 3 UIU/ML
WBC # FLD AUTO: 4.29 K/UL

## 2021-09-30 ENCOUNTER — APPOINTMENT (OUTPATIENT)
Dept: RADIOLOGY | Facility: CLINIC | Age: 46
End: 2021-09-30
Payer: COMMERCIAL

## 2021-09-30 PROCEDURE — 77085 DXA BONE DENSITY AXL VRT FX: CPT

## 2021-09-30 NOTE — PHYSICAL EXAM
[] : no respiratory distress [No UE Edema] : there is no upper extremity edema [Thin] : thin [Sclera] : the sclera and conjunctiva were normal [Heart Rate And Rhythm] : heart rate and rhythm were normal [Abdomen Soft] : soft [Nondistended] : nondistended [Supraclavicular Lymph Nodes Enlarged Bilaterally] : supraclavicular [Axillary Lymph Nodes Enlarged Bilaterally] : axillary [Normal] : no focal deficits [de-identified] : Bilateral implants in place without asymmetry.  Scars intact.  no appreciable skin changes.

## 2021-09-30 NOTE — HISTORY OF PRESENT ILLNESS
[FreeTextEntry1] : Ms. Lerman is a 46 year old woman with stage IIA, [id number](sn)M0 hormone receptor positive, HER2 negative invasive ductal carcinoma of the right breast. She underwent mastectomy with negative margins and had micrometastatic disease in 1 of 2 sentinel lymph nodes. She completed adjuvant chemotherapy with dose dense AC-T.  She has now completed adjuvant post-mastectomy radiation therapy, a dose of 5,000 cGy to the right chest wall, completed on 8/24/2021.\par \par She comes today for routine follow up. The patient reports feeling generally well.  She is getting back to her usual activities.  She has a good appetite and denies dysphagia. She denies cough or shortness of breath.  There has been some improvement in the skin since completing radiation therapy. She plans to start AI, but hesitant to start due to concerns about osteoporosis.  She will have baseline dexa this week. She is concerned about weight loss and continues fatigue. She had COVID vaccine, last moderna shot in April. \par \par \par \par

## 2021-10-13 NOTE — ASU PREOP CHECKLIST - WAS PATIENT ON BETA BLOCKER?
RN relayed information to patient's mother. Mom is going to see if [facility] has the ability to obtain prescription for patient. Mom will call clinic if she has further issues in obtaining the prescription.   
No

## 2021-10-18 ENCOUNTER — OUTPATIENT (OUTPATIENT)
Dept: OUTPATIENT SERVICES | Facility: HOSPITAL | Age: 46
LOS: 1 days | Discharge: HOME | End: 2021-10-18

## 2021-10-18 DIAGNOSIS — Z98.890 OTHER SPECIFIED POSTPROCEDURAL STATES: Chronic | ICD-10-CM

## 2021-10-18 DIAGNOSIS — Z90.49 ACQUIRED ABSENCE OF OTHER SPECIFIED PARTS OF DIGESTIVE TRACT: Chronic | ICD-10-CM

## 2021-10-18 DIAGNOSIS — Z85.3 PERSONAL HISTORY OF MALIGNANT NEOPLASM OF BREAST: ICD-10-CM

## 2021-10-19 ENCOUNTER — APPOINTMENT (OUTPATIENT)
Dept: HEMATOLOGY ONCOLOGY | Facility: CLINIC | Age: 46
End: 2021-10-19
Payer: COMMERCIAL

## 2021-10-19 ENCOUNTER — NON-APPOINTMENT (OUTPATIENT)
Age: 46
End: 2021-10-19

## 2021-10-19 PROCEDURE — 99443: CPT

## 2021-10-20 ENCOUNTER — APPOINTMENT (OUTPATIENT)
Dept: ENDOCRINOLOGY | Facility: CLINIC | Age: 46
End: 2021-10-20
Payer: COMMERCIAL

## 2021-10-20 VITALS
OXYGEN SATURATION: 96 % | SYSTOLIC BLOOD PRESSURE: 105 MMHG | BODY MASS INDEX: 17.15 KG/M2 | HEIGHT: 66.57 IN | DIASTOLIC BLOOD PRESSURE: 70 MMHG | WEIGHT: 108 LBS | HEART RATE: 96 BPM

## 2021-10-20 DIAGNOSIS — Z82.49 FAMILY HISTORY OF ISCHEMIC HEART DISEASE AND OTHER DISEASES OF THE CIRCULATORY SYSTEM: ICD-10-CM

## 2021-10-20 PROCEDURE — 99205 OFFICE O/P NEW HI 60 MIN: CPT

## 2021-10-20 RX ORDER — ZOLPIDEM TARTRATE 6.25 MG/1
6.25 TABLET, EXTENDED RELEASE ORAL
Qty: 30 | Refills: 0 | Status: DISCONTINUED | COMMUNITY
Start: 2021-05-20 | End: 2021-10-20

## 2021-10-20 NOTE — HISTORY OF PRESENT ILLNESS
[Vitamin D (oral)] : Vitamin D orally [None] : The patient is not currently taking any medication for this problem. [Regular Dental Follow-Up] : regular dental follow-up [Family History of Osteoporosis] : family history of osteoporosis [History of Radiation Therapy] : history of radiation therapy [FreeTextEntry1] : NAVAH LERMAN  is a 47 yo female with past medical history of stage IIA right breast ductal carcinoma s/p right mastectomy s/p chemo and RT, and anxiety who presents to clinic today as referred by heme/onc for management of osteoporosis. \par \par Patient states she was diagnosed with breast cancer about a year ago, around Dec 2020 and has since gotten double mastectomy and underwent 4 cycles of chemotherapy and s/p RT. She was last seen by heme/onc in Sept 2021 at which time it was noted she was supposed to start on aromatase inhibitor therapy however patient was reportedly hesitant dye to concerns about osteoporosis. Patient today states that she would like to research her options for therapy before next appointment and wanted to come today to discuss osteoporosis as she had recent DXA scan that detected it.  She confirms she has not taken anastrazole yet. She notes she was on dexamethasone therapy in preparation of chemo (5 tablets (4mg tablet each) at bedtime before chemo and 5 tablets (4mg each) on morning of chemo. Since 6/9/21 she has not had any further chemo nor use of dexamethasone or any other steroid. \par She states she had no h/o prior bone disease. She notes she had only 1 fracture of left 5th phalanx about 4 years ago due to injury but no fragility fractures noted. She has no parental h/o hip fracture however she notes maternal grandmother had osteoporosis and her mother had a shoulder fracture but no osteoporosis. She notes she lost about 20lbs due to chemotherapy in past year.\par She never smoked cigarettes, does not drink excessive alcohol, no h/o rheumatoid arthritis. She is in menopause since past year (previously her cycles were regular.) She also denies h/o malabsorption, chronic liver disease, or inflammatory bowel disease. \par \par Patient notes hot flashes since menopause, denies dyspnea, dysphagia, dysphonia, changes in bowel habits including diarrhea or constipation. She also denies tremors/shaking of extremities or insomnia. \par \par PMH: as above\par PSH: double mastectomy, cholecystectomy, uterine fibroids removed\par Family Hx:  mother- colon cancer, HTN, father- glaucoma, prediabetes\par Social Hx: denies ETOH, tobacco or illicit drug use. Single, employed part time. no children. \par ALL: amoxicillin (rxn: rash)\par Home Meds: xanax 0.5mg qhs prn, citalopram 10mg daily, probiotics, vitamin D 1000IU po daily. [Low Calcium Intake] : no low calcium intake [Low Vit D Intake/Exposure] : no low vitamin D intake [Premat. Menopause (natural)] : no history of premature menopause [Premat. Menopause (surgery)] : no history of premature surgical menopause [Amenorrhea] : no amenorrhea [Disordered Eating] : no history of disordered eating [Taking Steroids] : no history of taking steroids [Hyperparathyroidism] : no history of hyperparathyroidism [Diabetes] : no history of diabetes [Testosterone Deficiency] : no testosterone deficiency [Kidney Stones] : no history of kidney stones [Excessive Alcohol Intake] : no excessive alcohol intake [Excessive Soda] : no excessive soda [Sedentary] : no sedentary lifestyle [Current Smoking___(ppd)] : not currently smoking [Previous Fragility Fracture] : no previous fragility fracture [Family History of Hip Fracture] : no family history of hip fracture [History of Blood Clots] : no history of blood clots [High Fall Risk] : no fall risk [Frequent Falls] : no frequent falls [Uses a Walker/Cane] : no use of a walker/cane [Inability to Stand Straight] : no inability to stand straight [Loss of Height] : no loss of height [Loss of Balance] : no loss of balance [Change in Clothing Fit] : no change in clothing fit [Weight Gain] : no weight gain [Difficulty Ambulating] : no difficulty ambulating [Hip Pain] : no hip pain [Wrist Pain] : no wrist pain [Difficulty with Stairs] : no difficulty with stairs [Arthralgias] : no arthralgias [Myalgias] : no myalgias [New Fracture] : no new fracture

## 2021-10-20 NOTE — ASSESSMENT
[FreeTextEntry1] : 1. osteoporosis\par h/o stage IIa breast cancer s/p chemo and RT, was planned for anastrozole as per heme/onc, however has not yet started aromatase inhibitor therapy\par h/o glucocorticoid use in past due to chemotherapy sessions \par Will pursue workup for secondary causes of osteoporosis \par Reviewed recent bloodwork done at outside lab- TSH normal, FSH, LH and estradiol consistent with menopause\par Will obtain vitamin D 25 hydroxy, PTH, tissue transglutaminase Ab, CTX, bone specific ALP, 24 hr urine cortisol, prolactin, SPEP and UPEP.\par Extensive discussion regarding increased fracture risk from high dose and chronic use of glucocorticoids \par Also discussed with patient importance of weightbearing exercises to prevent both bone loss and muscle atrophy, to avoid smoking and excess alcohol, and fall prevention was also discussed.\par Discussed indications, benefits and side effects of bisphosphonates and denosumab for treatment of osteoporosis, even if patient should opt for aromatase inhibitor therapy. Patient verbalized understanding and would like to research further. \par \par RTC in 1 month.

## 2021-10-20 NOTE — RESULTS/DATA
[Unknown] : unknown [L1 - L4] : L1 - L4 [T-Score ___] : T-score: [unfilled] [FreeTextEntry2] : 9/30/2021 [de-identified] : -3.1 [de-identified] : -2.9 [de-identified] : -3.0

## 2021-10-20 NOTE — REVIEW OF SYSTEMS
[As Noted in HPI] : as noted in HPI [Fatigue] : no fatigue [Decreased Appetite] : appetite not decreased [Fever] : no fever [Chills] : no chills [Nausea] : no nausea [Constipation] : no constipation [Abdominal Pain] : no abdominal pain [Vomiting] : no vomiting [Diarrhea] : no diarrhea [Gas/Bloating] : no gas/bloating [Joint Pain] : no joint pain [Muscle Weakness] : no muscle weakness [Myalgia] : no myalgia  [Joint Stiffness] : no joint stiffness

## 2021-10-20 NOTE — PHYSICAL EXAM
[Alert] : alert [No Acute Distress] : no acute distress [Well Developed] : well developed [Normal Sclera/Conjunctiva] : normal sclera/conjunctiva [EOMI] : extra ocular movement intact [No Proptosis] : no proptosis [No Lid Lag] : no lid lag [No Neck Mass] : no neck mass was observed [No LAD] : no lymphadenopathy [Supple] : the neck was supple [Thyroid Not Enlarged] : the thyroid was not enlarged [No Thyroid Nodules] : no palpable thyroid nodules [No Respiratory Distress] : no respiratory distress [Clear to Auscultation] : lungs were clear to auscultation bilaterally [Normal S1, S2] : normal S1 and S2 [No Murmurs] : no murmurs [Normal Rate] : heart rate was normal [Regular Rhythm] : with a regular rhythm [Normal Bowel Sounds] : normal bowel sounds [Not Tender] : non-tender [Not Distended] : not distended [Soft] : abdomen soft [Normal Supraclavicular Nodes] : no supraclavicular lymphadenopathy [No Stigmata of Cushings Syndrome] : no stigmata of Cushings Syndrome [Normal Gait] : normal gait [No Clubbing, Cyanosis] : no clubbing  or cyanosis of the fingernails [No Involuntary Movements] : no involuntary movements were seen [No Joint Swelling] : no joint swelling seen [No Rash] : no rash [Oriented x3] : oriented to person, place, and time [Acanthosis Nigricans] : no acanthosis nigricans [Hirsutism] : no hirsutism

## 2021-10-20 NOTE — RESULTS/DATA
[Unknown] : unknown [L1 - L4] : L1 - L4 [T-Score ___] : T-score: [unfilled] [FreeTextEntry2] : 9/30/2021 [de-identified] : -3.1 [de-identified] : -2.9 [de-identified] : -3.0

## 2021-10-20 NOTE — HISTORY OF PRESENT ILLNESS
[Vitamin D (oral)] : Vitamin D orally [None] : The patient is not currently taking any medication for this problem. [Regular Dental Follow-Up] : regular dental follow-up [Family History of Osteoporosis] : family history of osteoporosis [History of Radiation Therapy] : history of radiation therapy [FreeTextEntry1] : NAVAH LERMAN  is a 45 yo female with past medical history of stage IIA right breast ductal carcinoma s/p right mastectomy s/p chemo and RT, and anxiety who presents to clinic today as referred by heme/onc for management of osteoporosis. \par \par Patient states she was diagnosed with breast cancer about a year ago, around Dec 2020 and has since gotten double mastectomy and underwent 4 cycles of chemotherapy and s/p RT. She was last seen by heme/onc in Sept 2021 at which time it was noted she was supposed to start on aromatase inhibitor therapy however patient was reportedly hesitant dye to concerns about osteoporosis. Patient today states that she would like to research her options for therapy before next appointment and wanted to come today to discuss osteoporosis as she had recent DXA scan that detected it.  She confirms she has not taken anastrazole yet. She notes she was on dexamethasone therapy in preparation of chemo (5 tablets (4mg tablet each) at bedtime before chemo and 5 tablets (4mg each) on morning of chemo. Since 6/9/21 she has not had any further chemo nor use of dexamethasone or any other steroid. \par She states she had no h/o prior bone disease. She notes she had only 1 fracture of left 5th phalanx about 4 years ago due to injury but no fragility fractures noted. She has no parental h/o hip fracture however she notes maternal grandmother had osteoporosis and her mother had a shoulder fracture but no osteoporosis. She notes she lost about 20lbs due to chemotherapy in past year.\par She never smoked cigarettes, does not drink excessive alcohol, no h/o rheumatoid arthritis. She is in menopause since past year (previously her cycles were regular.) She also denies h/o malabsorption, chronic liver disease, or inflammatory bowel disease. \par \par Patient notes hot flashes since menopause, denies dyspnea, dysphagia, dysphonia, changes in bowel habits including diarrhea or constipation. She also denies tremors/shaking of extremities or insomnia. \par \par PMH: as above\par PSH: double mastectomy, cholecystectomy, uterine fibroids removed\par Family Hx:  mother- colon cancer, HTN, father- glaucoma, prediabetes\par Social Hx: denies ETOH, tobacco or illicit drug use. Single, employed part time. no children. \par ALL: amoxicillin (rxn: rash)\par Home Meds: xanax 0.5mg qhs prn, citalopram 10mg daily, probiotics, vitamin D 1000IU po daily. [Low Calcium Intake] : no low calcium intake [Low Vit D Intake/Exposure] : no low vitamin D intake [Premat. Menopause (natural)] : no history of premature menopause [Premat. Menopause (surgery)] : no history of premature surgical menopause [Amenorrhea] : no amenorrhea [Disordered Eating] : no history of disordered eating [Taking Steroids] : no history of taking steroids [Hyperparathyroidism] : no history of hyperparathyroidism [Diabetes] : no history of diabetes [Testosterone Deficiency] : no testosterone deficiency [Kidney Stones] : no history of kidney stones [Excessive Alcohol Intake] : no excessive alcohol intake [Excessive Soda] : no excessive soda [Sedentary] : no sedentary lifestyle [Current Smoking___(ppd)] : not currently smoking [Previous Fragility Fracture] : no previous fragility fracture [Family History of Hip Fracture] : no family history of hip fracture [History of Blood Clots] : no history of blood clots [High Fall Risk] : no fall risk [Frequent Falls] : no frequent falls [Uses a Walker/Cane] : no use of a walker/cane [Inability to Stand Straight] : no inability to stand straight [Loss of Height] : no loss of height [Loss of Balance] : no loss of balance [Change in Clothing Fit] : no change in clothing fit [Weight Gain] : no weight gain [Difficulty Ambulating] : no difficulty ambulating [Hip Pain] : no hip pain [Wrist Pain] : no wrist pain [Difficulty with Stairs] : no difficulty with stairs [Arthralgias] : no arthralgias [Myalgias] : no myalgias [New Fracture] : no new fracture

## 2021-10-21 NOTE — HISTORY OF PRESENT ILLNESS
[Home] : at home, [unfilled] , at the time of the visit. [Medical Office: ([facility])___] : at the medical office located in  [Spouse] : spouse [Verbal consent obtained from patient] : the patient, [unfilled] [Disease: _____________________] : Disease: [unfilled] [T: ___] : T[unfilled] [N: ___] : N[unfilled] [M: ___] : M[unfilled] [AJCC Stage: ____] : AJCC Stage: [unfilled] [de-identified] : The patient has h/o chronic "cystic breasts."  \par \par Her history of present illness began with a breast self-examination in approximately the third week of 11/2020 when she noticed a palpable mass in the upper right breast just above the nipple area.  She called her gynecologist and ultimately scheduled a diagnostic mammogram on 11/25/2020 with the finding of a spiculated mass in the supra-areolar region of the right breast, mid level to the chest wall, with suspicious microcalcifications identified, extending for approximately 4 cm with a suggestion of retraction with extension to the anterior surface of the breast approximately 2 cm inferior to the mass, suspicious microcalcifications identified within the mass; there were scattered groups of calcifications also identified in the left breast with questionable areas of nodularity.  A bilateral breast ultrasound was performed on that same date with a finding of a 4 cm spiculated mass in the right breast, 12 o'clock axis, 1 cm from the nipple, with a note that although other irregular areas of nodularity were present, tissue sampling of this mass was advised under ultrasound-guided core biopsy; the left breast had a 3 mm slightly irregular hypoechoic nodule at the 4 o'clock axis, 6 cm from the nipple with no area of suspicious architectural distortion or acoustical shadowing; no abnormal adenopathy was seen in either axilla.  On 12/02/2020, the patient underwent a right breast 12 o'clock axis core biopsy with a finding of moderately differentiated invasive duct carcinoma with focal squamous differentiation, with core biopsy specimen measuring 1.2 cm, estrogen receptor positive (80%), progesterone receptor positive (30%), and HER-2/lou negative (1+ staining).  She subsequently saw Dr. Flor Alfonso who then ordered a bilateral breast MRI which was performed on 12/15/2020 with a finding of a spiculated enhancing mass in the right upper central breast, middle depth which measured at least 3 x 4 cm corresponding to the biopsy-proven malignancy; there was nodule enhancement extending inferior, medial, and superior-lateral to the mass, which most likely represented a hematoma from the recent biopsy although satellite lesions could not be excluded; no evidence of contralateral breast disease was noted.  \par \par The patient ultimately proceeded on to a bilateral mastectomy, with the right mastectomy specimen showing 2 foci of invasive duct carcinoma, poorly differentiated, spanning 35 and 14 mm, respectively (the large focus demonstrated focal squamous differentiation), Mia-SBR score 9, margins negative, with non-extensive DCIS noted, evidence of lymphovascular invasion was identified, and 1/1 sentinel lymph node with a micrometastasis (1.6 mm), and 1/1 sentinel lymph node returning negative for metastasis.\par \par The patient had germline cancer genetic testing with a BRCA1/2 analyses with [employer] Cancer Plus RNA Insight testing which returned with no pathogenic mutation.\par \par The patient was seen by Dr. Gina Jacob in consultation who recommended adjuvant dose-dense ACT per patient's verbal report; I do not have a copy of those reports for my review.\par \par She saw me in initial consultation on 2/2/21. I had an extensive discussion with the patient regarding her recently diagnosed T2 N1mi M0, stage IIA breast cancer, noting the potential implications of her pathologic prognostic factors on the subsequent risk of developing local and metastatic recurrence.  In light of the above, I recommended adjuvant antiestrogen therapy, with tamoxifen should she be chemically premenopausal versus an aromatase inhibitor such as anastrozole if she is chemically menopausal, noting the potential risks, benefits, anticipated side effects, as well as potential reduction of these two approaches regarding the risk of developing metastatic recurrence.  She has been amenorrheic for 13 months, and I have recommended she have an FSH, LH, and estradiol level to better determine whether she is fully postmenopausal or perimenopausal at this time prior to making definitive treatment recommendations regarding antiestrogen therapy.  In addition, I have recommended that she allow us to send her tumor off for Oncotype DX testing, noting the implications of this molecular profiling tool on the subsequent decision-making as to whether to add adjuvant chemotherapy to adjuvant antiestrogen therapy.  Should this return with a low-risk score, I noted that I would recommend only antiestrogen therapy.  Should it return with a high-risk score, I would recommend adding adjuvant chemotherapy, specifically preliminarily discussing adjuvant TC chemotherapy every 3 weeks for 4 cycles with Onpro for hematologic support noting the potential risks, benefits, and anticipated side effects.  The option of using scalp cooling technologies to diminish or avoid chemotherapy-induced alopecia was also preliminarily discussed.  The patient has asked me why I did not recommend dose-dense ACT chemotherapy as Dr. Jacob had done, and I noted that it is a fine regimen, for which there is strong evidence for potential benefit in women with triple negative breast cancer, and those with lymph node positive breast cancer.  As a matter fact, from the recent TAILORx study, there is a minimal difference between TC and dose-dense ACT. \par In the interim The OncotypeDX returned with a RS 31 = 24% ROR at 9  years with GUTIERREZ or AI alone and a group average absolute chemotherapy benefit of ~ 15%. \par She wished to proceed with DD AC>>T. STarted on 3/3/21\par \par S/P DD ACT 6/9/21\par \par S/P adjuvant XRT 08/24/2021\par Body Area Treated Response: Right breast\par Radiation Dose: Response: >=30Gy [de-identified] : ER+ OR+ her 2 lou - [de-identified] : Here to discuss further treatment with anti-estrogen therapy.\par \par Had a DEXA with osteoporosis - pt is very upset about that. Is scheduled to see an endocrinologist tomorrow. \par \par Pt remains worried about the side effects of anti-estrogen therapy  ie both cruz and AIs. I"I am more fearful of taking this pill than I was from surgery or chemo". \par \par  DEXA 9/21 - ostoporosis (T-3.1 spine, -3.0 fem neck, -2.9 total hip)

## 2021-10-26 ENCOUNTER — NON-APPOINTMENT (OUTPATIENT)
Age: 46
End: 2021-10-26

## 2021-10-28 LAB
25(OH)D3 SERPL-MCNC: 22.2 NG/ML
CALCIUM SERPL-MCNC: 9.7 MG/DL
PARATHYROID HORMONE INTACT: 45 PG/ML
PROLACTIN SERPL-MCNC: 10.1 NG/ML

## 2021-11-01 LAB
ALBUMIN MFR SERPL ELPH: 57.9 %
ALBUMIN SERPL-MCNC: 3.9 G/DL
ALBUMIN/GLOB SERPL: 1.3 RATIO
ALP BONE SERPL-MCNC: 11.9 UG/L
ALPHA1 GLOB MFR SERPL ELPH: 4.7 %
ALPHA1 GLOB SERPL ELPH-MCNC: 0.3 G/DL
ALPHA2 GLOB MFR SERPL ELPH: 10.6 %
ALPHA2 GLOB SERPL ELPH-MCNC: 0.7 G/DL
B-GLOBULIN MFR SERPL ELPH: 10.3 %
B-GLOBULIN SERPL ELPH-MCNC: 0.7 G/DL
GAMMA GLOB FLD ELPH-MCNC: 1.1 G/DL
GAMMA GLOB MFR SERPL ELPH: 16.5 %
INTERPRETATION SERPL IEP-IMP: NORMAL
PROT SERPL-MCNC: 6.8 G/DL
PROT SERPL-MCNC: 6.8 G/DL

## 2021-11-03 ENCOUNTER — NON-APPOINTMENT (OUTPATIENT)
Age: 46
End: 2021-11-03

## 2021-11-03 LAB — COLLAGEN NTX SER-SCNC: 18.9

## 2021-11-18 LAB
CREAT 24H UR-MCNC: 1.1 G/24 H
CREAT ?TM UR-MCNC: 44 MG/DL
PROT ?TM UR-MCNC: 24 HR
SPECIMEN VOL 24H UR: 2600 ML

## 2021-11-21 LAB
ALBUPE: 19.3 %
ALPHA1UPE: 25.7 %
ALPHA2UPE: 20.7 %
BENCE JONES EXCRETION: 0 MG/24HR
BETAUPE: 19.8 %
CREAT 24H UR-MCNC: 1.1 G/24 H
CREATININE UR (MAYO): 44 MG/DL
GAMMAUPE: 14.5 %
IGA 24H UR QL IFE: NORMAL
KAPPA LC 24H UR QL: 0 MG/DL
M PROTEIN 24H MFR UR ELPH: 0 %
PROT ?TM UR-MCNC: 24 HR
PROT PATTERN 24H UR ELPH-IMP: NORMAL
PROT UR-MCNC: 4 MG/DL
PROT UR-MCNC: 4 MG/DL
SPECIMEN VOL 24H UR: 2600 ML
U PROTEIN QNT CALCULATION: 104 MG/24 H

## 2021-11-22 ENCOUNTER — APPOINTMENT (OUTPATIENT)
Dept: PHYSICAL MEDICINE AND REHAB | Facility: CLINIC | Age: 46
End: 2021-11-22
Payer: COMMERCIAL

## 2021-11-22 VITALS
TEMPERATURE: 93.6 F | OXYGEN SATURATION: 99 % | SYSTOLIC BLOOD PRESSURE: 104 MMHG | DIASTOLIC BLOOD PRESSURE: 69 MMHG | HEART RATE: 89 BPM

## 2021-11-22 PROCEDURE — 93702 BIS XTRACELL FLUID ANALYSIS: CPT

## 2021-11-22 PROCEDURE — 99214 OFFICE O/P EST MOD 30 MIN: CPT | Mod: 25

## 2021-11-22 NOTE — HISTORY OF PRESENT ILLNESS
[FreeTextEntry1] : Navah Lerman is a 45 year old female with R breast ca s/p b/l mastectomy with micrometastatic disease in 1/2 sentinel LN, now s/p chemo and RT.  She attended lymphedema therapy for prevention but stopped as she was not having symptoms at the time. She is doing home exercises, stretching and weights. She was diagnosed with osteoporosis and is starting bisphosphonates.    \par She reports R axillary tightness which started in the past few weeks. She is doing light weight resistive exercises  since being diagnosed with osteoporosis and feels tightness worsened.  Interested in sozo measurement today. \par \par prediagnosis weight 128, 110 lb today. \par

## 2021-11-22 NOTE — ASSESSMENT
[FreeTextEntry1] : 46 year old female pmh R breast ca s/p b/l mastectomy, chemo and radiation\par -patient would like surveillance program to monitor for lymphedema. sozo measurement taken today.\par The patient had a baseline SOZO measurement, which I reviewed today. The score is -3.2. Bioimpedance spectroscopy helps identify the onset of lymphedema in an arm or leg before patients experience noticeable swelling. Research has shown that the early detection of lymphedema using L-Dex combined with treatment can reduce progression to chronic lymphedema by 95% in breast cancer patients. Whenever possible, patients are tested for baseline L-Dex score before cancer treatment begins and then are reassessed during regular follow-up visits using the SOZO device. Otherwise, this can be started postoperatively and continued during regular follow-up visits. If the patient’s L-Dex score increases above normal levels, that is a sign that lymphedema is developing, and a referral is made to physical therapy for further evaluation and/or early compression treatment. Lymphedema assessment with the SOZO L-Dex score is recommended to be done every 3 months for the first 3 years and then every 6 months for years 4 and 5, followed by annually afterwards.\par -arm measurements taken today, grossly equal b/l.\par -patient referred breast PT program for ROM and muscle tightness, as well as home exercise program, stretching, will reach out to physical therapist to find out if therapy can be restarted for R axillary tightness.  Will start with OT, can consider breast PT if not improving. \par -follow up in 6 weeks \par

## 2021-11-22 NOTE — PHYSICAL EXAM
[FreeTextEntry1] : GEN: AAOx3, NAD.\par PSYCH: Normal mood and affect. Responds appropriately to commands.\par EYES: Sclerae Anicteric. No discharge. EOMI.\par RESP: Breathing unlabored.\par CV: Radial pulses 2+ and equal. No varicosities noted.\par EXT: No C/C/E. \par RUE: 18.5cm  22.5 cm\par LUE: 18 cm 22.5 cm \par sozo measurement -3.2\par SKIN: No lesions noted. Incisions well healed, no erythema or swelling \par LYMPH: No supraclavicular, anterior or posterior cervical lymphadenopathy appreciated.\par GAIT: Non antalgic, Normal reciprocating heel to toe.\par STRENGTH: 5-/5 bilateral upper extremities\par SENSATION: Grossly intact to light touch bilateral upper extremities.\par INSP: no visible swelling appreciated\par CERVICAL ROM: Flexion, extension, side-bending, rotation, oblique extension all full and pain free. \par SHOULDER ROM: Full and pain free bilaterally. mild tightness reported with ROM\par PALP: There is no tenderness over the midline spinous processes, paravertebral muscles, trapezius, levator scapulae or shoulder region bilaterally. No axillary tenderness appreciated\par SPECIAL: no axillary cording on exam\par \par \par

## 2021-11-24 LAB
CORTIS 24H UR-MCNC: 24 H
CORTIS 24H UR-MRATE: 25 MCG/24 H
SPECIMEN VOL 24H UR: 2600 ML

## 2021-12-15 ENCOUNTER — NON-APPOINTMENT (OUTPATIENT)
Age: 46
End: 2021-12-15

## 2021-12-26 ENCOUNTER — OUTPATIENT (OUTPATIENT)
Dept: OUTPATIENT SERVICES | Facility: HOSPITAL | Age: 46
LOS: 1 days | Discharge: HOME | End: 2021-12-26
Payer: COMMERCIAL

## 2021-12-26 DIAGNOSIS — Z90.49 ACQUIRED ABSENCE OF OTHER SPECIFIED PARTS OF DIGESTIVE TRACT: Chronic | ICD-10-CM

## 2021-12-26 DIAGNOSIS — Z98.890 OTHER SPECIFIED POSTPROCEDURAL STATES: Chronic | ICD-10-CM

## 2021-12-26 DIAGNOSIS — Z85.3 PERSONAL HISTORY OF MALIGNANT NEOPLASM OF BREAST: ICD-10-CM

## 2021-12-29 ENCOUNTER — APPOINTMENT (OUTPATIENT)
Dept: CARDIOLOGY | Facility: CLINIC | Age: 46
End: 2021-12-29

## 2021-12-29 ENCOUNTER — APPOINTMENT (OUTPATIENT)
Dept: HEMATOLOGY ONCOLOGY | Facility: CLINIC | Age: 46
End: 2021-12-29
Payer: COMMERCIAL

## 2021-12-29 PROCEDURE — 99214 OFFICE O/P EST MOD 30 MIN: CPT | Mod: 95

## 2021-12-29 RX ORDER — LIDOCAINE AND PRILOCAINE 25; 25 MG/G; MG/G
2.5-2.5 CREAM TOPICAL
Qty: 2 | Refills: 2 | Status: DISCONTINUED | COMMUNITY
Start: 2021-03-03 | End: 2021-12-29

## 2021-12-29 RX ORDER — MELATONIN 3 MG
TABLET ORAL
Refills: 0 | Status: ACTIVE | COMMUNITY

## 2021-12-29 RX ORDER — ONDANSETRON 8 MG/1
8 TABLET ORAL EVERY 8 HOURS
Qty: 30 | Refills: 0 | Status: DISCONTINUED | COMMUNITY
Start: 2021-03-17 | End: 2021-12-29

## 2021-12-29 RX ORDER — PROCHLORPERAZINE MALEATE 10 MG/1
10 TABLET ORAL EVERY 6 HOURS
Qty: 30 | Refills: 3 | Status: DISCONTINUED | COMMUNITY
Start: 2021-03-03 | End: 2021-12-29

## 2021-12-29 RX ORDER — LORAZEPAM 0.5 MG/1
0.5 TABLET ORAL
Qty: 40 | Refills: 0 | Status: DISCONTINUED | COMMUNITY
Start: 2021-03-17 | End: 2021-12-29

## 2021-12-29 RX ORDER — ONDANSETRON 8 MG/1
8 TABLET, ORALLY DISINTEGRATING ORAL
Qty: 30 | Refills: 3 | Status: DISCONTINUED | COMMUNITY
Start: 2021-03-17 | End: 2021-12-29

## 2021-12-29 RX ORDER — DIPHENHYDRAMINE HYDROCHLORIDE AND LIDOCAINE HYDROCHLORIDE AND ALUMINUM HYDROXIDE AND MAGNESIUM HYDRO
KIT
Qty: 1 | Refills: 2 | Status: DISCONTINUED | COMMUNITY
Start: 2021-03-25 | End: 2021-12-29

## 2021-12-29 RX ORDER — DEXAMETHASONE 4 MG/1
4 TABLET ORAL
Qty: 40 | Refills: 0 | Status: DISCONTINUED | COMMUNITY
Start: 2021-04-14 | End: 2021-12-29

## 2021-12-29 NOTE — PHYSICAL EXAM
[Fully active, able to carry on all pre-disease performance without restriction] : Status 0 - Fully active, able to carry on all pre-disease performance without restriction [Normal] : affect appropriate [de-identified] : sclerae anicteric [de-identified] : no labored breathing

## 2021-12-29 NOTE — REVIEW OF SYSTEMS
[Negative] : Psychiatric [FreeTextEntry9] : as above [de-identified] : as above [de-identified] : as above

## 2021-12-29 NOTE — HISTORY OF PRESENT ILLNESS
[Disease: _____________________] : Disease: [unfilled] [T: ___] : T[unfilled] [N: ___] : N[unfilled] [M: ___] : M[unfilled] [AJCC Stage: ____] : AJCC Stage: [unfilled] [Home] : at home, [unfilled] , at the time of the visit. [Medical Office: ([facility])___] : at the medical office located in  [Spouse] : spouse [Verbal consent obtained from patient] : the patient, [unfilled] [de-identified] : The patient has h/o chronic "cystic breasts."  \par \par Her history of present illness began with a breast self-examination in approximately the third week of 11/2020 when she noticed a palpable mass in the upper right breast just above the nipple area.  She called her gynecologist and ultimately scheduled a diagnostic mammogram on 11/25/2020 with the finding of a spiculated mass in the supra-areolar region of the right breast, mid level to the chest wall, with suspicious microcalcifications identified, extending for approximately 4 cm with a suggestion of retraction with extension to the anterior surface of the breast approximately 2 cm inferior to the mass, suspicious microcalcifications identified within the mass; there were scattered groups of calcifications also identified in the left breast with questionable areas of nodularity.  A bilateral breast ultrasound was performed on that same date with a finding of a 4 cm spiculated mass in the right breast, 12 o'clock axis, 1 cm from the nipple, with a note that although other irregular areas of nodularity were present, tissue sampling of this mass was advised under ultrasound-guided core biopsy; the left breast had a 3 mm slightly irregular hypoechoic nodule at the 4 o'clock axis, 6 cm from the nipple with no area of suspicious architectural distortion or acoustical shadowing; no abnormal adenopathy was seen in either axilla.  On 12/02/2020, the patient underwent a right breast 12 o'clock axis core biopsy with a finding of moderately differentiated invasive duct carcinoma with focal squamous differentiation, with core biopsy specimen measuring 1.2 cm, estrogen receptor positive (80%), progesterone receptor positive (30%), and HER-2/lou negative (1+ staining).  She subsequently saw Dr. Flor Alfonso who then ordered a bilateral breast MRI which was performed on 12/15/2020 with a finding of a spiculated enhancing mass in the right upper central breast, middle depth which measured at least 3 x 4 cm corresponding to the biopsy-proven malignancy; there was nodule enhancement extending inferior, medial, and superior-lateral to the mass, which most likely represented a hematoma from the recent biopsy although satellite lesions could not be excluded; no evidence of contralateral breast disease was noted.  \par \par The patient ultimately proceeded on to a bilateral mastectomy, with the right mastectomy specimen showing 2 foci of invasive duct carcinoma, poorly differentiated, spanning 35 and 14 mm, respectively (the large focus demonstrated focal squamous differentiation), Mia-SBR score 9, margins negative, with non-extensive DCIS noted, evidence of lymphovascular invasion was identified, and 1/1 sentinel lymph node with a micrometastasis (1.6 mm), and 1/1 sentinel lymph node returning negative for metastasis.\par \par The patient had germline cancer genetic testing with a BRCA1/2 analyses with [employer] Next Cancer Plus RNA Insight testing which returned with no pathogenic mutation.\par \par The patient was seen by Dr. Gina Jacob in consultation who recommended adjuvant dose-dense ACT per patient's verbal report; I do not have a copy of those reports for my review.\par \par She saw me in initial consultation on 2/2/21. I had an extensive discussion with the patient regarding her recently diagnosed T2 N1mi M0, stage IIA breast cancer, noting the potential implications of her pathologic prognostic factors on the subsequent risk of developing local and metastatic recurrence.  In light of the above, I recommended adjuvant antiestrogen therapy, with tamoxifen should she be chemically premenopausal versus an aromatase inhibitor such as anastrozole if she is chemically menopausal, noting the potential risks, benefits, anticipated side effects, as well as potential reduction of these two approaches regarding the risk of developing metastatic recurrence.  She has been amenorrheic for 13 months, and I have recommended she have an FSH, LH, and estradiol level to better determine whether she is fully postmenopausal or perimenopausal at this time prior to making definitive treatment recommendations regarding antiestrogen therapy.  In addition, I have recommended that she allow us to send her tumor off for Oncotype DX testing, noting the implications of this molecular profiling tool on the subsequent decision-making as to whether to add adjuvant chemotherapy to adjuvant antiestrogen therapy.  Should this return with a low-risk score, I noted that I would recommend only antiestrogen therapy.  Should it return with a high-risk score, I would recommend adding adjuvant chemotherapy, specifically preliminarily discussing adjuvant TC chemotherapy every 3 weeks for 4 cycles with Onpro for hematologic support noting the potential risks, benefits, and anticipated side effects.  The option of using scalp cooling technologies to diminish or avoid chemotherapy-induced alopecia was also preliminarily discussed.  The patient has asked me why I did not recommend dose-dense ACT chemotherapy as Dr. Jacob had done, and I noted that it is a fine regimen, for which there is strong evidence for potential benefit in women with triple negative breast cancer, and those with lymph node positive breast cancer.  As a matter fact, from the recent TAILORx study, there is a minimal difference between TC and dose-dense ACT. \par \par The OncotypeDX returned with a RS 31 = 24% ROR at 9  years with GUTIERREZ or AI alone and a group average absolute chemotherapy benefit of ~ 15%. \par She wished to proceed with DD AC>>T. STarted on 3/3/21\par \par S/P DD ACT 6/9/21\par \par S/P adjuvant XRT 08/24/2021\par Body Area Treated Response: Right breast\par Radiation Dose: Response: >=30Gy [de-identified] : ER+ OK+ her 2 lou - [de-identified] : Seen for routine f/u. \par \par Had a DEXA with osteoporosis - was very upset - saw an endocrinologist and found to have low Vit D and started on vit D supplem as well as alendronate on 12/22/21.\par \par She also started anastrozole on 11/3021.\par \par Notes prior HFs that "perhaps" have increased somewhat on anastrozole but easily tolerated for now.  Notes base on thumbs violet johnson in [place]iel epositions during th eday - unclear if related to anastrozole or not - easily tolerable. \par \par DEXA 9/21 - ostoporosis (T-3.1 spine, -3.0 fem neck, -2.9 total hip)

## 2022-01-03 ENCOUNTER — APPOINTMENT (OUTPATIENT)
Dept: CARDIOLOGY | Facility: CLINIC | Age: 47
End: 2022-01-03
Payer: COMMERCIAL

## 2022-01-03 VITALS
WEIGHT: 112.63 LBS | HEIGHT: 65.5 IN | OXYGEN SATURATION: 98 % | RESPIRATION RATE: 16 BRPM | SYSTOLIC BLOOD PRESSURE: 103 MMHG | DIASTOLIC BLOOD PRESSURE: 69 MMHG | TEMPERATURE: 97.9 F | BODY MASS INDEX: 18.54 KG/M2 | HEART RATE: 76 BPM

## 2022-01-03 PROCEDURE — 93010 ELECTROCARDIOGRAM REPORT: CPT

## 2022-01-03 PROCEDURE — 93000 ELECTROCARDIOGRAM COMPLETE: CPT

## 2022-01-03 PROCEDURE — 99215 OFFICE O/P EST HI 40 MIN: CPT

## 2022-01-03 NOTE — PHYSICAL EXAM
[Well Developed] : well developed [Well Nourished] : well nourished [No Acute Distress] : no acute distress [Normal Conjunctiva] : normal conjunctiva [Normal Venous Pressure] : normal venous pressure [No Carotid Bruit] : no carotid bruit [Normal S1, S2] : normal S1, S2 [No Murmur] : no murmur [No Rub] : no rub [No Gallop] : no gallop [Clear Lung Fields] : clear lung fields [Good Air Entry] : good air entry [No Respiratory Distress] : no respiratory distress  [Soft] : abdomen soft [Normal Gait] : normal gait [Gait - Sufficient for Exercise Testing] : gait - sufficient for exercise testing [No Edema] : no edema [No Cyanosis] : no cyanosis [No Clubbing] : no clubbing [No Varicosities] : no varicosities [No Rash] : no rash [Moves all extremities] : moves all extremities [No Focal Deficits] : no focal deficits [Normal Speech] : normal speech [Alert and Oriented] : alert and oriented [Normal memory] : normal memory

## 2022-01-03 NOTE — HISTORY OF PRESENT ILLNESS
[FreeTextEntry1] : NAVAH LERMAN is a 46 year woman with a history of IBS and right sided HER2 negative breast cancer s/p ACT (3/2021-6/2021) and right sided radiation who comes today for cardio-oncology evaluation.\par \par She denies any history of heart disease, HTN, DM, HLD.\par \par She reports increased fatigue with exertion following chemotherapy. She feels winded after climbing 3 flights of stairs at home. She also feels tired after cleaning. She denies any exertional chest pain. Symptoms began following chemotherapy, but seem perhaps better since then. Baseline (pre-treatment) echo was normal (normal LVEF and GLS).\par \par Current medications include escitalopram for anxiety, aromatase inhibitor, alprazolam PRN, and alendronate.\par

## 2022-01-03 NOTE — ASSESSMENT
[FreeTextEntry1] : 46 year old woman with history of anthracycline chemotherapy (~ 240 mg/m2) and right sided radiation, no other cardiac risk factors, seen for cardio-oncology evaluation.\par \par Will repeat echo with GLS now given prior anthracyclines and exertional dyspnea.\par \par Dyspnea on exertion seems most likely related to deconditioning. Discussed reduced cardiorespiratory fitness in breast cancer survivors and strategies for improving conditioning, including importance of a regular aerobic exercise program. Discussed importance of ASCVD risk factor optimization.\par \par Will repeat echo now and follow up by phone after.\par \par Follow up with me in 3 months and can repeat lipids, A1c, at that time.\par \par Total Time Spent in face-to-face encounter was 45 minutes. >50% time spent in counseling and coordination of care and on addressing above medical conditions in assessment.\par All labs, imaging, consulting reports, and any relevant outside records including laboratory work personally reviewed in order to evaluate, manage, and coordinate care amongst providers.\par Discussed risk factor reduction and lifestyle modification.

## 2022-01-03 NOTE — CARDIOLOGY SUMMARY
[de-identified] : 1/3/2022: NSR and normal [de-identified] : 2/25/2021: LVEF 59.7 % and GLS - 22 (GE)

## 2022-01-05 ENCOUNTER — NON-APPOINTMENT (OUTPATIENT)
Age: 47
End: 2022-01-05

## 2022-01-06 ENCOUNTER — APPOINTMENT (OUTPATIENT)
Dept: RADIATION ONCOLOGY | Facility: CLINIC | Age: 47
End: 2022-01-06
Payer: COMMERCIAL

## 2022-01-10 ENCOUNTER — APPOINTMENT (OUTPATIENT)
Dept: RADIATION ONCOLOGY | Facility: CLINIC | Age: 47
End: 2022-01-10
Payer: COMMERCIAL

## 2022-01-10 VITALS
OXYGEN SATURATION: 97 % | TEMPERATURE: 98.42 F | DIASTOLIC BLOOD PRESSURE: 57 MMHG | HEART RATE: 79 BPM | BODY MASS INDEX: 18.46 KG/M2 | WEIGHT: 112.63 LBS | SYSTOLIC BLOOD PRESSURE: 84 MMHG | RESPIRATION RATE: 16 BRPM

## 2022-01-10 PROCEDURE — 99212 OFFICE O/P EST SF 10 MIN: CPT

## 2022-01-12 ENCOUNTER — APPOINTMENT (OUTPATIENT)
Dept: CV DIAGNOSITCS | Facility: HOSPITAL | Age: 47
End: 2022-01-12

## 2022-01-19 NOTE — VITALS
[Maximal Pain Intensity: 4/10] : 4/10 [Least Pain Intensity: 0/10] : 0/10 [Pain Location: ___] : Pain Location: [unfilled] [Pain Interferes with ADLs] : Pain does not interfere with activities of daily living

## 2022-01-19 NOTE — PHYSICAL EXAM
[] : no respiratory distress [Sclera] : the sclera and conjunctiva were normal [Heart Rate And Rhythm] : heart rate and rhythm were normal [Abdomen Soft] : soft [Nondistended] : nondistended [Normal] : oriented to person, place and time, the affect was normal, the mood was normal and not anxious [de-identified] : Bilateral implants in place without asymmetry. Scars intact. no appreciable skin changes. Tenderness along lateral chest wall, 5 mm skin nodule upper inner right chest without skin changes

## 2022-01-19 NOTE — HISTORY OF PRESENT ILLNESS
[FreeTextEntry1] : Ms. Lerman is a 46 year old woman with stage IIA, [id number](sn)M0 hormone receptor positive, HER2 negative invasive ductal carcinoma of the right breast. She underwent mastectomy with negative margins and had micrometastatic disease in 1 of 2 sentinel lymph nodes. She completed adjuvant chemotherapy with dose dense AC-T.  She has now completed adjuvant post-mastectomy radiation therapy, a dose of 5,000 cGy to the right chest wall, completed on 8/24/2021. started anastrozole on 11/3021.\par \par She comes today for follow up for a new development on her right side torso, adjacent to the breast, that was  discolored blue-black last week and tender.  The area is no longer discolored but still sore. It occurred in relation to stretching her arm up to reach a high shelf. In general, she feels the right side is tighter, barbara compared with the left.  She also noted a small nodule in the upper right chest, no pain, no bleeding, no skin changes. She is also having side effects from anastrazole, and is waiting call back from Dr. Matthews office.  Surgeon Dr. Portillo.

## 2022-02-14 ENCOUNTER — APPOINTMENT (OUTPATIENT)
Dept: PHYSICAL MEDICINE AND REHAB | Facility: CLINIC | Age: 47
End: 2022-02-14
Payer: COMMERCIAL

## 2022-02-14 PROCEDURE — 99214 OFFICE O/P EST MOD 30 MIN: CPT

## 2022-02-14 NOTE — HISTORY OF PRESENT ILLNESS
[FreeTextEntry1] : Navah Lerman is a 46 year old female with R breast ca s/p b/l mastectomy with micrometastatic disease in 1/2 sentinel LN, now s/p chemo and RT. She attended lymphedema therapy for prevention and continues home exercises. She was diagnosed with osteoporosis, requests recheck of vitamin D level today.  \par She reports R axillary tightness and tenderness have been improving. She has small area in left breast which is palpable, and is following up with oncology and surgery regarding possible biopsy. \par prediagnosis weight 128, 113 lb today. \par She is here for follow up sozo measurement.

## 2022-02-14 NOTE — PHYSICAL EXAM
[FreeTextEntry1] : \par GEN: AAOx3, NAD.\par PSYCH: Normal mood and affect. Responds appropriately to commands.\par EYES: Sclerae Anicteric. No discharge. EOMI.\par RESP: Breathing unlabored.\par CV: Radial pulses 2+ and equal. No varicosities noted.\par EXT: No C/C/E. \par sozo measurement -2.9 on the left, -1.0 on the right.\par SKIN: No lesions noted. Incisions well healed, no erythema or swelling \par LYMPH: No supraclavicular, anterior or posterior cervical lymphadenopathy appreciated.\par GAIT: Non antalgic, Normal reciprocating heel to toe.\par STRENGTH: 5-/5 bilateral upper extremities\par SENSATION: Grossly intact to light touch bilateral upper extremities.\par INSP: trace? swelling under R breast\par CERVICAL ROM: Flexion, extension, side-bending, rotation, oblique extension all full and pain free. \par SHOULDER ROM: Full and pain free bilaterally. mild tightness reported with ROM\par PALP: There is no tenderness over the midline spinous processes, paravertebral muscles, trapezius, levator scapulae or shoulder region bilaterally. No axillary tenderness appreciated\par SPECIAL: no axillary cording on exam\par \par

## 2022-02-14 NOTE — ASSESSMENT
[FreeTextEntry1] : 46 year old female pmh R breast ca s/p b/l mastectomy, chemo and radiation \par \par The patient had a follow-up SOZO measurement which I reviewed today. The score is -2.9, -1.0. Bioimpedance spectroscopy helps identify the onset of lymphedema in an arm or leg before patients experience noticeable swelling. Research has shown that the early detection of lymphedema using L-Dex combined with treatment can reduce progression to chronic lymphedema by 95% in breast cancer patients. Whenever possible, patients are tested for baseline L-Dex score before cancer treatment begins and then are reassessed during regular follow-up visits using the SOZO device. Otherwise, this can be started postoperatively and continued during regular follow-up visits. If the patient’s L-Dex score increases above normal levels, that is a sign that lymphedema is developing, and a referral is made to physical therapy for further evaluation and/or early compression treatment.  \par Lymphedema assessment with the SOZO L-Dex score is recommended to be done every 3 months for the first 3 years and then every 6 months for years 4 and 5, followed by annually afterwards.\par \par she now has flexitouch pump and would like to start using it, has mild swelling under R breast, will monitor for improvement.\par -patient considering OT for hand weakness, also can consider breast PT program if her tightness worsens. \par -follow up in 2- 3 months or as needed. \par \par \par \par \par

## 2022-02-15 LAB
25(OH)D3 SERPL-MCNC: 24.9 NG/ML
ANION GAP SERPL CALC-SCNC: 12 MMOL/L
BASOPHILS # BLD AUTO: 0.03 K/UL
BASOPHILS NFR BLD AUTO: 0.6 %
BUN SERPL-MCNC: 9 MG/DL
CALCIUM SERPL-MCNC: 9.9 MG/DL
CHLORIDE SERPL-SCNC: 102 MMOL/L
CO2 SERPL-SCNC: 25 MMOL/L
CREAT SERPL-MCNC: 0.62 MG/DL
EOSINOPHIL # BLD AUTO: 0.17 K/UL
EOSINOPHIL NFR BLD AUTO: 3.4 %
GLUCOSE SERPL-MCNC: 92 MG/DL
HCT VFR BLD CALC: 42.2 %
HGB BLD-MCNC: 13.3 G/DL
IMM GRANULOCYTES NFR BLD AUTO: 0.2 %
LYMPHOCYTES # BLD AUTO: 0.82 K/UL
LYMPHOCYTES NFR BLD AUTO: 16.2 %
MAN DIFF?: NORMAL
MCHC RBC-ENTMCNC: 28.7 PG
MCHC RBC-ENTMCNC: 31.5 GM/DL
MCV RBC AUTO: 91.1 FL
MONOCYTES # BLD AUTO: 0.56 K/UL
MONOCYTES NFR BLD AUTO: 11 %
NEUTROPHILS # BLD AUTO: 3.48 K/UL
NEUTROPHILS NFR BLD AUTO: 68.6 %
PLATELET # BLD AUTO: 177 K/UL
POTASSIUM SERPL-SCNC: 4.4 MMOL/L
RBC # BLD: 4.63 M/UL
RBC # FLD: 12.4 %
SODIUM SERPL-SCNC: 139 MMOL/L
WBC # FLD AUTO: 5.07 K/UL

## 2022-03-09 NOTE — HISTORY OF PRESENT ILLNESS
[FreeTextEntry1] : Pt is a 46 year old BRCA1/2 negative female referred for consultation by .... for R side small nodule upper chest. (5mm nodule) \par Pt recently moved to [place].\par Hx R IDC HR+ [id number], stage 2a, R mastectomy w/ negative margins, T2N1/2mi M0 - (L prophylactic w/ implants)\par Oncotype 31 - completed ddACT 6/9/21 and completed EBRT w/ Dr. Mary Ann Ibrahim 8/24/21\par Sees Dr. Jamie Matthews - taking anastrazole started 11/21\par Surgeon Dr. Portillo\par \par Sees Dr. Tello for PM&R\par \par 12/6/21, ZP, R axilla U/S: no susp findings, BR1\par 1/25/22, ZP, R U/S: no susp findings, no abnormality, rec clinical mgmt as needed, BR2\par \par Fhx:

## 2022-03-16 ENCOUNTER — APPOINTMENT (OUTPATIENT)
Dept: BREAST CENTER | Facility: CLINIC | Age: 47
End: 2022-03-16

## 2022-03-22 ENCOUNTER — APPOINTMENT (OUTPATIENT)
Dept: SURGICAL ONCOLOGY | Facility: CLINIC | Age: 47
End: 2022-03-22
Payer: COMMERCIAL

## 2022-03-22 VITALS
HEIGHT: 65.5 IN | RESPIRATION RATE: 16 BRPM | OXYGEN SATURATION: 95 % | BODY MASS INDEX: 19.1 KG/M2 | SYSTOLIC BLOOD PRESSURE: 99 MMHG | TEMPERATURE: 97.6 F | WEIGHT: 116 LBS | HEART RATE: 78 BPM | DIASTOLIC BLOOD PRESSURE: 65 MMHG

## 2022-03-22 PROCEDURE — 99204 OFFICE O/P NEW MOD 45 MIN: CPT

## 2022-03-22 NOTE — HISTORY OF PRESENT ILLNESS
[de-identified] : Ms. LERMAN is a 47 y/o female presenting today for an initial consultation referred by Dr. Jamie Matthews. \par \par She has a history of stage IIA/T2N1 invasive ductal carcinoma of the right breast (ER+/MS+/HER2-) for which she underwent a right therapeutic mastectomy with negative margins and a left prophylactic mastectomy (implant reconstruction) with Dr. Folr Portillo on 1/14/21.  Oncotype DX= 31.  She received adjuvant chemotherapy (ddACT) 6/9/21 and adjuvant RT with Dr. Ibrahim 8/24/21.  She began anastrazole in November 2021 and is followed by Dr. Jamie Matthews from medical oncology.\par \par She is BRCA negative.\par \par Given concern about a palpable mass in the right chest wall/reconstructed breast, she was referred for a right breast ultrasound on 1/12/22 with benign findings.  Implant is intact.  (BIRADS 2). \par \par Her past medical history includes osteoporosis, IBS, anxiety and depression.  Past surgical history includes a cholecystectomy and uterine myomectomy.  She denies any family history of cancer.  She denies alcohol or tobacco use.\par \par She is concerned about a small palpable area in the reconstructed right breast that she noticed in January 2022.  It has not changed over time.  It does not cause any pain or tenderness.

## 2022-03-22 NOTE — ASSESSMENT
[FreeTextEntry1] : IMP:\par History of stage IIA right breast cancer s/p bilateral mastectomies 1/2021 with adjuvant chemotherapy, RT and currently on anastrazole.\par Concern for palpable abnormality involving the right reconstructed breast, with no corresponding abnormalities on ultrasound.\par No suspicious findings on exam corresponding to the patients area of concern (?subcutaneous, ? related to implant, ? related to blood vessel)\par \par PLAN:\par Continue yearly surveillance\par Follow up with plastic surgery regarding nipple reconstruction and patient's palpable concern as there are no features suggesting recurrent disease\par

## 2022-03-22 NOTE — PHYSICAL EXAM
[Normal] : supple, no neck mass and thyroid not enlarged [Normal Supraclavicular Lymph Nodes] : normal supraclavicular lymph nodes [Normal Axillary Lymph Nodes] : normal axillary lymph nodes [Normal] : oriented to person, place and time, with appropriate affect [FreeTextEntry1] : AB present during exam  [de-identified] : Normal S1, S2.  Regular rate and rhythm. [de-identified] : Complete breast exam performed in supine and upright positions.  S/p bilateral mastectomies with implant reconstruction. Superficial 1 mm nodule right breast, ? subcutaneous ? related to implant, ? related to blood vessel. No enlarged axillary or supraclavicular lymph nodes bilaterally. [de-identified] : Clear breath sounds bilaterally, normal respiratory effort.

## 2022-03-25 ENCOUNTER — OUTPATIENT (OUTPATIENT)
Dept: OUTPATIENT SERVICES | Facility: HOSPITAL | Age: 47
LOS: 1 days | Discharge: HOME | End: 2022-03-25

## 2022-03-25 DIAGNOSIS — Z85.3 PERSONAL HISTORY OF MALIGNANT NEOPLASM OF BREAST: ICD-10-CM

## 2022-03-25 DIAGNOSIS — Z90.49 ACQUIRED ABSENCE OF OTHER SPECIFIED PARTS OF DIGESTIVE TRACT: Chronic | ICD-10-CM

## 2022-03-25 DIAGNOSIS — Z98.890 OTHER SPECIFIED POSTPROCEDURAL STATES: Chronic | ICD-10-CM

## 2022-03-29 ENCOUNTER — APPOINTMENT (OUTPATIENT)
Dept: HEMATOLOGY ONCOLOGY | Facility: CLINIC | Age: 47
End: 2022-03-29

## 2022-04-04 ENCOUNTER — APPOINTMENT (OUTPATIENT)
Dept: CARDIOLOGY | Facility: CLINIC | Age: 47
End: 2022-04-04

## 2022-04-06 ENCOUNTER — APPOINTMENT (OUTPATIENT)
Dept: ENDOCRINOLOGY | Facility: CLINIC | Age: 47
End: 2022-04-06
Payer: COMMERCIAL

## 2022-04-06 VITALS
BODY MASS INDEX: 20.03 KG/M2 | HEART RATE: 96 BPM | SYSTOLIC BLOOD PRESSURE: 106 MMHG | WEIGHT: 120.25 LBS | DIASTOLIC BLOOD PRESSURE: 68 MMHG | OXYGEN SATURATION: 93 % | HEIGHT: 65 IN

## 2022-04-06 PROCEDURE — 99213 OFFICE O/P EST LOW 20 MIN: CPT

## 2022-04-11 ENCOUNTER — APPOINTMENT (OUTPATIENT)
Dept: PHYSICAL MEDICINE AND REHAB | Facility: CLINIC | Age: 47
End: 2022-04-11
Payer: COMMERCIAL

## 2022-04-11 PROCEDURE — 99442: CPT

## 2022-04-12 ENCOUNTER — APPOINTMENT (OUTPATIENT)
Dept: HEMATOLOGY ONCOLOGY | Facility: CLINIC | Age: 47
End: 2022-04-12
Payer: COMMERCIAL

## 2022-04-12 DIAGNOSIS — R41.89 OTHER SYMPTOMS AND SIGNS INVOLVING COGNITIVE FUNCTIONS AND AWARENESS: ICD-10-CM

## 2022-04-12 PROCEDURE — 99443: CPT

## 2022-04-13 PROBLEM — R41.89 BRAIN FOG: Status: ACTIVE | Noted: 2022-04-13

## 2022-04-13 NOTE — REVIEW OF SYSTEMS
[Negative] : Psychiatric [FreeTextEntry9] : as above [de-identified] : as above [de-identified] : as above [de-identified] : as above

## 2022-04-13 NOTE — HISTORY OF PRESENT ILLNESS
[Home] : at home, [unfilled] , at the time of the visit. [Medical Office: ([facility])___] : at the medical office located in  [Verbal consent obtained from patient] : the patient, [unfilled] [Disease: _____________________] : Disease: [unfilled] [T: ___] : T[unfilled] [N: ___] : N[unfilled] [M: ___] : M[unfilled] [AJCC Stage: ____] : AJCC Stage: [unfilled] [de-identified] : The patient has h/o chronic "cystic breasts."  \par \par Her history of present illness began with a breast self-examination in approximately the third week of 11/2020 when she noticed a palpable mass in the upper right breast just above the nipple area.  She called her gynecologist and ultimately scheduled a diagnostic mammogram on 11/25/2020 with the finding of a spiculated mass in the supra-areolar region of the right breast, mid level to the chest wall, with suspicious microcalcifications identified, extending for approximately 4 cm with a suggestion of retraction with extension to the anterior surface of the breast approximately 2 cm inferior to the mass, suspicious microcalcifications identified within the mass; there were scattered groups of calcifications also identified in the left breast with questionable areas of nodularity.  A bilateral breast ultrasound was performed on that same date with a finding of a 4 cm spiculated mass in the right breast, 12 o'clock axis, 1 cm from the nipple, with a note that although other irregular areas of nodularity were present, tissue sampling of this mass was advised under ultrasound-guided core biopsy; the left breast had a 3 mm slightly irregular hypoechoic nodule at the 4 o'clock axis, 6 cm from the nipple with no area of suspicious architectural distortion or acoustical shadowing; no abnormal adenopathy was seen in either axilla.  On 12/02/2020, the patient underwent a right breast 12 o'clock axis core biopsy with a finding of moderately differentiated invasive duct carcinoma with focal squamous differentiation, with core biopsy specimen measuring 1.2 cm, estrogen receptor positive (80%), progesterone receptor positive (30%), and HER-2/lou negative (1+ staining).  She subsequently saw Dr. Flor Alfonso who then ordered a bilateral breast MRI which was performed on 12/15/2020 with a finding of a spiculated enhancing mass in the right upper central breast, middle depth which measured at least 3 x 4 cm corresponding to the biopsy-proven malignancy; there was nodule enhancement extending inferior, medial, and superior-lateral to the mass, which most likely represented a hematoma from the recent biopsy although satellite lesions could not be excluded; no evidence of contralateral breast disease was noted.  \par \par The patient ultimately proceeded on to a bilateral mastectomy, with the right mastectomy specimen showing 2 foci of invasive duct carcinoma, poorly differentiated, spanning 35 and 14 mm, respectively (the large focus demonstrated focal squamous differentiation), Mia-SBR score 9, margins negative, with non-extensive DCIS noted, evidence of lymphovascular invasion was identified, and 1/1 sentinel lymph node with a micrometastasis (1.6 mm), and 1/1 sentinel lymph node returning negative for metastasis.\par \par The patient had germline cancer genetic testing with a BRCA1/2 analyses with [employer] RNA Insight testing which returned with no pathogenic mutation.\par \par The patient was seen by Dr. Gina Jacob in consultation who recommended adjuvant dose-dense ACT per patient's verbal report; I do not have a copy of those reports for my review.\par \par She saw me in initial consultation on 2/2/21. I had an extensive discussion with the patient regarding her recently diagnosed T2 N1mi M0, stage IIA breast cancer, noting the potential implications of her pathologic prognostic factors on the subsequent risk of developing local and metastatic recurrence.  In light of the above, I recommended adjuvant antiestrogen therapy, with tamoxifen should she be chemically premenopausal versus an aromatase inhibitor such as anastrozole if she is chemically menopausal, noting the potential risks, benefits, anticipated side effects, as well as potential reduction of these two approaches regarding the risk of developing metastatic recurrence.  She has been amenorrheic for 13 months, and I have recommended she have an FSH, LH, and estradiol level to better determine whether she is fully postmenopausal or perimenopausal at this time prior to making definitive treatment recommendations regarding antiestrogen therapy.  In addition, I have recommended that she allow us to send her tumor off for Oncotype DX testing, noting the implications of this molecular profiling tool on the subsequent decision-making as to whether to add adjuvant chemotherapy to adjuvant antiestrogen therapy.  Should this return with a low-risk score, I noted that I would recommend only antiestrogen therapy.  Should it return with a high-risk score, I would recommend adding adjuvant chemotherapy, specifically preliminarily discussing adjuvant TC chemotherapy every 3 weeks for 4 cycles with Onpro for hematologic support noting the potential risks, benefits, and anticipated side effects.  The option of using scalp cooling technologies to diminish or avoid chemotherapy-induced alopecia was also preliminarily discussed.  The patient has asked me why I did not recommend dose-dense ACT chemotherapy as Dr. Jacob had done, and I noted that it is a fine regimen, for which there is strong evidence for potential benefit in women with triple negative breast cancer, and those with lymph node positive breast cancer.  As a matter fact, from the recent TAILORx study, there is a minimal difference between TC and dose-dense ACT. \par \par The OncotypeDX returned with a RS 31 = 24% ROR at 9  years with GUTIERREZ or AI alone and a group average absolute chemotherapy benefit of ~ 15%. \par She wished to proceed with DD AC>>T. STarted on 3/3/21\par \par S/P DD ACT 6/9/21\par \par S/P adjuvant XRT 08/24/2021\par Body Area Treated Response: Right breast\par Radiation Dose: Response: >=30Gy\par \par Had a DEXA with osteoporosis - was very upset - saw an endocrinologist and found to have low Vit D and started on vit D supplem as well as alendronate on 12/22/21.\par \par She also started anastrozole on 11/3021. [de-identified] : ER+ MA+ her 2 lou - [de-identified] : Seen for routine f/u. \par \par She also started anastrozole on 11/3021.\par \par She notes early morning "stiffness" that rapidly resolves with walking "by the time brush my teeth".  Easily tolerable for now.\par \par Also c/o "mild memory issues" that likely started while on chemo and persists to present time w/o change- thinks it is likely "chemo brain". .  \par \par Prior HFs have decreased and easily tolerated for now.  \par \par DEXA 9/21 - ostoporosis (T-3.1 spine, -3.0 fem neck, -2.9 total hip)

## 2022-04-20 ENCOUNTER — NON-APPOINTMENT (OUTPATIENT)
Age: 47
End: 2022-04-20

## 2022-04-20 ENCOUNTER — APPOINTMENT (OUTPATIENT)
Dept: RADIATION ONCOLOGY | Facility: CLINIC | Age: 47
End: 2022-04-20

## 2022-05-23 ENCOUNTER — OUTPATIENT (OUTPATIENT)
Dept: OUTPATIENT SERVICES | Facility: HOSPITAL | Age: 47
LOS: 1 days | Discharge: HOME | End: 2022-05-23
Payer: COMMERCIAL

## 2022-05-23 ENCOUNTER — RESULT REVIEW (OUTPATIENT)
Age: 47
End: 2022-05-23

## 2022-05-23 ENCOUNTER — APPOINTMENT (OUTPATIENT)
Dept: CARDIOLOGY | Facility: CLINIC | Age: 47
End: 2022-05-23
Payer: COMMERCIAL

## 2022-05-23 VITALS
HEIGHT: 65 IN | OXYGEN SATURATION: 98 % | RESPIRATION RATE: 16 BRPM | TEMPERATURE: 97.1 F | HEART RATE: 82 BPM | SYSTOLIC BLOOD PRESSURE: 99 MMHG | BODY MASS INDEX: 20.31 KG/M2 | DIASTOLIC BLOOD PRESSURE: 60 MMHG | WEIGHT: 121.89 LBS

## 2022-05-23 DIAGNOSIS — Z85.3 PERSONAL HISTORY OF MALIGNANT NEOPLASM OF BREAST: ICD-10-CM

## 2022-05-23 DIAGNOSIS — Z98.890 OTHER SPECIFIED POSTPROCEDURAL STATES: Chronic | ICD-10-CM

## 2022-05-23 DIAGNOSIS — Z90.49 ACQUIRED ABSENCE OF OTHER SPECIFIED PARTS OF DIGESTIVE TRACT: Chronic | ICD-10-CM

## 2022-05-23 LAB
25(OH)D3 SERPL-MCNC: 28.6 NG/ML
ALBUMIN SERPL ELPH-MCNC: 4.7 G/DL
ALP BLD-CCNC: 63 U/L
ALT SERPL-CCNC: 12 U/L
ANION GAP SERPL CALC-SCNC: 11 MMOL/L
AST SERPL-CCNC: 24 U/L
BASOPHILS # BLD AUTO: 0.03 K/UL — SIGNIFICANT CHANGE UP (ref 0–0.2)
BASOPHILS NFR BLD AUTO: 0.5 % — SIGNIFICANT CHANGE UP (ref 0–2)
BILIRUB SERPL-MCNC: 0.2 MG/DL
BUN SERPL-MCNC: 17 MG/DL
CALCIUM SERPL-MCNC: 9.2 MG/DL
CHLORIDE SERPL-SCNC: 100 MMOL/L
CHOLEST SERPL-MCNC: 207 MG/DL
CO2 SERPL-SCNC: 28 MMOL/L
CREAT SERPL-MCNC: 0.7 MG/DL
EGFR: 107 ML/MIN/1.73M2
EOSINOPHIL # BLD AUTO: 0.15 K/UL — SIGNIFICANT CHANGE UP (ref 0–0.5)
EOSINOPHIL NFR BLD AUTO: 2.5 % — SIGNIFICANT CHANGE UP (ref 0–6)
ESTIMATED AVERAGE GLUCOSE: 103 MG/DL
GLUCOSE SERPL-MCNC: 96 MG/DL
HBA1C MFR BLD HPLC: 5.2 %
HCT VFR BLD CALC: 40.2 % — SIGNIFICANT CHANGE UP (ref 34.5–45)
HDLC SERPL-MCNC: 105 MG/DL
HGB BLD-MCNC: 13.3 G/DL — SIGNIFICANT CHANGE UP (ref 11.5–15.5)
IMM GRANULOCYTES NFR BLD AUTO: 0.2 % — SIGNIFICANT CHANGE UP (ref 0–1.5)
LDLC SERPL CALC-MCNC: 88 MG/DL
LYMPHOCYTES # BLD AUTO: 0.9 K/UL — LOW (ref 1–3.3)
LYMPHOCYTES # BLD AUTO: 15.1 % — SIGNIFICANT CHANGE UP (ref 13–44)
MCHC RBC-ENTMCNC: 30.2 PG — SIGNIFICANT CHANGE UP (ref 27–34)
MCHC RBC-ENTMCNC: 33.1 G/DL — SIGNIFICANT CHANGE UP (ref 32–36)
MCV RBC AUTO: 91.4 FL — SIGNIFICANT CHANGE UP (ref 80–100)
MONOCYTES # BLD AUTO: 0.62 K/UL — SIGNIFICANT CHANGE UP (ref 0–0.9)
MONOCYTES NFR BLD AUTO: 10.4 % — SIGNIFICANT CHANGE UP (ref 2–14)
NEUTROPHILS # BLD AUTO: 4.24 K/UL — SIGNIFICANT CHANGE UP (ref 1.8–7.4)
NEUTROPHILS NFR BLD AUTO: 71.3 % — SIGNIFICANT CHANGE UP (ref 43–77)
NONHDLC SERPL-MCNC: 102 MG/DL
NRBC # BLD: 0 /100 WBCS — SIGNIFICANT CHANGE UP (ref 0–0)
NT-PROBNP SERPL-MCNC: 59 PG/ML
PLATELET # BLD AUTO: 168 K/UL — SIGNIFICANT CHANGE UP (ref 150–400)
POTASSIUM SERPL-SCNC: 4.8 MMOL/L
PROT SERPL-MCNC: 7.1 G/DL
RBC # BLD: 4.4 M/UL — SIGNIFICANT CHANGE UP (ref 3.8–5.2)
RBC # FLD: 12.2 % — SIGNIFICANT CHANGE UP (ref 10.3–14.5)
SODIUM SERPL-SCNC: 139 MMOL/L
TRIGL SERPL-MCNC: 72 MG/DL
TROPONIN-T, HIGH SENSITIVITY: <6 NG/L
WBC # BLD: 5.95 K/UL — SIGNIFICANT CHANGE UP (ref 3.8–10.5)
WBC # FLD AUTO: 5.95 K/UL — SIGNIFICANT CHANGE UP (ref 3.8–10.5)

## 2022-05-23 PROCEDURE — 93010 ELECTROCARDIOGRAM REPORT: CPT

## 2022-05-23 PROCEDURE — 93000 ELECTROCARDIOGRAM COMPLETE: CPT

## 2022-05-23 PROCEDURE — 99215 OFFICE O/P EST HI 40 MIN: CPT

## 2022-05-23 NOTE — HISTORY OF PRESENT ILLNESS
[FreeTextEntry1] : NAVAH LERMAN is a 46 year woman with a history of IBS and right sided HER2 negative breast cancer s/p ACT (3/2021-6/2021) and radiation who comes for follow up of fatigue and dyspnea on exertion.\par \par Interval History:\par Feels OK - no new complaints. Still experiences fatigue and reduced exercise tolerance, but walks a lot. Knows she should exercise, but trouble following through. No chest pain. No palpitations. No edema/orthopnea.\par \par Meds reviewed and without changes.\par \par History:\par She denies any history of heart disease, HTN, DM, HLD.\par \par She reports increased fatigue with exertion following chemotherapy. She feels winded after climbing 3 flights of stairs at home. She also feels tired after cleaning. She denies any exertional chest pain. Symptoms began following chemotherapy, but seem perhaps better since then. Baseline (pre-treatment) echo was normal (normal LVEF and GLS).\par \par Current medications include escitalopram for anxiety, aromatase inhibitor, alprazolam PRN, and alendronate.\par \par Cardiovascular Testing:\par ---------------------------------------\par ECG:\par 5/23/2022: NSR, borderline LAE. Otherwise normal ECG\par 1/3/2022: NSR, normal ECG\par ---------------------------------------\par Echo:\par 1/12/2022 ([facility]): Ef 60-65 %, no global longitudinal strain\par 2/25/2021: EF 60 %, GLS -22 %\par ---------------------------------------\par Stress:\par ---------------------------------------\par Cath:\par ---------------------------------------\par Vascular:\par ---------------------------------------\par MRI\par ---------------------------------------\par CT\par ---------------------------------------\par Sleep studies:

## 2022-05-23 NOTE — ASSESSMENT
[FreeTextEntry1] : 46 year old woman with history of anthracycline chemotherapy (~ 240 mg/m2) and right sided radiation, no other cardiac risk factors, seen for cardio-oncology evaluation.\par \par Continues to experience impaired exercise tolerance, which is common in breast cancer survivors following above chemotherapy/radiation. Discussed importance of regular aerobic exercise program and strategies for incorporating this into her lifestyle.\par - will try to jog/run first block of her walk\par - will try to do stairs in her house a few times per day\par \par Risk for cardiomyopathy: exam and ECG normal today. Post-treatment echo was not done with GLS as requested, but showed normal ventricular function.\par - troponin and pro-BNP today\par - lipid, A1c\par - Vitamin D at her request\par \par \par Above recommendations discussed with the patient and all questions answered to the best of my ability and to her apparent satisfaction.\par \par Follow up in 6 months with me.\par \par Total time spent on today's encounter was 45 minutes. >50% time spent in counseling and coordination of care and on addressing above medical conditions in assessment. Discussed risk factor reduction and lifestyle modification.\par \par  \par

## 2022-05-24 ENCOUNTER — APPOINTMENT (OUTPATIENT)
Dept: PHYSICAL MEDICINE AND REHAB | Facility: CLINIC | Age: 47
End: 2022-05-24
Payer: COMMERCIAL

## 2022-05-24 VITALS — OXYGEN SATURATION: 98 % | HEART RATE: 77 BPM | SYSTOLIC BLOOD PRESSURE: 108 MMHG | DIASTOLIC BLOOD PRESSURE: 68 MMHG

## 2022-05-24 DIAGNOSIS — F41.9 ANXIETY DISORDER, UNSPECIFIED: ICD-10-CM

## 2022-05-24 PROCEDURE — 93702 BIS XTRACELL FLUID ANALYSIS: CPT

## 2022-05-24 PROCEDURE — 99214 OFFICE O/P EST MOD 30 MIN: CPT | Mod: 25

## 2022-05-24 NOTE — HISTORY OF PRESENT ILLNESS
[FreeTextEntry1] : Navah Lerman is a 46 year old female with R breast ca s/p b/l mastectomy with micrometastatic disease in 1/2 sentinel LN, now s/p chemo and RT. She attended lymphedema therapy for prevention and continues home exercises.   She has a lymphedema pump but has not used it regularly because of questions with the fit and using it with her implant., states she will reach out to flexitouch.  focal area breast symptoms, ultrasound ordered in the past, patient would like to discuss with Dr. Ibrahim. She reports anxiety and sees her psychiatrist (on Celexa for depression), states she feels anxious about her disease and potential side effects.\par \par She had R 4th and 5th digit pain which resolved after changing how she used her mouse, avoided compression of the right elbow. \par \par monitor weight.  - 121 lb today\par She is here for follow up sozo measurement.   -1.6 RUE\par

## 2022-05-24 NOTE — ASSESSMENT
[FreeTextEntry1] : \par 46 year old female pmh R breast ca s/p b/l mastectomy, chemo and radiation \par \par The patient had a follow-up SOZO measurement which I reviewed today. The score is  R -1.6. Bioimpedance spectroscopy helps identify the onset of lymphedema in an arm or leg before patients experience noticeable swelling. Research has shown that the early detection of lymphedema using L-Dex combined with treatment can reduce progression to chronic lymphedema by 95% in breast cancer patients. Whenever possible, patients are tested for baseline L-Dex score before cancer treatment begins and then are reassessed during regular follow-up visits using the SOZO device. Otherwise, this can be started postoperatively and continued during regular follow-up visits. If the patient’s L-Dex score increases above normal levels, that is a sign that lymphedema is developing, and a referral is made to physical therapy for further evaluation and/or early compression treatment. \par Lymphedema assessment with the SOZO L-Dex score is recommended to be done every 3 months for the first 3 years and then every 6 months for years 4 and 5, followed by annually afterwards.\par \par she now has flexitouch pump, has mild swelling under R axilla\par she would like to start breast PT program for RUE tightness  \par -follow up in 4-6 months or as needed. \par -recommend follow up with psychiatry for anxiety and depression, also recommend support group \par I spent a total of 30 minutes on this encounter including documentation, face to face time, care coordination and reviewing prior records.\par \par \par \par \par

## 2022-05-26 ENCOUNTER — APPOINTMENT (OUTPATIENT)
Dept: RADIATION ONCOLOGY | Facility: CLINIC | Age: 47
End: 2022-05-26
Payer: COMMERCIAL

## 2022-05-26 VITALS
DIASTOLIC BLOOD PRESSURE: 58 MMHG | SYSTOLIC BLOOD PRESSURE: 92 MMHG | RESPIRATION RATE: 16 BRPM | WEIGHT: 121 LBS | HEART RATE: 62 BPM | BODY MASS INDEX: 20.16 KG/M2 | HEIGHT: 65 IN | OXYGEN SATURATION: 99 % | TEMPERATURE: 98 F

## 2022-05-26 PROCEDURE — 99212 OFFICE O/P EST SF 10 MIN: CPT

## 2022-05-27 NOTE — HISTORY OF PRESENT ILLNESS
[FreeTextEntry1] : Ms. Lerman is a 46 year old woman with stage IIA, [id number])M0 hormone receptor positive, HER2 negative invasive ductal carcinoma of the right breast. She underwent mastectomy with negative margins and had micrometastatic disease in 1 of 2 sentinel lymph nodes. She completed adjuvant chemotherapy with dose dense AC-T.  She has now completed adjuvant post-mastectomy radiation therapy, a dose of 5,000 cGy to the right chest wall, completed on 8/24/2021. Started anastrozole on 11/3021.\par \par She returns for regular follow-up.  She was last seen by Dr. Matthews on 4/12/22.  She continues to follow with Dr. Gerardo and does lymphedema pump therapy at home.  She is concerned about a "puffiness" lateral right breast, around the bra line, can be exacerbated by wearing bra. Some tenderness and swelling in same area. She is scheduled to have an ultrasound of that area.  She does not use her lymphedema pump regularly. She has waves of anxiety and depression and this week has been tough, as she had PT and cardiology follow up.  She continues on anastrazole. She continues stretching, moisturizing and massaging daily. \par \par

## 2022-05-27 NOTE — PHYSICAL EXAM
[Sclera] : the sclera and conjunctiva were normal [] : no respiratory distress [Nondistended] : nondistended [Normal] : oriented to person, place and time, the affect was normal, the mood was normal and not anxious [Heart Rate And Rhythm] : heart rate and rhythm were normal [No UE Edema] : there is no upper extremity edema [Abdomen Soft] : soft [Supraclavicular Lymph Nodes Enlarged Bilaterally] : supraclavicular [Axillary Lymph Nodes Enlarged Bilaterally] : axillary [de-identified] : Bilateral implants, mild superior contraction of right side, no hyperpigmentation, no skin lesions, no soft tissue nodules, minimal swelling right lateral chest wall, mildly tender

## 2022-05-27 NOTE — VITALS
[Maximal Pain Intensity: 4/10] : 4/10 [Least Pain Intensity: 0/10] : 0/10 [Pain Location: ___] : Pain Location: [unfilled] [80: Normal activity with effort; some signs or symptoms of disease.] : 80: Normal activity with effort; some signs or symptoms of disease.  [ECOG Performance Status: 1 - Restricted in physically strenuous activity but ambulatory and able to carry out work of a light or sedentary nature] : Performance Status: 1 - Restricted in physically strenuous activity but ambulatory and able to carry out work of a light or sedentary nature, e.g., light house work, office work [Pain Interferes with ADLs] : Pain does not interfere with activities of daily living

## 2022-05-27 NOTE — REVIEW OF SYSTEMS
[Fatigue] : fatigue [Anxiety] : anxiety [Depression] : depression [Negative] : Neurological [Fever] : no fever [FreeTextEntry9] : achiness

## 2022-06-09 ENCOUNTER — NON-APPOINTMENT (OUTPATIENT)
Age: 47
End: 2022-06-09

## 2022-06-10 ENCOUNTER — NON-APPOINTMENT (OUTPATIENT)
Age: 47
End: 2022-06-10

## 2022-06-14 ENCOUNTER — NON-APPOINTMENT (OUTPATIENT)
Age: 47
End: 2022-06-14

## 2022-06-17 ENCOUNTER — NON-APPOINTMENT (OUTPATIENT)
Age: 47
End: 2022-06-17

## 2022-06-20 ENCOUNTER — NON-APPOINTMENT (OUTPATIENT)
Age: 47
End: 2022-06-20

## 2022-06-20 ENCOUNTER — APPOINTMENT (OUTPATIENT)
Dept: HEMATOLOGY ONCOLOGY | Facility: CLINIC | Age: 47
End: 2022-06-20
Payer: COMMERCIAL

## 2022-06-20 DIAGNOSIS — F43.23 ADJUSTMENT DISORDER WITH MIXED ANXIETY AND DEPRESSED MOOD: ICD-10-CM

## 2022-06-20 PROCEDURE — 90791 PSYCH DIAGNOSTIC EVALUATION: CPT | Mod: 95

## 2022-06-21 PROBLEM — F43.23 ADJUSTMENT DISORDER WITH MIXED ANXIETY AND DEPRESSED MOOD: Status: ACTIVE | Noted: 2021-03-01

## 2022-06-24 ENCOUNTER — NON-APPOINTMENT (OUTPATIENT)
Age: 47
End: 2022-06-24

## 2022-06-28 ENCOUNTER — OUTPATIENT (OUTPATIENT)
Dept: OUTPATIENT SERVICES | Facility: HOSPITAL | Age: 47
LOS: 1 days | Discharge: HOME | End: 2022-06-28

## 2022-06-28 DIAGNOSIS — Z90.49 ACQUIRED ABSENCE OF OTHER SPECIFIED PARTS OF DIGESTIVE TRACT: Chronic | ICD-10-CM

## 2022-06-28 DIAGNOSIS — Z98.890 OTHER SPECIFIED POSTPROCEDURAL STATES: Chronic | ICD-10-CM

## 2022-06-28 DIAGNOSIS — Z85.3 PERSONAL HISTORY OF MALIGNANT NEOPLASM OF BREAST: ICD-10-CM

## 2022-07-12 ENCOUNTER — APPOINTMENT (OUTPATIENT)
Dept: HEMATOLOGY ONCOLOGY | Facility: CLINIC | Age: 47
End: 2022-07-12

## 2022-08-03 ENCOUNTER — NON-APPOINTMENT (OUTPATIENT)
Age: 47
End: 2022-08-03

## 2022-08-15 ENCOUNTER — APPOINTMENT (OUTPATIENT)
Dept: PHYSICAL MEDICINE AND REHAB | Facility: CLINIC | Age: 47
End: 2022-08-15

## 2022-08-29 ENCOUNTER — APPOINTMENT (OUTPATIENT)
Dept: PHYSICAL MEDICINE AND REHAB | Facility: CLINIC | Age: 47
End: 2022-08-29

## 2022-09-21 ENCOUNTER — NON-APPOINTMENT (OUTPATIENT)
Age: 47
End: 2022-09-21

## 2022-09-25 ENCOUNTER — APPOINTMENT (OUTPATIENT)
Dept: ORTHOPEDIC SURGERY | Facility: CLINIC | Age: 47
End: 2022-09-25

## 2022-09-25 VITALS — WEIGHT: 121 LBS | HEIGHT: 65 IN | BODY MASS INDEX: 20.16 KG/M2

## 2022-09-25 PROCEDURE — A4565: CPT

## 2022-09-25 PROCEDURE — 99203 OFFICE O/P NEW LOW 30 MIN: CPT

## 2022-09-25 NOTE — ASSESSMENT
[FreeTextEntry1] : A/P R chest pain likely scar tissue\par - sling prn\par - muscle relaxant\par - f/u with breast surgeon

## 2022-09-25 NOTE — IMAGING
[de-identified] : PE R side: no shoulder tenderness, limited shoulder motion, not tender at this time, NVI distally

## 2022-09-25 NOTE — HISTORY OF PRESENT ILLNESS
[Localized] : localized [Sharp] : sharp [Shooting] : shooting [Constant] : constant [de-identified] : 48 y/o RHD F presents with atraumatic right side chest pain since 9/20/22. pt lifted at heavy water cast 2 days before. denies numbness/tingling. denies SOB. no relief with aleve. relief with resting.\par \par h/o breast surgery with reconstruction 1.5 years ago [] : no

## 2022-09-27 NOTE — HISTORY OF PRESENT ILLNESS
[de-identified] : Ms. LERMAN is a 46 y/o female presenting today for a follow up visit for breast cancer. She was referred by Dr. Jamie Matthews. \par \par She has a history of stage IIA/T2N1 invasive ductal carcinoma of the right breast (ER+/WI+/HER2-) for which she underwent a right therapeutic mastectomy with negative margins and a left prophylactic mastectomy (implant reconstruction) with Dr. Flor Portillo on 1/14/21.  Oncotype DX= 31.  She received adjuvant chemotherapy (ddACT) 6/9/21 and adjuvant RT with Dr. Ibrahim 8/24/21.  She began anastrazole in November 2021 and is followed by Dr. Jamie Matthews from medical oncology.\par \par She is BRCA negative.\par \par Given concern about a palpable mass in the right chest wall/reconstructed breast, she was referred for a right breast ultrasound on 1/12/22 with benign findings.  Implant is intact.  (BIRADS 2). \par \par Her past medical history includes osteoporosis, IBS, anxiety and depression.  Past surgical history includes a cholecystectomy and uterine myomectomy.  She denies any family history of cancer.  She denies alcohol or tobacco use.\par \par She is concerned about a small palpable area in the reconstructed right breast that she noticed in January 2022.  It has not changed over time.  It does not cause any pain or tenderness.

## 2022-09-27 NOTE — ASSESSMENT
[FreeTextEntry1] : IMP:\par History of stage IIA right breast cancer s/p bilateral mastectomies 1/2021 with adjuvant chemotherapy, RT and currently on anastrazole.\par Concern for palpable abnormality involving the right reconstructed breast, with no corresponding abnormalities on ultrasound.\par \par PLAN:\par Continue yearly surveillance\par \par \par All medical entries were at my, Dr. Andrés Ochoa, direction. I have reviewed the chart and agree that the record accurately reflects my personal performance of the history, physical exam, assessment and plan. Our office Nurse Practitioner was present of the duration of the office visit.

## 2022-09-27 NOTE — PHYSICAL EXAM
[FreeTextEntry1] : SC present during exam  [Normal] : supple, no neck mass and thyroid not enlarged [Normal Supraclavicular Lymph Nodes] : normal supraclavicular lymph nodes [Normal Axillary Lymph Nodes] : normal axillary lymph nodes [Normal] : oriented to person, place and time, with appropriate affect [de-identified] : Normal S1, S2.  Regular rate and rhythm. [de-identified] : Complete breast exam performed in supine and upright positions.  S/p bilateral mastectomies with implant reconstruction. Superficial 1 mm nodule right breast, ? subcutaneous ? related to implant, ? related to blood vessel. No enlarged axillary or supraclavicular lymph nodes bilaterally. [de-identified] : Clear breath sounds bilaterally, normal respiratory effort.

## 2022-09-28 ENCOUNTER — APPOINTMENT (OUTPATIENT)
Dept: SURGICAL ONCOLOGY | Facility: CLINIC | Age: 47
End: 2022-09-28

## 2022-10-19 ENCOUNTER — OUTPATIENT (OUTPATIENT)
Dept: OUTPATIENT SERVICES | Facility: HOSPITAL | Age: 47
LOS: 1 days | Discharge: HOME | End: 2022-10-19

## 2022-10-19 DIAGNOSIS — Z85.3 PERSONAL HISTORY OF MALIGNANT NEOPLASM OF BREAST: ICD-10-CM

## 2022-10-19 DIAGNOSIS — Z98.890 OTHER SPECIFIED POSTPROCEDURAL STATES: Chronic | ICD-10-CM

## 2022-10-19 DIAGNOSIS — Z90.49 ACQUIRED ABSENCE OF OTHER SPECIFIED PARTS OF DIGESTIVE TRACT: Chronic | ICD-10-CM

## 2022-10-26 ENCOUNTER — APPOINTMENT (OUTPATIENT)
Dept: HEMATOLOGY ONCOLOGY | Facility: CLINIC | Age: 47
End: 2022-10-26

## 2022-10-26 VITALS
BODY MASS INDEX: 20.65 KG/M2 | WEIGHT: 124.12 LBS | HEART RATE: 80 BPM | TEMPERATURE: 97 F | RESPIRATION RATE: 16 BRPM | OXYGEN SATURATION: 95 % | DIASTOLIC BLOOD PRESSURE: 72 MMHG | SYSTOLIC BLOOD PRESSURE: 110 MMHG

## 2022-10-26 DIAGNOSIS — T45.1X5A FLUSHING: ICD-10-CM

## 2022-10-26 DIAGNOSIS — R23.2 FLUSHING: ICD-10-CM

## 2022-10-26 PROCEDURE — 99214 OFFICE O/P EST MOD 30 MIN: CPT

## 2022-10-26 NOTE — HISTORY OF PRESENT ILLNESS
[Disease: _____________________] : Disease: [unfilled] [T: ___] : T[unfilled] [N: ___] : N[unfilled] [M: ___] : M[unfilled] [AJCC Stage: ____] : AJCC Stage: [unfilled] [de-identified] : The patient has h/o chronic "cystic breasts."  \par \par Her history of present illness began with a breast self-examination in approximately the third week of 11/2020 when she noticed a palpable mass in the upper right breast just above the nipple area.  She called her gynecologist and ultimately scheduled a diagnostic mammogram on 11/25/2020 with the finding of a spiculated mass in the supra-areolar region of the right breast, mid level to the chest wall, with suspicious microcalcifications identified, extending for approximately 4 cm with a suggestion of retraction with extension to the anterior surface of the breast approximately 2 cm inferior to the mass, suspicious microcalcifications identified within the mass; there were scattered groups of calcifications also identified in the left breast with questionable areas of nodularity.  A bilateral breast ultrasound was performed on that same date with a finding of a 4 cm spiculated mass in the right breast, 12 o'clock axis, 1 cm from the nipple, with a note that although other irregular areas of nodularity were present, tissue sampling of this mass was advised under ultrasound-guided core biopsy; the left breast had a 3 mm slightly irregular hypoechoic nodule at the 4 o'clock axis, 6 cm from the nipple with no area of suspicious architectural distortion or acoustical shadowing; no abnormal adenopathy was seen in either axilla.  On 12/02/2020, the patient underwent a right breast 12 o'clock axis core biopsy with a finding of moderately differentiated invasive duct carcinoma with focal squamous differentiation, with core biopsy specimen measuring 1.2 cm, estrogen receptor positive (80%), progesterone receptor positive (30%), and HER-2/lou negative (1+ staining).  She subsequently saw Dr. Flor Alfonso who then ordered a bilateral breast MRI which was performed on 12/15/2020 with a finding of a spiculated enhancing mass in the right upper central breast, middle depth which measured at least 3 x 4 cm corresponding to the biopsy-proven malignancy; there was nodule enhancement extending inferior, medial, and superior-lateral to the mass, which most likely represented a hematoma from the recent biopsy although satellite lesions could not be excluded; no evidence of contralateral breast disease was noted.  \par \par The patient ultimately proceeded on to a bilateral mastectomy, with the right mastectomy specimen showing 2 foci of invasive duct carcinoma, poorly differentiated, spanning 35 and 14 mm, respectively (the large focus demonstrated focal squamous differentiation), Mia-SBR score 9, margins negative, with non-extensive DCIS noted, evidence of lymphovascular invasion was identified, and 1/1 sentinel lymph node with a micrometastasis (1.6 mm), and 1/1 sentinel lymph node returning negative for metastasis.\par \par The patient had germline cancer genetic testing with a BRCA1/2 analyses with [employer] [facility] RNA Insight testing which returned with no pathogenic mutation.\par \par The patient was seen by Dr. Gina Jacob in consultation who recommended adjuvant dose-dense ACT per patient's verbal report; I do not have a copy of those reports for my review.\par \par She saw me in initial consultation on 2/2/21. I had an extensive discussion with the patient regarding her recently diagnosed T2 N1mi M0, stage IIA breast cancer, noting the potential implications of her pathologic prognostic factors on the subsequent risk of developing local and metastatic recurrence.  In light of the above, I recommended adjuvant antiestrogen therapy, with tamoxifen should she be chemically premenopausal versus an aromatase inhibitor such as anastrozole if she is chemically menopausal, noting the potential risks, benefits, anticipated side effects, as well as potential reduction of these two approaches regarding the risk of developing metastatic recurrence.  She has been amenorrheic for 13 months, and I have recommended she have an FSH, LH, and estradiol level to better determine whether she is fully postmenopausal or perimenopausal at this time prior to making definitive treatment recommendations regarding antiestrogen therapy.  In addition, I have recommended that she allow us to send her tumor off for Oncotype DX testing, noting the implications of this molecular profiling tool on the subsequent decision-making as to whether to add adjuvant chemotherapy to adjuvant antiestrogen therapy.  Should this return with a low-risk score, I noted that I would recommend only antiestrogen therapy.  Should it return with a high-risk score, I would recommend adding adjuvant chemotherapy, specifically preliminarily discussing adjuvant TC chemotherapy every 3 weeks for 4 cycles with Onpro for hematologic support noting the potential risks, benefits, and anticipated side effects.  The option of using scalp cooling technologies to diminish or avoid chemotherapy-induced alopecia was also preliminarily discussed.  The patient has asked me why I did not recommend dose-dense ACT chemotherapy as Dr. Jacob had done, and I noted that it is a fine regimen, for which there is strong evidence for potential benefit in women with triple negative breast cancer, and those with lymph node positive breast cancer.  As a matter fact, from the recent TAILORx study, there is a minimal difference between TC and dose-dense ACT. \par \par The OncotypeDX returned with a RS 31 = 24% ROR at 9  years with GUTIERREZ or AI alone and a group average absolute chemotherapy benefit of ~ 15%. \par She wished to proceed with DD AC>>T. STarted on 3/3/21\par \par S/P DD ACT 6/9/21\par \par S/P adjuvant XRT 08/24/2021\par Body Area Treated Response: Right breast\par Radiation Dose: Response: >=30Gy\par \par Had a DEXA with osteoporosis - was very upset - saw an endocrinologist and found to have low Vit D and started on vit D supplem as well as alendronate on 12/22/21.\par \par She also started anastrozole on 11/3021. [de-identified] : ER+ PA+ her 2 lou - [de-identified] : Seen for routine f/u. \par \par Started anastrozole on 11/3021.\par \par Started alendronate 11/21. \par \par She notes early morning "stiffness" that rapidly resolves with walking "by the time I walk to my bathroom".\par \par Hs HFs - triggered by certain foods that she avoids - otherwise few and less intense than in the past. Can still have as many as 15/day. \par \par Energy level has improved but not back to pre dx baseline. Feels she has generalized muscle atrophy / weakness . \par \par Had episode of R upper chest wall pain, sudden onset and sharp x 1-2 days after lifting carton of water bottles - seen by orthopod and thought perhaps costochondritis vs "pulled muscle" per pt. Has fully resolved.   \par \par DEXA 9/21 - ostoporosis (T-3.1 spine, -3.0 fem neck, -2.9 total hip)

## 2022-10-26 NOTE — REVIEW OF SYSTEMS
[Negative] : Psychiatric [FreeTextEntry2] : as above [FreeTextEntry9] : as above [de-identified] : as above [de-identified] : as above

## 2022-10-26 NOTE — PHYSICAL EXAM
[Fully active, able to carry on all pre-disease performance without restriction] : Status 0 - Fully active, able to carry on all pre-disease performance without restriction [Normal] : affect appropriate [de-identified] : S/p B/L MRMs with implants - well healed scars, no palp masses. B/L ax neg

## 2022-10-27 ENCOUNTER — NON-APPOINTMENT (OUTPATIENT)
Age: 47
End: 2022-10-27

## 2022-11-08 ENCOUNTER — APPOINTMENT (OUTPATIENT)
Dept: PHYSICAL MEDICINE AND REHAB | Facility: CLINIC | Age: 47
End: 2022-11-08

## 2022-11-08 VITALS
SYSTOLIC BLOOD PRESSURE: 112 MMHG | HEART RATE: 93 BPM | DIASTOLIC BLOOD PRESSURE: 70 MMHG | TEMPERATURE: 97.16 F | OXYGEN SATURATION: 96 %

## 2022-11-08 PROCEDURE — 99214 OFFICE O/P EST MOD 30 MIN: CPT | Mod: 25

## 2022-11-08 PROCEDURE — 93702 BIS XTRACELL FLUID ANALYSIS: CPT

## 2022-11-08 NOTE — HISTORY OF PRESENT ILLNESS
[FreeTextEntry1] : Navah Lerman is a 47 year old female with R breast ca s/p b/l mastectomy with micrometastatic disease in 1/2 sentinel LN, now s/p chemo and RT. She attended lymphedema therapy for prevention and continues home exercises. She has a lymphedema pump.  She restarted PT for axillary tightness, planned for 3 sessions, recommend to continue home exercises and stretches.  She also had R chest wall tenderness since last visit, states she was diagnosed with costochondritis and symptoms now resolved.  \par \par monitor weight. 125 lb today\par She is here for follow up sozo measurement

## 2022-11-08 NOTE — ASSESSMENT
[FreeTextEntry1] : \par 47 year old female pmh R breast ca s/p b/l mastectomy, chemo and radiation \par \par The patient had a follow-up SOZO measurement which I reviewed today. The score is in the normal range. Bioimpedance spectroscopy helps identify the onset of lymphedema in an arm or leg before patients experience noticeable swelling. Research has shown that the early detection of lymphedema using L-Dex combined with treatment can reduce progression to chronic lymphedema by 95% in breast cancer patients. Whenever possible, patients are tested for baseline L-Dex score before cancer treatment begins and then are reassessed during regular follow-up visits using the SOZO device. Otherwise, this can be started postoperatively and continued during regular follow-up visits. If the patient’s L-Dex score increases above normal levels, that is a sign that lymphedema is developing, and a referral is made to physical therapy for further evaluation and/or early compression treatment. \par Lymphedema assessment with the SOZO L-Dex score is recommended to be done every 3 months for the first 3 years and then every 6 months for years 4 and 5, followed by annually afterwards.\par \par she now has flexitouch pump, has mild swelling under R axilla\par She is attending breast PT program for RUE tightness and strengthening\par -follow up in 3 months or as needed.  \par I spent a total of 30 minutes on this encounter including documentation, face to face time, care coordination and reviewing prior records.\par \par \par \par \par

## 2022-11-08 NOTE — PHYSICAL EXAM
[FreeTextEntry1] : GEN: AAOx3, NAD.\par PSYCH: tearful at times. Responds appropriately to commands.\par EYES: Sclerae Anicteric. No discharge. EOMI.\par RESP: Breathing unlabored.\par CV: Radial pulses 2+ and equal. No varicosities noted.\par EXT: No C/C/E. \par LUE: at 10 cm below olecranon 19 cm, at 10 cm above olecranon 25.5 cm\par RUE at 10 cm below olecranon 19.5 cm, at 10 cm above olecranon 26 cm \par SKIN: No lesions noted. Incisions well healed, no erythema or swelling \par LYMPH: No supraclavicular, anterior or posterior cervical lymphadenopathy appreciated.\par GAIT: Non antalgic, Normal reciprocating heel to toe.\par STRENGTH: 5-/5 bilateral upper extremities\par SENSATION: Grossly intact to light touch bilateral upper extremities. \par CERVICAL ROM: Flexion, extension, side-bending, rotation, oblique extension all full and pain free. \par SHOULDER ROM: Full and pain free bilaterally. mild tightness reported with ROM R shoulder\par SPECIAL: no axillary cording on exam\par

## 2022-11-09 ENCOUNTER — RX RENEWAL (OUTPATIENT)
Age: 47
End: 2022-11-09

## 2022-11-21 ENCOUNTER — APPOINTMENT (OUTPATIENT)
Dept: CARDIOLOGY | Facility: CLINIC | Age: 47
End: 2022-11-21

## 2022-12-12 ENCOUNTER — APPOINTMENT (OUTPATIENT)
Dept: CARDIOLOGY | Facility: CLINIC | Age: 47
End: 2022-12-12

## 2022-12-12 VITALS
SYSTOLIC BLOOD PRESSURE: 108 MMHG | HEIGHT: 65 IN | DIASTOLIC BLOOD PRESSURE: 69 MMHG | RESPIRATION RATE: 16 BRPM | WEIGHT: 124.56 LBS | BODY MASS INDEX: 20.75 KG/M2 | TEMPERATURE: 206.96 F | OXYGEN SATURATION: 95 % | HEART RATE: 76 BPM

## 2022-12-12 PROCEDURE — 93000 ELECTROCARDIOGRAM COMPLETE: CPT

## 2022-12-12 PROCEDURE — 93010 ELECTROCARDIOGRAM REPORT: CPT | Mod: 1L

## 2022-12-12 PROCEDURE — 99215 OFFICE O/P EST HI 40 MIN: CPT | Mod: 25

## 2022-12-12 RX ORDER — CITALOPRAM HYDROBROMIDE 20 MG/1
20 TABLET, FILM COATED ORAL
Qty: 90 | Refills: 0 | Status: ACTIVE | COMMUNITY
Start: 2021-12-27

## 2022-12-12 RX ORDER — CITALOPRAM HYDROBROMIDE 10 MG/1
10 TABLET, FILM COATED ORAL DAILY
Qty: 30 | Refills: 0 | Status: COMPLETED | COMMUNITY
Start: 2021-06-14 | End: 2022-12-12

## 2022-12-12 NOTE — REASON FOR VISIT
[FreeTextEntry3] : Dr. Matthews [FreeTextEntry1] : NAVAH LERMAN is a 47 year woman with a history of IBS and right sided ER+/ID+/HER2 negative breast cancer s/p ACT (3/2021-6/2021) and radiation who comes for follow up of fatigue and dyspnea on exertion.\par \par Prior Cancer Treatments:\par ------------------------------------------------------------------------\par Chemo/targeted therapy:\par ACT: 3/2021-6/2021\par 11/3/2021: anastrazole\par ------------------------------------------------------------------------\par Surgery:\par bilateral mastectomy \par ------------------------------------------------------------------------\par Radiation:\par adjuvant XRT to right breast > 30Gy completed 8/24/2021 69.6

## 2022-12-12 NOTE — HISTORY OF PRESENT ILLNESS
[FreeTextEntry1] : Interval History:\edgar Still gets short of breath and feels heart racing after exerting herself. Did not start aerobic exercise program.\par No palpitations. No chest pain. No new medications.\edgar Was treated for costochondritis. \par \par \par History:\par She reports increased fatigue with exertion following chemotherapy. She feels winded after climbing 3 flights of stairs at home. She also feels tired after cleaning. She denies any exertional chest pain. Symptoms began following chemotherapy, but seem perhaps better recently. Baseline (pre-treatment) echo was normal (normal LVEF and GLS).\par She denies any history of heart disease, HTN, DM, HLD.\par Current medications include escitalopram for anxiety, aromatase inhibitor, alprazolam PRN, and alendronate.\par \par May, 23, 2022:\edgar Feels OK - no new complaints. Still experiences fatigue and reduced exercise tolerance, but walks a lot. Knows she should exercise, but trouble following through. No chest pain. No palpitations. No edema/orthopnea.\par \par Meds reviewed and without changes.\par \par Cardiovascular Testing:\par ---------------------------------------\par ECG:\par 12/8/2022: NSR 72 bpm, normal ECG\par 5/23/2022: NSR, borderline LAE. Otherwise normal ECG\par 1/3/2022: NSR, normal ECG\par ---------------------------------------\par Echo:\par 1/12/2022 ([facility]): EF 60-65 %, no global longitudinal strain, no diastolic dysfunction noted\par 2/25/2021: EF 60 %, GLS -22 %\par ---------------------------------------

## 2022-12-12 NOTE — ASSESSMENT
[FreeTextEntry1] : 47 year old woman with history of anthracycline chemotherapy (~ 240 mg/m2) and right sided radiation (30Gy). \par \par Continues to experience impaired exercise tolerance, which is common in breast cancer survivors following above chemotherapy/radiation. She is at risk for coronary artery disease and diastolic dysfunction, however no diastolic dysfunction noted on (outside) post-treatment echo. Discussed importance of regular aerobic exercise program and strategies for incorporating this into her lifestyle.\par - will try to work with a \par \par Risk for cardiomyopathy: exam and ECG normal today. Post-treatment echo was not done with GLS as requested, but showed normal ventricular function and no significant diastolic impairment.\par - troponin and pro-BNP normal May 2022\par - lipid panel 5/2022: Total 207, non-, high HDL (105 mg/dL)\par - A1c 5.2 %\par - repeat echo in ~ 1 year\par - CAD screening at 5 years, optimize risk factors as above\par \par \par Above recommendations discussed with the patient and all questions answered to the best of my ability and to her apparent satisfaction.\par \par Follow up in 6 months\par

## 2023-01-11 ENCOUNTER — NON-APPOINTMENT (OUTPATIENT)
Age: 48
End: 2023-01-11

## 2023-02-17 ENCOUNTER — NON-APPOINTMENT (OUTPATIENT)
Age: 48
End: 2023-02-17

## 2023-02-17 ENCOUNTER — APPOINTMENT (OUTPATIENT)
Dept: PHYSICAL MEDICINE AND REHAB | Facility: CLINIC | Age: 48
End: 2023-02-17
Payer: COMMERCIAL

## 2023-02-17 VITALS
DIASTOLIC BLOOD PRESSURE: 74 MMHG | SYSTOLIC BLOOD PRESSURE: 113 MMHG | HEART RATE: 84 BPM | TEMPERATURE: 207.14 F | OXYGEN SATURATION: 97 %

## 2023-02-17 PROCEDURE — 99214 OFFICE O/P EST MOD 30 MIN: CPT

## 2023-02-17 PROCEDURE — 36415 COLL VENOUS BLD VENIPUNCTURE: CPT

## 2023-02-17 NOTE — HISTORY OF PRESENT ILLNESS
[FreeTextEntry1] : Navah Lerman is a 47 year old female with R breast ca s/p b/l mastectomy with micrometastatic disease in 1/2 sentinel LN, now s/p chemo and RT. She attended lymphedema therapy for prevention and continues home exercises. She has a lymphedema pump. She attended PT for axillary tightness and was recommend to continue home exercises and stretches.  She attended 5 sessions, feels it helped her tightness.   She She also had R chest wall tenderness  prior to November which has resolved.  \par \par monitor weight. 123 lb today\par She is here for follow up sozo measurement \par

## 2023-02-17 NOTE — ASSESSMENT
[FreeTextEntry1] : rising sozo measurements however still in normal range, patient asymptomatic, has lymphedema pump\par patient anxious about travel, risk for lymphedema\par recommend compression sleeve for patient to wear for travel \par follow up in 3 months or sooner if change in symptoms\par I spent a total of 30 minutes on this encounter including documentation, face to face time, care coordination and reviewing prior records. \par The patient had a follow-up SOZO measurement which I reviewed today. The score is ldex  L -2.0\par R  0.4. Bioimpedance spectroscopy helps identify the onset of lymphedema in an arm or leg before patients experience noticeable swelling. Research has shown that the early detection of lymphedema using L-Dex combined with treatment can reduce progression to chronic lymphedema by 95% in breast cancer patients. Whenever possible, patients are tested for baseline L-Dex score before cancer treatment begins and then are reassessed during regular follow-up visits using the SOZO device. Otherwise, this can be started postoperatively and continued during regular follow-up visits. If the patient’s L-Dex score increases above normal levels, that is a sign that lymphedema is developing, and a referral is made to physical therapy for further evaluation and/or early compression treatment.  \par Lymphedema assessment with the SOZO L-Dex score is recommended to be done every 3 months for the first 3 years and then every 6 months for years 4 and 5, followed by annually afterwards.\par \par \par \par \par \par

## 2023-02-17 NOTE — PHYSICAL EXAM
[FreeTextEntry1] : \par GEN: AAOx3, NAD.\par PSYCH: calm, Responds appropriately to commands.\par EYES: Sclerae Anicteric. No discharge. EOMI.\par RESP: Breathing unlabored.\par CV: Radial pulses 2+ and equal. No varicosities noted.\par EXT: No C/C/E. \par LUE: at 10 cm below olecranon 18.5 cm, at 10 cm above olecranon 25 cm\par RUE at 10 cm below olecranon 19.5 cm, at 10 cm above olecranon 25 cm \par SKIN: No lesions noted. Incisions well healed, no erythema or swelling \par LYMPH: No supraclavicular, anterior or posterior cervical lymphadenopathy appreciated.\par GAIT: Non antalgic, Normal reciprocating heel to toe.\par STRENGTH: 5-/5 bilateral upper extremities\par SENSATION: Grossly intact to light touch bilateral upper extremities. \par CERVICAL ROM: Flexion, extension, side-bending, rotation, oblique extension all full and pain free. \par SHOULDER ROM: Full and pain free bilaterally. mild tightness reported with ROM R shoulder\par SPECIAL: no axillary cording on exam\par \par ldex\par L  -2.0\par R  0.4\par

## 2023-02-23 ENCOUNTER — APPOINTMENT (OUTPATIENT)
Dept: RADIOLOGY | Facility: CLINIC | Age: 48
End: 2023-02-23
Payer: COMMERCIAL

## 2023-02-23 LAB — 25(OH)D3 SERPL-MCNC: 30.5 NG/ML

## 2023-02-23 PROCEDURE — 77080 DXA BONE DENSITY AXIAL: CPT

## 2023-03-06 ENCOUNTER — NON-APPOINTMENT (OUTPATIENT)
Age: 48
End: 2023-03-06

## 2023-03-06 ENCOUNTER — APPOINTMENT (OUTPATIENT)
Dept: RADIATION ONCOLOGY | Facility: CLINIC | Age: 48
End: 2023-03-06
Payer: COMMERCIAL

## 2023-03-06 VITALS
DIASTOLIC BLOOD PRESSURE: 73 MMHG | TEMPERATURE: 97.16 F | RESPIRATION RATE: 17 BRPM | SYSTOLIC BLOOD PRESSURE: 115 MMHG | BODY MASS INDEX: 20.48 KG/M2 | HEIGHT: 65 IN | WEIGHT: 122.91 LBS | HEART RATE: 83 BPM | OXYGEN SATURATION: 98 %

## 2023-03-06 PROCEDURE — 99212 OFFICE O/P EST SF 10 MIN: CPT

## 2023-03-08 NOTE — PHYSICAL EXAM
[] : no respiratory distress [Sclera] : the sclera and conjunctiva were normal [No UE Edema] : there is no upper extremity edema [Abdomen Soft] : soft [Nondistended] : nondistended [Supraclavicular Lymph Nodes Enlarged Bilaterally] : supraclavicular [Axillary Lymph Nodes Enlarged Bilaterally] : axillary [Normal] : no focal deficits [de-identified] : bilateral chest wall skin is smooth, no erythema, no nodules

## 2023-03-08 NOTE — HISTORY OF PRESENT ILLNESS
[FreeTextEntry1] : Ms. Lerman is a 47 year old woman with stage IIA, [id number](sn)M0 hormone receptor positive, HER2 negative invasive ductal carcinoma of the right breast. She underwent mastectomy with negative margins and had micrometastatic disease in 1 of 2 sentinel lymph nodes. She completed adjuvant chemotherapy with dose dense AC-T.  She has now completed adjuvant post-mastectomy radiation therapy, a dose of 5,000 cGy to the right chest wall, completed on 8/24/2021. Started anastrozole on 11/3021.  She returns for routine follow-up. \par \par The patient reports feeling generally well. She is involved in her usual activities. She has good energy level. She has a good appetite and denies dysphagia. She denies cough or shortness of breath. She continues regular skin care. There is a small hardness in the upper inner right chest wall, which she was told may be a clip. She has no breast/chest wall pain. She has full range of motion in her shoulders. She denies swelling, numbness or pain in her arms. She started anastrozole and is tolerating it well. \par \par \par

## 2023-03-08 NOTE — REVIEW OF SYSTEMS
[Fatigue] : fatigue [Anxiety] : anxiety [Depression] : depression [Abdominal Pain] : abdominal pain [Negative] : Constitutional [Fever] : no fever [FreeTextEntry9] : achiness

## 2023-03-08 NOTE — VITALS
[Least Pain Intensity: 0/10] : 0/10 [Pain Location: ___] : Pain Location: [unfilled] [Maximal Pain Intensity: 0/10] : 0/10 [Pain Interferes with ADLs] : Pain does not interfere with activities of daily living [90: Able to carry normal activity; minor signs or symptoms of disease.] : 90: Able to carry normal activity; minor signs or symptoms of disease.

## 2023-04-25 ENCOUNTER — NON-APPOINTMENT (OUTPATIENT)
Age: 48
End: 2023-04-25

## 2023-05-09 ENCOUNTER — RX RENEWAL (OUTPATIENT)
Age: 48
End: 2023-05-09

## 2023-05-12 ENCOUNTER — OUTPATIENT (OUTPATIENT)
Dept: OUTPATIENT SERVICES | Facility: HOSPITAL | Age: 48
LOS: 1 days | Discharge: HOME | End: 2023-05-12
Payer: COMMERCIAL

## 2023-05-12 DIAGNOSIS — Z98.890 OTHER SPECIFIED POSTPROCEDURAL STATES: Chronic | ICD-10-CM

## 2023-05-12 DIAGNOSIS — Z90.49 ACQUIRED ABSENCE OF OTHER SPECIFIED PARTS OF DIGESTIVE TRACT: Chronic | ICD-10-CM

## 2023-05-12 DIAGNOSIS — Z85.3 PERSONAL HISTORY OF MALIGNANT NEOPLASM OF BREAST: ICD-10-CM

## 2023-05-17 ENCOUNTER — APPOINTMENT (OUTPATIENT)
Dept: ENDOCRINOLOGY | Facility: CLINIC | Age: 48
End: 2023-05-17

## 2023-05-23 ENCOUNTER — APPOINTMENT (OUTPATIENT)
Dept: HEMATOLOGY ONCOLOGY | Facility: CLINIC | Age: 48
End: 2023-05-23

## 2023-06-05 ENCOUNTER — APPOINTMENT (OUTPATIENT)
Dept: PHYSICAL MEDICINE AND REHAB | Facility: CLINIC | Age: 48
End: 2023-06-05
Payer: COMMERCIAL

## 2023-06-05 VITALS
TEMPERATURE: 97.3 F | HEART RATE: 75 BPM | OXYGEN SATURATION: 97 % | SYSTOLIC BLOOD PRESSURE: 104 MMHG | DIASTOLIC BLOOD PRESSURE: 69 MMHG

## 2023-06-05 DIAGNOSIS — R53.1 WEAKNESS: ICD-10-CM

## 2023-06-05 PROCEDURE — 99214 OFFICE O/P EST MOD 30 MIN: CPT
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
## 2023-06-05 NOTE — PHYSICAL EXAM
[FreeTextEntry1] : GEN: AAOx3, NAD.\par PSYCH: calm, Responds appropriately to commands.\par EYES: Sclerae Anicteric. No discharge. EOMI.\par RESP: Breathing unlabored.\par CV: Radial pulses 2+ and equal. No varicosities noted.\par EXT: No C/C/E. \par LUE: at 10 cm below olecranon 18.5 cm, at 10 cm above olecranon 25 cm\par RUE at 10 cm below olecranon 19.5 cm, at 10 cm above olecranon 25.5 cm \par SKIN: No lesions noted. Incisions well healed, no erythema or swelling \par LYMPH: No supraclavicular, anterior or posterior cervical lymphadenopathy appreciated.\par GAIT: Non antalgic, Normal reciprocating heel to toe.\par STRENGTH: 5-/5 bilateral upper extremities\par SENSATION: Grossly intact to light touch bilateral upper extremities. \par CERVICAL ROM: Flexion, extension, side-bending, rotation, oblique extension all full and pain free. \par SHOULDER ROM: Full and pain free bilaterally. trace tightness reported with ROM R shoulder\par SPECIAL: no axillary cording on exam\par L dex score: L -1.7,  R 0.1

## 2023-06-15 NOTE — PHYSICAL EXAM
[Well Developed] : well developed [No Acute Distress] : no acute distress [Well Nourished] : well nourished [Normal Conjunctiva] : normal conjunctiva [No Xanthelasma] : no xanthelasma [Normal Venous Pressure] : normal venous pressure [No Carotid Bruit] : no carotid bruit [Normal S1, S2] : normal S1, S2 [No Murmur] : no murmur [No Rub] : no rub [No Gallop] : no gallop [Clear Lung Fields] : clear lung fields [Good Air Entry] : good air entry [No Respiratory Distress] : no respiratory distress  [Soft] : abdomen soft [Normal Gait] : normal gait [Gait - Sufficient for Exercise Testing] : gait - sufficient for exercise testing [No Edema] : no edema [No Cyanosis] : no cyanosis [No Clubbing] : no clubbing [No Varicosities] : no varicosities [Moves all extremities] : moves all extremities [No Focal Deficits] : no focal deficits [Normal Speech] : normal speech [Alert and Oriented] : alert and oriented [Normal memory] : normal memory

## 2023-06-16 ENCOUNTER — APPOINTMENT (OUTPATIENT)
Dept: HEMATOLOGY ONCOLOGY | Facility: CLINIC | Age: 48
End: 2023-06-16
Payer: COMMERCIAL

## 2023-06-16 ENCOUNTER — APPOINTMENT (OUTPATIENT)
Dept: CARDIOLOGY | Facility: CLINIC | Age: 48
End: 2023-06-16
Payer: COMMERCIAL

## 2023-06-16 VITALS
HEIGHT: 65 IN | OXYGEN SATURATION: 98 % | WEIGHT: 122.14 LBS | SYSTOLIC BLOOD PRESSURE: 108 MMHG | BODY MASS INDEX: 20.35 KG/M2 | TEMPERATURE: 98 F | HEART RATE: 62 BPM | DIASTOLIC BLOOD PRESSURE: 72 MMHG | RESPIRATION RATE: 16 BRPM

## 2023-06-16 DIAGNOSIS — R06.09 OTHER FORMS OF DYSPNEA: ICD-10-CM

## 2023-06-16 PROCEDURE — 93000 ELECTROCARDIOGRAM COMPLETE: CPT

## 2023-06-16 PROCEDURE — 99214 OFFICE O/P EST MOD 30 MIN: CPT

## 2023-06-16 PROCEDURE — 93010 ELECTROCARDIOGRAM REPORT: CPT | Mod: 1L

## 2023-06-16 PROCEDURE — 99214 OFFICE O/P EST MOD 30 MIN: CPT | Mod: 25

## 2023-06-16 NOTE — PHYSICAL EXAM
[Fully active, able to carry on all pre-disease performance without restriction] : Status 0 - Fully active, able to carry on all pre-disease performance without restriction [Normal] : affect appropriate [de-identified] : S/p B/L MRMs with implants - well healed scars, no palp masses. B/L ax neg
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
## 2023-06-16 NOTE — REVIEW OF SYSTEMS
[Negative] : Psychiatric [FreeTextEntry2] : as above [FreeTextEntry9] : as above [de-identified] : as above [de-identified] : as above

## 2023-06-16 NOTE — ASSESSMENT
[FreeTextEntry1] : 47 year old woman with history of anthracycline chemotherapy (~ 240 mg/m2) and right sided radiation (30Gy). \par \par Continues to experience impaired exercise tolerance, which occurs in breast cancer survivors following above chemotherapy/radiation. She is at risk for coronary artery disease and diastolic dysfunction, however no diastolic dysfunction noted on (outside) post-treatment echo. Discussed importance of regular aerobic exercise program and strategies for incorporating this into her lifestyle.\par - will try to walk more\par - again discussed stationary cycle\par \par Risk for cardiomyopathy: exam and ECG normal today. Post-treatment echo was not done with GLS as requested, but showed normal ventricular function and there was no significant diastolic dysfunction.\par - troponin and pro-BNP normal May 2022\par - lipid panel 5/2022: Total 207, non-, high HDL (105 mg/dL)\par - A1c 5.2 %\par - repeat echo in ~ 12/2023 (will call to coordinate)\par - CAD screening at 5 years, optimize risk factors as above\par \par \par Above recommendations discussed with the patient and all questions answered to the best of my ability and to her apparent satisfaction.\par \par Follow up in January 2024 after echo.

## 2023-06-16 NOTE — HISTORY OF PRESENT ILLNESS
[FreeTextEntry1] : Interval History:\par Energy level and fatigue are near baseline, but persist. She was unable to start an exercise program. She denies any new symptoms. She denies chest pain, palpitations, no new medications.\par \par History:\par She reports increased fatigue with exertion following chemotherapy. She feels winded after climbing 3 flights of stairs at home. She also feels tired after cleaning. She denies any exertional chest pain. Symptoms began following chemotherapy, but seem perhaps better recently. Baseline (pre-treatment) echo was normal (normal LVEF and GLS).\par She denies any history of heart disease, HTN, DM, HLD.\par Current medications include escitalopram for anxiety, aromatase inhibitor, alprazolam PRN, and alendronate.\par \par May, 23, 2022:\edgar Feels OK - no new complaints. Still experiences fatigue and reduced exercise tolerance, but walks a lot. Knows she should exercise, but trouble following through. No chest pain. No palpitations. No edema/orthopnea.\par \par Meds reviewed and without changes.\par \par Dec 12, 2022:\edgar Still gets short of breath and feels heart racing after exerting herself. Did not start aerobic exercise program.\par No palpitations. No chest pain. No new medications.\par Was treated for costochondritis. \par \par \par Cardiovascular Testing:\par ---------------------------------------\par ECG:\par 6/16/2023:NSR 63 bpm, normal ECG\par 12/8/2022: NSR 72 bpm, normal ECG\par 5/23/2022: NSR, borderline LAE. Otherwise normal ECG\par 1/3/2022: NSR, normal ECG\par ---------------------------------------\par Echo:\par 1/12/2022 ([facility]): EF 60-65 %, no global longitudinal strain, no diastolic dysfunction noted\par 2/25/2021: EF 60 %, GLS -22 %\par ---------------------------------------

## 2023-06-16 NOTE — REASON FOR VISIT
[Symptom and Test Evaluation] : symptom and test evaluation [FreeTextEntry3] : Dr. Matthews [FreeTextEntry1] : NAVAH LERMAN is a 47 year woman with a history of IBS and right sided ER+/TX+/HER2 negative breast cancer s/p ACT (3/2021-6/2021) and radiation who comes for follow up of fatigue and dyspnea on exertion.\par \par Prior Cancer Treatments:\par ------------------------------------------------------------------------\par Chemo/targeted therapy:\par ACT: 3/2021-6/2021\par 11/3/2021: anastrazole\par ------------------------------------------------------------------------\par Surgery:\par bilateral mastectomy \par ------------------------------------------------------------------------\par Radiation:\par adjuvant XRT to right breast > 30Gy completed 8/24/2021

## 2023-06-17 ENCOUNTER — RX RENEWAL (OUTPATIENT)
Age: 48
End: 2023-06-17

## 2023-08-17 ENCOUNTER — APPOINTMENT (OUTPATIENT)
Dept: RADIATION ONCOLOGY | Facility: CLINIC | Age: 48
End: 2023-08-17
Payer: COMMERCIAL

## 2023-08-17 VITALS
SYSTOLIC BLOOD PRESSURE: 108 MMHG | WEIGHT: 121.25 LBS | TEMPERATURE: 95.36 F | RESPIRATION RATE: 16 BRPM | OXYGEN SATURATION: 97 % | HEIGHT: 65 IN | DIASTOLIC BLOOD PRESSURE: 73 MMHG | BODY MASS INDEX: 20.2 KG/M2 | HEART RATE: 74 BPM

## 2023-08-17 PROCEDURE — 99212 OFFICE O/P EST SF 10 MIN: CPT

## 2023-08-17 RX ORDER — CYCLOBENZAPRINE HYDROCHLORIDE 5 MG/1
5 TABLET, FILM COATED ORAL 3 TIMES DAILY
Qty: 30 | Refills: 0 | Status: DISCONTINUED | COMMUNITY
Start: 2022-09-25 | End: 2023-08-17

## 2023-08-17 RX ORDER — ALENDRONATE SODIUM 70 MG/1
70 TABLET ORAL
Qty: 12 | Refills: 3 | Status: DISCONTINUED | COMMUNITY
Start: 2021-11-08 | End: 2023-08-17

## 2023-08-17 RX ORDER — ALPRAZOLAM 0.5 MG/1
0.5 TABLET ORAL
Refills: 0 | Status: DISCONTINUED | COMMUNITY
Start: 2021-03-01 | End: 2023-08-17

## 2023-08-22 NOTE — PHYSICAL EXAM
[Sclera] : the sclera and conjunctiva were normal [] : no respiratory distress [No UE Edema] : there is no upper extremity edema [Abdomen Soft] : soft [Nondistended] : nondistended [Supraclavicular Lymph Nodes Enlarged Bilaterally] : supraclavicular [Axillary Lymph Nodes Enlarged Bilaterally] : axillary [Normal] : oriented to person, place and time, the affect was normal, the mood was normal and not anxious [de-identified] : bilateral chest wall skin is smooth, no erythema, no nodules

## 2023-08-22 NOTE — HISTORY OF PRESENT ILLNESS
[FreeTextEntry1] : Ms. Lerman is a 47 year old woman with stage IIA, [id number](sn)M0 hormone receptor positive, HER2 negative invasive ductal carcinoma of the right breast. She underwent mastectomy with negative margins and had micrometastatic disease in 1 of 2 sentinel lymph nodes. She completed adjuvant chemotherapy with dose dense AC-T.  She has now completed adjuvant post-mastectomy radiation therapy, a dose of 5,000 cGy to the right chest wall, completed on 8/24/2021. Started anastrozole on 11/3021.  She returns for routine follow-up.   The patient reports feeling generally well. She is involved in her usual activities. She has good energy level. She denies cough or shortness of breath. She continues regular skin care.  She has no breast/chest wall pain. She has full range of motion in her shoulders. She denies swelling, numbness or pain in her arms. She is taking anastrozole and is tolerating it well.

## 2023-10-25 ENCOUNTER — APPOINTMENT (OUTPATIENT)
Dept: PHYSICAL MEDICINE AND REHAB | Facility: CLINIC | Age: 48
End: 2023-10-25

## 2023-12-04 ENCOUNTER — OUTPATIENT (OUTPATIENT)
Dept: OUTPATIENT SERVICES | Facility: HOSPITAL | Age: 48
LOS: 1 days | Discharge: HOME | End: 2023-12-04
Payer: COMMERCIAL

## 2023-12-04 DIAGNOSIS — Z90.49 ACQUIRED ABSENCE OF OTHER SPECIFIED PARTS OF DIGESTIVE TRACT: Chronic | ICD-10-CM

## 2023-12-04 DIAGNOSIS — Z98.890 OTHER SPECIFIED POSTPROCEDURAL STATES: Chronic | ICD-10-CM

## 2023-12-04 DIAGNOSIS — C50.911 MALIGNANT NEOPLASM OF UNSPECIFIED SITE OF RIGHT FEMALE BREAST: ICD-10-CM

## 2023-12-19 ENCOUNTER — APPOINTMENT (OUTPATIENT)
Dept: HEMATOLOGY ONCOLOGY | Facility: CLINIC | Age: 48
End: 2023-12-19
Payer: COMMERCIAL

## 2023-12-19 VITALS
RESPIRATION RATE: 16 BRPM | DIASTOLIC BLOOD PRESSURE: 53 MMHG | WEIGHT: 122.36 LBS | TEMPERATURE: 96.2 F | BODY MASS INDEX: 20.36 KG/M2 | OXYGEN SATURATION: 99 % | SYSTOLIC BLOOD PRESSURE: 84 MMHG | HEART RATE: 83 BPM

## 2023-12-19 DIAGNOSIS — M81.0 AGE-RELATED OSTEOPOROSIS W/OUT CURRENT PATHOLOGICAL FRACTURE: ICD-10-CM

## 2023-12-19 PROCEDURE — 99214 OFFICE O/P EST MOD 30 MIN: CPT

## 2023-12-21 PROBLEM — M81.0 OSTEOPOROSIS WITHOUT CURRENT PATHOLOGICAL FRACTURE, UNSPECIFIED OSTEOPOROSIS TYPE: Status: ACTIVE | Noted: 2021-10-20

## 2023-12-21 NOTE — PHYSICAL EXAM
[Fully active, able to carry on all pre-disease performance without restriction] : Status 0 - Fully active, able to carry on all pre-disease performance without restriction [Normal] : affect appropriate [de-identified] : S/p B/L MRMs with implants - well healed scars, no palp masses. B/L ax neg

## 2023-12-21 NOTE — HISTORY OF PRESENT ILLNESS
[Disease: _____________________] : Disease: [unfilled] [T: ___] : T[unfilled] [N: ___] : N[unfilled] [M: ___] : M[unfilled] [AJCC Stage: ____] : AJCC Stage: [unfilled] [de-identified] : The patient has h/o chronic "cystic breasts."  \par  \par  Her history of present illness began with a breast self-examination in approximately the third week of 11/2020 when she noticed a palpable mass in the upper right breast just above the nipple area.  She called her gynecologist and ultimately scheduled a diagnostic mammogram on 11/25/2020 with the finding of a spiculated mass in the supra-areolar region of the right breast, mid level to the chest wall, with suspicious microcalcifications identified, extending for approximately 4 cm with a suggestion of retraction with extension to the anterior surface of the breast approximately 2 cm inferior to the mass, suspicious microcalcifications identified within the mass; there were scattered groups of calcifications also identified in the left breast with questionable areas of nodularity.  A bilateral breast ultrasound was performed on that same date with a finding of a 4 cm spiculated mass in the right breast, 12 o'clock axis, 1 cm from the nipple, with a note that although other irregular areas of nodularity were present, tissue sampling of this mass was advised under ultrasound-guided core biopsy; the left breast had a 3 mm slightly irregular hypoechoic nodule at the 4 o'clock axis, 6 cm from the nipple with no area of suspicious architectural distortion or acoustical shadowing; no abnormal adenopathy was seen in either axilla.  On 12/02/2020, the patient underwent a right breast 12 o'clock axis core biopsy with a finding of moderately differentiated invasive duct carcinoma with focal squamous differentiation, with core biopsy specimen measuring 1.2 cm, estrogen receptor positive (80%), progesterone receptor positive (30%), and HER-2/lou negative (1+ staining).  She subsequently saw Dr. Flor Alfonso who then ordered a bilateral breast MRI which was performed on 12/15/2020 with a finding of a spiculated enhancing mass in the right upper central breast, middle depth which measured at least 3 x 4 cm corresponding to the biopsy-proven malignancy; there was nodule enhancement extending inferior, medial, and superior-lateral to the mass, which most likely represented a hematoma from the recent biopsy although satellite lesions could not be excluded; no evidence of contralateral breast disease was noted.  \par  \par  The patient ultimately proceeded on to a bilateral mastectomy, with the right mastectomy specimen showing 2 foci of invasive duct carcinoma, poorly differentiated, spanning 35 and 14 mm, respectively (the large focus demonstrated focal squamous differentiation), Mia-SBR score 9, margins negative, with non-extensive DCIS noted, evidence of lymphovascular invasion was identified, and 1/1 sentinel lymph node with a micrometastasis (1.6 mm), and 1/1 sentinel lymph node returning negative for metastasis.\par  \par  The patient had germline cancer genetic testing with a BRCA1/2 analyses with [employer] Next Cancer Plus RNA Insight testing which returned with no pathogenic mutation.\par  \par  The patient was seen by Dr. Gina Jacob in consultation who recommended adjuvant dose-dense ACT per patient's verbal report; I do not have a copy of those reports for my review.\par  \par  She saw me in initial consultation on 2/2/21. I had an extensive discussion with the patient regarding her recently diagnosed T2 N1mi M0, stage IIA breast cancer, noting the potential implications of her pathologic prognostic factors on the subsequent risk of developing local and metastatic recurrence.  In light of the above, I recommended adjuvant antiestrogen therapy, with tamoxifen should she be chemically premenopausal versus an aromatase inhibitor such as anastrozole if she is chemically menopausal, noting the potential risks, benefits, anticipated side effects, as well as potential reduction of these two approaches regarding the risk of developing metastatic recurrence.  She has been amenorrheic for 13 months, and I have recommended she have an FSH, LH, and estradiol level to better determine whether she is fully postmenopausal or perimenopausal at this time prior to making definitive treatment recommendations regarding antiestrogen therapy.  In addition, I have recommended that she allow us to send her tumor off for Oncotype DX testing, noting the implications of this molecular profiling tool on the subsequent decision-making as to whether to add adjuvant chemotherapy to adjuvant antiestrogen therapy.  Should this return with a low-risk score, I noted that I would recommend only antiestrogen therapy.  Should it return with a high-risk score, I would recommend adding adjuvant chemotherapy, specifically preliminarily discussing adjuvant TC chemotherapy every 3 weeks for 4 cycles with Onpro for hematologic support noting the potential risks, benefits, and anticipated side effects.  The option of using scalp cooling technologies to diminish or avoid chemotherapy-induced alopecia was also preliminarily discussed.  The patient has asked me why I did not recommend dose-dense ACT chemotherapy as Dr. Jacob had done, and I noted that it is a fine regimen, for which there is strong evidence for potential benefit in women with triple negative breast cancer, and those with lymph node positive breast cancer.  As a matter fact, from the recent TAILORx study, there is a minimal difference between TC and dose-dense ACT. \par  \par  The OncotypeDX returned with a RS 31 = 24% ROR at 9  years with GUTIERREZ or AI alone and a group average absolute chemotherapy benefit of ~ 15%. \par  She wished to proceed with DD AC>>T. STarted on 3/3/21\par  \par  S/P DD ACT 6/9/21\par  \par  S/P adjuvant XRT 08/24/2021\par  Body Area Treated Response: Right breast\par  Radiation Dose: Response: >=30Gy\par  \par  Had a DEXA with osteoporosis - was very upset - saw an endocrinologist and found to have low Vit D and started on vit D supplem as well as alendronate on 12/22/21.\par  \par  She also started anastrozole on 11/3021. [de-identified] : ER+ AR+ her 2 lou - [de-identified] : Seen for routine f/u.   Started anastrozole on 11/3021.  Has had fleeting bony aches after certain chores / activities that then fully resolve.   Has R groin / hip pain at times - seen by PT and told "likely tendinitis" and declined xray. As this persists is calling them to schedule an Xray. She is followed at "[facility]" and "[facility]".       Overall notes she feels much better than on last visit.   Has tapered herself off Xanax, several months ago an dis tapering herself off Celexa.   DEXA 9/21 - ostoporosis (T-3.1 spine, -3.0 fem neck, -2.9 total hip)

## 2024-01-01 ENCOUNTER — OUTPATIENT (OUTPATIENT)
Dept: OUTPATIENT SERVICES | Facility: HOSPITAL | Age: 49
LOS: 1 days | End: 2024-01-01
Payer: COMMERCIAL

## 2024-01-01 ENCOUNTER — OUTPATIENT (OUTPATIENT)
Dept: OUTPATIENT SERVICES | Facility: HOSPITAL | Age: 49
LOS: 1 days | Discharge: HOME | End: 2024-01-01

## 2024-01-01 DIAGNOSIS — Z98.890 OTHER SPECIFIED POSTPROCEDURAL STATES: Chronic | ICD-10-CM

## 2024-01-01 DIAGNOSIS — Z90.49 ACQUIRED ABSENCE OF OTHER SPECIFIED PARTS OF DIGESTIVE TRACT: Chronic | ICD-10-CM

## 2024-01-01 DIAGNOSIS — C79.51 SECONDARY MALIGNANT NEOPLASM OF BONE: ICD-10-CM

## 2024-01-01 DIAGNOSIS — C50.911 MALIGNANT NEOPLASM OF UNSPECIFIED SITE OF RIGHT FEMALE BREAST: ICD-10-CM

## 2024-01-01 DIAGNOSIS — Z00.8 ENCOUNTER FOR OTHER GENERAL EXAMINATION: ICD-10-CM

## 2024-01-01 LAB
BASOPHILS # BLD AUTO: 0.03 K/UL — SIGNIFICANT CHANGE UP (ref 0–0.2)
BASOPHILS NFR BLD AUTO: 0.5 % — SIGNIFICANT CHANGE UP (ref 0–2)
EOSINOPHIL # BLD AUTO: 0.25 K/UL — SIGNIFICANT CHANGE UP (ref 0–0.5)
EOSINOPHIL NFR BLD AUTO: 4.4 % — SIGNIFICANT CHANGE UP (ref 0–6)
HCT VFR BLD CALC: 39.7 % — SIGNIFICANT CHANGE UP (ref 34.5–45)
HGB BLD-MCNC: 13.5 G/DL — SIGNIFICANT CHANGE UP (ref 11.5–15.5)
IMM GRANULOCYTES NFR BLD AUTO: 0.4 % — SIGNIFICANT CHANGE UP (ref 0–0.9)
LYMPHOCYTES # BLD AUTO: 0.83 K/UL — LOW (ref 1–3.3)
LYMPHOCYTES # BLD AUTO: 14.6 % — SIGNIFICANT CHANGE UP (ref 13–44)
MCHC RBC-ENTMCNC: 30 PG — SIGNIFICANT CHANGE UP (ref 27–34)
MCHC RBC-ENTMCNC: 34 G/DL — SIGNIFICANT CHANGE UP (ref 32–36)
MCV RBC AUTO: 88.2 FL — SIGNIFICANT CHANGE UP (ref 80–100)
MONOCYTES # BLD AUTO: 0.7 K/UL — SIGNIFICANT CHANGE UP (ref 0–0.9)
MONOCYTES NFR BLD AUTO: 12.3 % — SIGNIFICANT CHANGE UP (ref 2–14)
NEUTROPHILS # BLD AUTO: 3.85 K/UL — SIGNIFICANT CHANGE UP (ref 1.8–7.4)
NEUTROPHILS NFR BLD AUTO: 67.8 % — SIGNIFICANT CHANGE UP (ref 43–77)
NRBC # BLD: 0 /100 WBCS — SIGNIFICANT CHANGE UP (ref 0–0)
NRBC BLD-RTO: 0 /100 WBCS — SIGNIFICANT CHANGE UP (ref 0–0)
PLATELET # BLD AUTO: 159 K/UL — SIGNIFICANT CHANGE UP (ref 150–400)
RBC # BLD: 4.5 M/UL — SIGNIFICANT CHANGE UP (ref 3.8–5.2)
RBC # FLD: 12.7 % — SIGNIFICANT CHANGE UP (ref 10.3–14.5)
WBC # BLD: 5.68 K/UL — SIGNIFICANT CHANGE UP (ref 3.8–10.5)
WBC # FLD AUTO: 5.68 K/UL — SIGNIFICANT CHANGE UP (ref 3.8–10.5)

## 2024-01-01 PROCEDURE — 78815 PET IMAGE W/CT SKULL-THIGH: CPT

## 2024-01-01 PROCEDURE — A9552: CPT

## 2024-01-12 NOTE — LETTER GREETING
[Dear  ___] : Dear  [unfilled], [Consult Letter:] : Your patient, [unfilled] was seen in my office today for consultation. [Please see my note below.] : Please see my note below. Comment: Risks, benefits, side effects of topical therapy vs oral antibiotics vs OCP vs spironolactone vs accutane discussed with patient. Mother currently very hesitant to start Accutane. Pt plays soccer all year round, worried about side effects \\Melissa questions answered.

## 2024-01-22 NOTE — HISTORY OF PRESENT ILLNESS
[FreeTextEntry1] : Ms. Lerman is a 48 year old woman with stage IIA, [id number](sn)M0 hormone receptor positive, HER2 negative invasive ductal carcinoma of the right breast. She underwent mastectomy with negative margins and had micrometastatic disease in 1 of 2 sentinel lymph nodes. She completed adjuvant chemotherapy with dose dense AC-T.  She has now completed adjuvant post-mastectomy radiation therapy, a dose of 5,000 cGy to the right chest wall, completed on 8/24/2021. Started anastrozole on 11/3021.  She returns for routine follow-up.   She continues anastrazole.

## 2024-01-25 ENCOUNTER — NON-APPOINTMENT (OUTPATIENT)
Age: 49
End: 2024-01-25

## 2024-01-26 ENCOUNTER — NON-APPOINTMENT (OUTPATIENT)
Age: 49
End: 2024-01-26

## 2024-01-29 ENCOUNTER — NON-APPOINTMENT (OUTPATIENT)
Age: 49
End: 2024-01-29

## 2024-01-29 ENCOUNTER — APPOINTMENT (OUTPATIENT)
Dept: RADIATION ONCOLOGY | Facility: CLINIC | Age: 49
End: 2024-01-29

## 2024-01-31 ENCOUNTER — OUTPATIENT (OUTPATIENT)
Dept: OUTPATIENT SERVICES | Facility: HOSPITAL | Age: 49
LOS: 1 days | End: 2024-01-31
Payer: SELF-PAY

## 2024-01-31 ENCOUNTER — APPOINTMENT (OUTPATIENT)
Dept: NUCLEAR MEDICINE | Facility: IMAGING CENTER | Age: 49
End: 2024-01-31
Payer: SELF-PAY

## 2024-01-31 DIAGNOSIS — Z00.8 ENCOUNTER FOR OTHER GENERAL EXAMINATION: ICD-10-CM

## 2024-01-31 DIAGNOSIS — Z98.890 OTHER SPECIFIED POSTPROCEDURAL STATES: Chronic | ICD-10-CM

## 2024-01-31 DIAGNOSIS — C50.911 MALIGNANT NEOPLASM OF UNSPECIFIED SITE OF RIGHT FEMALE BREAST: ICD-10-CM

## 2024-01-31 PROCEDURE — 78815 PET IMAGE W/CT SKULL-THIGH: CPT | Mod: 26,PI

## 2024-01-31 PROCEDURE — 78815 PET IMAGE W/CT SKULL-THIGH: CPT

## 2024-01-31 PROCEDURE — A9552: CPT

## 2024-02-05 ENCOUNTER — APPOINTMENT (OUTPATIENT)
Dept: HEMATOLOGY ONCOLOGY | Facility: CLINIC | Age: 49
End: 2024-02-05
Payer: COMMERCIAL

## 2024-02-05 ENCOUNTER — OUTPATIENT (OUTPATIENT)
Dept: OUTPATIENT SERVICES | Facility: HOSPITAL | Age: 49
LOS: 1 days | Discharge: HOME | End: 2024-02-05
Payer: COMMERCIAL

## 2024-02-05 VITALS
DIASTOLIC BLOOD PRESSURE: 72 MMHG | RESPIRATION RATE: 18 BRPM | HEIGHT: 65 IN | TEMPERATURE: 97.2 F | HEART RATE: 78 BPM | BODY MASS INDEX: 19.66 KG/M2 | WEIGHT: 118 LBS | SYSTOLIC BLOOD PRESSURE: 107 MMHG | OXYGEN SATURATION: 96 %

## 2024-02-05 DIAGNOSIS — Z98.890 OTHER SPECIFIED POSTPROCEDURAL STATES: Chronic | ICD-10-CM

## 2024-02-05 DIAGNOSIS — Z90.49 ACQUIRED ABSENCE OF OTHER SPECIFIED PARTS OF DIGESTIVE TRACT: Chronic | ICD-10-CM

## 2024-02-05 LAB
ALBUMIN SERPL ELPH-MCNC: 4.5 G/DL
ALP BLD-CCNC: 90 U/L
ALT SERPL-CCNC: 12 U/L
ANION GAP SERPL CALC-SCNC: 12 MMOL/L
APTT BLD: 35.5 SEC
AST SERPL-CCNC: 22 U/L
BILIRUB SERPL-MCNC: 0.3 MG/DL
BUN SERPL-MCNC: 10 MG/DL
CALCIUM SERPL-MCNC: 9.5 MG/DL
CANCER AG27-29 SERPL-ACNC: 127.6 U/ML
CEA SERPL-MCNC: 4.5 NG/ML
CHLORIDE SERPL-SCNC: 101 MMOL/L
CO2 SERPL-SCNC: 27 MMOL/L
CREAT SERPL-MCNC: 0.59 MG/DL
EGFR: 110 ML/MIN/1.73M2
GLUCOSE SERPL-MCNC: 93 MG/DL
HCT VFR BLD CALC: 39 %
HGB BLD-MCNC: 12.7 G/DL
INR PPP: 1.06 RATIO
MCHC RBC-ENTMCNC: 29.4 PG
MCHC RBC-ENTMCNC: 32.6 GM/DL
MCV RBC AUTO: 90.3 FL
PLATELET # BLD AUTO: 175 K/UL
POTASSIUM SERPL-SCNC: 4.9 MMOL/L
PROT SERPL-MCNC: 6.8 G/DL
PT BLD: 12.1 SEC
RBC # BLD: 4.32 M/UL
RBC # FLD: 12.4 %
SODIUM SERPL-SCNC: 140 MMOL/L
WBC # FLD AUTO: 4.6 K/UL

## 2024-02-05 PROCEDURE — 99214 OFFICE O/P EST MOD 30 MIN: CPT

## 2024-02-05 PROCEDURE — 99204 OFFICE O/P NEW MOD 45 MIN: CPT

## 2024-02-05 NOTE — HISTORY OF PRESENT ILLNESS
[Disease: _____________________] : Disease: [unfilled] [de-identified] : 48 year old female with history of Stage IIA [id number] ER+/NM+/HER2 1+ multifocal right poorly differentiated invasive ductal carcinoma s/p 1/14/21 b/l mastectomy/SLNB, Oncotype RS 31, adjuvant AC-T (2021), adjuvant RT 30Gy to the right breast (completed 8/24/21), and adjuvant anastrozole (11/2021-present) who presents today for a second opinion regarding new bone metastasis.  She had pain in the right inguinal area in August/September 2023 and saw the ortho, then went away on its own.  The pain came back in November.  Saw ortho again who recommended imaging or PT and she opted for PT.  Physical therapy did not help.  She saw Dr. Matthews who ordered an MRI R hip, followed by the PETCT.  She has back and right hip pain, limping.  Now reports the right rib pain.  Dr. Matthews has since retired and Dr. Neymar Obando had recommended a right iliac bone biopsy, which has not been scheduled yet.  She is on alendronate for osteoporosis since 12/22/21.  1/14/21 b/l mastectomy/R SLNB: Left prophylactic breast, mastectomy: - Fibrocystic change with calcifications, columnar cell change, duct ectasia, and apocrine metaplasia. - Pseudoangiomatous stromal hyperplasia (PASH) - Unremarkable nipple and skin. Right breast mastectomy: - Two foci of invasive ductal carcinoma, poorly differentiated, spanning 35 and 14 mm (the largest focus is showing focal squamous differentiation), Grade 3. - Nonextensive DCIS, high nuclear grade, cribriform, solid and comedo types with necrosis and calcifications. - LVI is identified - Mass 1: ER 90%, NM 90%, HER2 1+, Ki-67 25% - For the area with squamous differentiation: ER < 1%, NM < 1%, HER2 1+, Ki-67 25% - Mass 2: ER 99%, NM 10%, HER2 1+ 1/1 R SLN positive for micrometastasis, spanning 1.6 mm.  2/23/23 DEXA: osteoporosis Spine: -3.0, osteoporosis; Not significantly changed as compared with the prior study. Femoral neck: -2.8, osteoporosis. Total hip: -2.8, osteoporosis; Not significantly changed as compared with the prior study.  1/19/24 MRI R hip: extensive bony metastases, largest of which is in the right iliac bone/acetabulum.  1/31/24 PETCT  1. Multiple FDG-avid lucencies in the axial skeleton are compatible with osseous metastases. A mildly FDG-avid intramedullary soft tissue density within the proximal right femoral diaphysis is indeterminate, possibly bone marrow metastasis. 2. Mildly FDG-avid right apical and right upper lobe pulmonary opacities may be related to prior radiation therapy. The more inferiorly located opacity is contiguous with the pleura. In addition, there is mildly FDG-avid right upper posterior pleural thickening. These findings are nonspecific. Differential diagnosis includes inflammatory and neoplastic etiologies. 3. Indeterminate FDG-avid soft tissue nodule in left lateral chest wall adjacent to the eighth rib. Clinical correlation and further evaluation with ultrasound are recommended. 4. Nonspecific hypermetabolism in gastric antrum may be secondary to gastritis. Please correlate clinically and with endoscopy if indicated.

## 2024-02-05 NOTE — HISTORY OF PRESENT ILLNESS
[Disease: _____________________] : Disease: [unfilled] [de-identified] : 48 year old female with a history of breast cancer [de-identified] : Oncotype RS 31 [de-identified] : BRCA1/2 analyses with [employer] Next Cancer Plus RNA Insight testing which returned with no pathogenic mutation.

## 2024-02-06 ENCOUNTER — NON-APPOINTMENT (OUTPATIENT)
Age: 49
End: 2024-02-06

## 2024-02-06 ENCOUNTER — APPOINTMENT (OUTPATIENT)
Dept: HEMATOLOGY ONCOLOGY | Facility: CLINIC | Age: 49
End: 2024-02-06
Payer: COMMERCIAL

## 2024-02-06 ENCOUNTER — APPOINTMENT (OUTPATIENT)
Dept: RADIATION ONCOLOGY | Facility: CLINIC | Age: 49
End: 2024-02-06
Payer: COMMERCIAL

## 2024-02-06 VITALS
OXYGEN SATURATION: 99 % | TEMPERATURE: 96.1 F | BODY MASS INDEX: 20 KG/M2 | DIASTOLIC BLOOD PRESSURE: 66 MMHG | HEIGHT: 65 IN | WEIGHT: 120.04 LBS | SYSTOLIC BLOOD PRESSURE: 99 MMHG | RESPIRATION RATE: 18 BRPM | HEART RATE: 77 BPM

## 2024-02-06 PROCEDURE — 77334 RADIATION TREATMENT AID(S): CPT | Mod: 26

## 2024-02-06 PROCEDURE — 77263 THER RADIOLOGY TX PLNG CPLX: CPT

## 2024-02-06 PROCEDURE — 99214 OFFICE O/P EST MOD 30 MIN: CPT | Mod: 25

## 2024-02-06 PROCEDURE — 77290 THER RAD SIMULAJ FIELD CPLX: CPT | Mod: 26

## 2024-02-06 PROCEDURE — 99215 OFFICE O/P EST HI 40 MIN: CPT

## 2024-02-08 ENCOUNTER — APPOINTMENT (OUTPATIENT)
Dept: INTERVENTIONAL RADIOLOGY/VASCULAR | Facility: HOSPITAL | Age: 49
End: 2024-02-08

## 2024-02-08 ENCOUNTER — APPOINTMENT (OUTPATIENT)
Dept: CT IMAGING | Facility: HOSPITAL | Age: 49
End: 2024-02-08

## 2024-02-09 ENCOUNTER — NON-APPOINTMENT (OUTPATIENT)
Age: 49
End: 2024-02-09

## 2024-02-09 NOTE — REVIEW OF SYSTEMS
[Diarrhea: Grade 0] : Diarrhea: Grade 0 [Joint Pain] : joint pain [Joint Stiffness] : no joint stiffness [Muscle Pain] : no muscle pain [Suicidal] : not suicidal [Muscle Weakness] : no muscle weakness [Insomnia] : no insomnia [Anxiety] : anxiety [Depression] : no depression [Negative] : Allergic/Immunologic [FreeTextEntry9] : back and hip pain: occasional R sided rib pain also  [de-identified] : due to medical condition

## 2024-02-09 NOTE — HISTORY OF PRESENT ILLNESS
[Disease: _____________________] : Disease: [unfilled] [T: ___] : T[unfilled] [N: ___] : N[unfilled] [AJCC Stage: ____] : AJCC Stage: [unfilled] [de-identified] : Age 45: right breast cancer Palpable mass on self-exam: she had diagnostic mammogram on 11/25/2020 with the finding of a spiculated mass in the supra-areolar region of the right breast, mid level to the chest wall, with suspicious microcalcifications identified, extending for approximately 4 cm with a suggestion of retraction with extension to the anterior surface of the breast approximately 2 cm inferior to the mass, suspicious microcalcifications identified within the mass; there were scattered groups of calcifications also identified in the left breast with questionable areas of nodularity. A bilateral breast ultrasound was performed on that same date with a finding of a 4 cm spiculated mass in the right breast, 12 o'clock axis, 1 cm from the nipple, with a note that although other irregular areas of nodularity were present, tissue sampling of this mass was advised under ultrasound-guided core biopsy; the left breast had a 3 mm slightly irregular hypoechoic nodule at the 4 o'clock axis, 6 cm from the nipple with no area of suspicious architectural distortion or acoustical shadowing; no abnormal adenopathy was seen in either axilla. On 12/02/2020, the patient underwent a right breast 12 o'clock axis core biopsy with a finding of moderately differentiated invasive duct carcinoma with focal squamous differentiation, with core biopsy specimen measuring 1.2 cm, estrogen receptor positive (80%), progesterone receptor positive (30%), and HER-2/lou negative (1+ staining). She subsequently saw Dr. Flor Alfonso who then ordered a bilateral breast MRI which was performed on 12/15/2020 with a finding of a spiculated enhancing mass in the right upper central breast, middle depth which measured at least 3 x 4 cm corresponding to the biopsy-proven malignancy; there was nodule enhancement extending inferior, medial, and superior-lateral to the mass, which most likely represented a hematoma from the recent biopsy although satellite lesions could not be excluded; no evidence of contralateral breast disease was noted. She underwent a bilateral mastectomy, with the right mastectomy specimen showing 2 foci of invasive duct carcinoma, poorly differentiated, spanning 35 and 14 mm, respectively (the large focus demonstrated focal squamous differentiation), Mia-SBR score 9, margins negative, with non-extensive DCIS noted, evidence of lympho-vascular invasion was identified, and 1/1 sentinel lymph node with a micro-metastasis (1.6 mm), and 1/1 sentinel lymph node returning negative for metastasis. She had multiple oncology opinions: OncotypeDX returned with a RS 31 = 24% ROR at 9 years with GUTIERREZ or AI alone and a group average absolute chemotherapy benefit of ~ 15%. She underwent DD AC>>T 3/2021 to 6/2021 followed by RT to the chest wall. She started on anastrozole 11/2021.  Age 48: had symptomatic hip pain and had MRI of the hip done 1/19/2024 which showed extensive bony metastases largest in the R iliac bone/ acetabulum. She had Pet/ CT done on 1/31/2024 which showed multiple FDG avid lucencies in the axial skeleton compatible with osseous metastases SUV 12.8 at the iliac R; 3.9 in the T5 L transverse process,   [de-identified] : invasive ductal carcinoma ER positive, Her2 negative 2021; 2024: bx pending [de-identified] : [employer] Next Cancer Plus RNA Insight testing which returned with no pathogenic mutation 2020 ddACT 3 to 6/2021 RT 8/2021 anastrozole 11/2021 to 1/2024 [de-identified] : Her sister is helping her with visits. She is taking tylenol as needed for pain but occasionally pain gets worse with wt bearing, doing chores like vacuuming or sitting in 1 position too long. She has not tried the dexamethasone that we sent last week. She has concerns of osteoporosis worsening on steroids. She has questions and would like to review next steps: will be seeing Dr Walker next week. She saw Dr Ibrahim and had simulation today.

## 2024-02-09 NOTE — PHYSICAL EXAM
[Restricted in physically strenuous activity but ambulatory and able to carry out work of a light or sedentary nature] : Status 1- Restricted in physically strenuous activity but ambulatory and able to carry out work of a light or sedentary nature, e.g., light house work, office work [Normal] : affect appropriate [de-identified] : B mastectomy with implant reconstruction: no skin nodules or palpable axillary LN  [de-identified] : has limp with wt bearing that improves with walking

## 2024-02-09 NOTE — REASON FOR VISIT
[Follow-Up Visit] : a follow-up [FreeTextEntry2] : Continuity of care for breast cancer: new bone lesions seen on MRI and PET CT

## 2024-02-12 ENCOUNTER — TRANSCRIPTION ENCOUNTER (OUTPATIENT)
Age: 49
End: 2024-02-12

## 2024-02-12 ENCOUNTER — APPOINTMENT (OUTPATIENT)
Dept: ORTHOPEDIC SURGERY | Facility: CLINIC | Age: 49
End: 2024-02-12
Payer: COMMERCIAL

## 2024-02-12 ENCOUNTER — APPOINTMENT (OUTPATIENT)
Dept: ENDOCRINOLOGY | Facility: CLINIC | Age: 49
End: 2024-02-12

## 2024-02-12 VITALS
SYSTOLIC BLOOD PRESSURE: 110 MMHG | BODY MASS INDEX: 19.99 KG/M2 | WEIGHT: 120 LBS | HEIGHT: 65 IN | TEMPERATURE: 97.7 F | DIASTOLIC BLOOD PRESSURE: 72 MMHG | HEART RATE: 96 BPM

## 2024-02-12 DIAGNOSIS — G89.18 PAIN IN UNSPECIFIED JOINT: ICD-10-CM

## 2024-02-12 DIAGNOSIS — M25.50 PAIN IN UNSPECIFIED JOINT: ICD-10-CM

## 2024-02-12 PROCEDURE — 72190 X-RAY EXAM OF PELVIS: CPT

## 2024-02-12 PROCEDURE — 77334 RADIATION TREATMENT AID(S): CPT | Mod: 26

## 2024-02-12 PROCEDURE — 77307 TELETHX ISODOSE PLAN CPLX: CPT | Mod: 26

## 2024-02-12 PROCEDURE — 99204 OFFICE O/P NEW MOD 45 MIN: CPT

## 2024-02-14 ENCOUNTER — APPOINTMENT (OUTPATIENT)
Dept: INTERVENTIONAL RADIOLOGY/VASCULAR | Facility: CLINIC | Age: 49
End: 2024-02-14
Payer: COMMERCIAL

## 2024-02-14 ENCOUNTER — NON-APPOINTMENT (OUTPATIENT)
Age: 49
End: 2024-02-14

## 2024-02-14 VITALS — WEIGHT: 120 LBS | BODY MASS INDEX: 19.99 KG/M2 | HEIGHT: 65 IN

## 2024-02-14 DIAGNOSIS — M89.9 DISORDER OF BONE, UNSPECIFIED: ICD-10-CM

## 2024-02-14 PROCEDURE — 99203 OFFICE O/P NEW LOW 30 MIN: CPT

## 2024-02-14 RX ORDER — BACILLUS COAGULANS/INULIN 1B-250 MG
CAPSULE ORAL
Refills: 0 | Status: COMPLETED | COMMUNITY
End: 2024-02-14

## 2024-02-14 NOTE — REVIEW OF SYSTEMS
[Nasal Discharge] : nasal discharge [Sore Throat] : sore throat [Fever] : no fever [Chills] : no chills [Nosebleeds] : no nosebleeds [Chest Pain] : no chest pain [Palpitations] : no palpitations [Shortness Of Breath] : no shortness of breath [Wheezing] : no wheezing [Cough] : no cough [Abdominal Pain] : no abdominal pain [Easy Bleeding] : no tendency for easy bleeding [Easy Bruising] : no tendency for easy bruising

## 2024-02-14 NOTE — DISCUSSION/SUMMARY
[All Questions Answered] : Patient (and family) had all questions answered to an agreeable level of satisfaction [Interested in Proceeding] : Patient (and family) expressed understanding and interest in proceeding with the plan as outlined [de-identified] : Patient with metastatic breast cancer but is planning for biopsy to prove this bony disease.  Following this I would recommend radiation therapy to try and help with her pain.  I do not think that there is a lot of structural work that needs to be fixed with surgery right now.  I would follow her after radiation and see how she does 2 to 3 months afterwards.  She can follow-up for x-rays as well as clinical exam at that point.  Should she have any rapid increase in pain she can come back sooner.  She has no other pain in her arms or legs that need to be addressed at this point.  If imaging or pathology/biopsy was ordered, the patient was told to make an appointment to review findings right after all imaging is completed.  We discussed risks, benefits and alternatives. Rationale of care was reviewed and all questions were answered. Patient (and family) had all questions answered to her degree of the level of satisfaction. Patient (and family) expressed understanding and interest in proceeding with the plan as outlined.     This note was done with a voice recognition transcription software and any typos are related to this rather than medical error. Surgical risks reviewed. Patient (and family) had all questions answered to an agreeable level of satisfaction. Patient (and family) expressed understanding and interest in proceeding with the plan as outlined.

## 2024-02-14 NOTE — PHYSICAL EXAM
[FreeTextEntry1] : On exam patient stands in good balance.  She is able to walk although does have some pain with weightbearing on the left.  She has good hip flexion and some mild pain with external rotation more than internal rotation but is symmetric to the other side.  Her leg lengths are equal.  She has minimal pain with heel strike.  She has good strength throughout.  She has no lymphadenopathy.  Her calves are soft and she is neurovascularly intact. [General Appearance - Well-Appearing] : Well appearing [General Appearance - Well Nourished] : well nourished [Oriented To Time, Place, And Person] : Oriented to person, place, and time [Antalgic Gait Left] : antalgic on the left [Tenderness] : no tenderness [Full ROM Unless otherwise noted:] : Full range of motion unless otherwise noted: [LE  Motor Strength Normal unless otherwise noted:] : 5/5 strength in bilateral lower extemities unless otherwise noted. [Normal] : Sensation intact to light touch.

## 2024-02-14 NOTE — DATA REVIEWED
[Imaging Present] : Present [de-identified] : PET/CT January 31, 2024 shows: IMPRESSION: Abnormal skull-to-thigh FDG-PET/CT scan.  1. Multiple FDG-avid lucencies in the axial skeleton are compatible with osseous metastases. A mildly FDG-avid intramedullary soft tissue density within the proximal right femoral diaphysis is indeterminate, possibly bone marrow metastasis.  2. Mildly FDG-avid right apical and right upper lobe pulmonary opacities may be related to prior radiation therapy. The more inferiorly located opacity is contiguous with the pleura. In addition, there is mildly FDG-avid right upper posterior pleural thickening. These findings are nonspecific. Differential diagnosis includes inflammatory and neoplastic etiologies.  3. Indeterminate FDG-avid soft tissue nodule in left lateral chest wall adjacent to the eighth rib. Clinical correlation and further evaluation with ultrasound are recommended.  4. Nonspecific hypermetabolism in gastric antrum may be secondary to gastritis. Please correlate clinically and with endoscopy if indicated.  X-rays today 3 views of the pelvis with Judet views show some bone loss in the supra-acetabular area as well as in the ischium posterior to the left hip.  The right hip is intact.  There is no pathologic fracture.

## 2024-02-14 NOTE — HISTORY OF PRESENT ILLNESS
[FreeTextEntry1] : This is a 48-year-old female who was a few years into her diagnosis of breast cancer was found to have metastatic disease.  She was being worked up with a PET scan and has multiple locations of pain including the back rib and left hip.  She was planned for biopsy and then radiation however wanted to have my opinion prior to doing radiation to make sure nothing needs to be done surgically.  She does have pain with flexion and some with weightbearing. [Worsening] : worsening [___ mths] : [unfilled] month(s) ago [2] : currently ~his/her~ pain is 2 out of 10 [Bending] : worsened by bending [Direct Pressure] : worsened by direct pressure [Walking] : worsened by walking

## 2024-02-14 NOTE — REASON FOR VISIT
[Consultation] : a consultation visit [Other Location: e.g. School (Enter Location, City,State)___] : at [unfilled], at the time of the visit. [Medical Office: ([facility])___] : at the medical office located in  [Patient] : the patient [Self] : self [FreeTextEntry1] : right iliac bone biopsy

## 2024-02-14 NOTE — ASSESSMENT
[FreeTextEntry1] : 48-year-old female with metastatic breast cancer, status post bilateral mastectomy in 2021 presented with multiple bone lesions consistent with metastasis.  The largest PET avid bone mass is located within the right iliac bone, PET/CT dated 1/31/2024.  Patient is currently on pain management for right groin pain.  Assessment: The patient is an appropriate candidate for computed tomography guided right iliac bone mass needle biopsy.  The procedure, its risk, benefits and alternatives were discussed with the patient and informed consent obtained.  Plan: 1.  Sedation by anesthesia. 2.  Supine position.

## 2024-02-14 NOTE — HISTORY OF PRESENT ILLNESS
[Home] : at home, [unfilled] , at the time of the visit. [Medical Office: ([facility])___] : at the medical office located in  [Verbal consent obtained from patient] : the patient, [unfilled] [Stable] : stable [Other___] : [unfilled] [Daily] : ~His/Her~ symptoms seem to be occuring daily [Dull] : dull [Aching] : aching [FreeTextEntry1] : Patient is a 48-year-old female with a past medical history of invasive ductal carcinoma s/p 1/14/21 b/l mastectomy. She was treated with radiation and chemotherapy. In August/September of 2023 she was experieicning right inguinal pain which subsided after some time. In November 2023 the pain recurred. She was sent for MR hip and PET/CT which revealed osseous metastases. She was following with Dr. Matthews who has since left the practice and has now been referred to IR by Dr. Obando for consultation regarding a right iliac bone lesion biopsy.   Patient denies recent fever, chills, shortness of breath, chest pain, nausea, vomiting or diarrhea.  PET/CT 1/31/24, "Multiple FDG-avid lucencies in the axial skeleton are compatible with osseous metastases. A mildly FDG-avid intramedullary soft tissue density within the proximal right femoral diaphysis is indeterminate, possibly bone marrow metastasis."

## 2024-02-14 NOTE — REVIEW OF SYSTEMS
[Feeling Tired] : feeling tired [Joint Pain] : joint pain [Joint Stiffness] : no joint stiffness [Anxiety] : anxiety

## 2024-02-15 ENCOUNTER — TRANSCRIPTION ENCOUNTER (OUTPATIENT)
Age: 49
End: 2024-02-15

## 2024-02-16 ENCOUNTER — RESULT REVIEW (OUTPATIENT)
Age: 49
End: 2024-02-16

## 2024-02-16 ENCOUNTER — OUTPATIENT (OUTPATIENT)
Dept: OUTPATIENT SERVICES | Facility: HOSPITAL | Age: 49
LOS: 1 days | End: 2024-02-16
Payer: COMMERCIAL

## 2024-02-16 ENCOUNTER — NON-APPOINTMENT (OUTPATIENT)
Age: 49
End: 2024-02-16

## 2024-02-16 DIAGNOSIS — Z98.890 OTHER SPECIFIED POSTPROCEDURAL STATES: Chronic | ICD-10-CM

## 2024-02-16 DIAGNOSIS — Z90.49 ACQUIRED ABSENCE OF OTHER SPECIFIED PARTS OF DIGESTIVE TRACT: Chronic | ICD-10-CM

## 2024-02-16 DIAGNOSIS — C50.919 MALIGNANT NEOPLASM OF UNSPECIFIED SITE OF UNSPECIFIED FEMALE BREAST: ICD-10-CM

## 2024-02-16 PROCEDURE — 20220 BONE BIOPSY TROCAR/NDL SUPFC: CPT

## 2024-02-16 PROCEDURE — 88307 TISSUE EXAM BY PATHOLOGIST: CPT | Mod: 26

## 2024-02-16 PROCEDURE — 77012 CT SCAN FOR NEEDLE BIOPSY: CPT | Mod: 26

## 2024-02-16 PROCEDURE — 88341 IMHCHEM/IMCYTCHM EA ADD ANTB: CPT | Mod: 26

## 2024-02-16 PROCEDURE — 88342 IMHCHEM/IMCYTCHM 1ST ANTB: CPT | Mod: 26

## 2024-02-16 PROCEDURE — 88173 CYTOPATH EVAL FNA REPORT: CPT | Mod: 26

## 2024-02-16 RX ORDER — DEXAMETHASONE 0.5 MG/5ML
1 ELIXIR ORAL
Refills: 0 | DISCHARGE

## 2024-02-16 RX ORDER — CHOLECALCIFEROL (VITAMIN D3) 125 MCG
0 CAPSULE ORAL
Qty: 0 | Refills: 0 | DISCHARGE

## 2024-02-16 RX ORDER — ALPRAZOLAM 0.25 MG
1 TABLET ORAL
Qty: 0 | Refills: 0 | DISCHARGE

## 2024-02-16 NOTE — PROCEDURE NOTE - PROCEDURE FINDINGS AND DETAILS
Right iliac bone lesion biopsy with 2 13-gauge cores obtained. Specimen deemed adequate by the cytopathology technologist. Hemostasis achieved with manual pressure.

## 2024-02-16 NOTE — PRE PROCEDURE NOTE - PRE PROCEDURE EVALUATION
Interventional Radiology    HPI: 48y Female with metastatic breast Ca and right iliac bone lesion biopsy.    Allergies: No Known Allergies    Medications (Abx/Cardiac/Anticoagulation/Blood Products)      Data:    T(C): --  HR: --  BP: --  RR: --  SpO2: --    Exam  General: No acute distress  Chest: Non labored breathing  Abdomen: Non-distended  Extremities: No swelling, warm          Imaging: Reviewed    H&P reviewed from 2/14/24.    Plan: 48y Female presents for right iliac bone lesion biopsy.  -Risks/Benefits/alternatives explained with the patient and/or healthcare proxy and witnessed informed consent obtained.

## 2024-02-20 PROCEDURE — 77280 THER RAD SIMULAJ FIELD SMPL: CPT | Mod: 26

## 2024-02-21 ENCOUNTER — NON-APPOINTMENT (OUTPATIENT)
Age: 49
End: 2024-02-21

## 2024-02-21 NOTE — VITALS
[Maximal Pain Intensity: 7/10] : 7/10 [Least Pain Intensity: 1/10] : 1/10 [60: Requires occasional assistance, but is able to care for most of his/her needs] : 60: Requires occasional assistance, but is able to care for most of his/her needs

## 2024-02-21 NOTE — PHYSICAL EXAM
[Thin] : thin [Sclera] : the sclera and conjunctiva were normal [] : no respiratory distress [Heart Rate And Rhythm] : heart rate and rhythm were normal [Abdomen Soft] : soft [Nondistended] : nondistended [No Focal Deficits] : no focal deficits [Normal] : oriented to person, place and time, the affect was normal, the mood was normal and not anxious [de-identified] : antalgic gait

## 2024-02-21 NOTE — REASON FOR VISIT
[Routine Follow-Up] : routine follow-up visit for [Breast Cancer] : breast cancer [Re-evaluation] : re-evaluation for

## 2024-02-21 NOTE — HISTORY OF PRESENT ILLNESS
[FreeTextEntry1] : Ms. Lerman is a 48-year-old female with history of stage IIA, [id number])[id number] hormone receptor positive, HER2 negative invasive ductal carcinoma of the right breast. She underwent mastectomy with negative margins and had micrometastatic disease in 1 of 2 sentinel lymph nodes. She completed adjuvant chemotherapy with dose dense AC-T.  She completed adjuvant post-mastectomy radiation therapy, a dose of 5,000 cGy to the right chest wall and regional lymph nodes on 8/24/2021. She has been on anastrozole since 11/2021.  She returns today regarding the finding of new symptomatic bone metastasis.  She developed left-sided hip pain which progressed over the past few months. She was managed by an orthopedist without improvement. She underwent an MRI of the right hip on 1/19/24 which showed multiple metastatic bone lesions, the largest of which measured >9 cm involving the right iliac bone and acetabulum.   FDG PET/CT on 01/31/24 showed multiple osseous metastases, nonspecific mildly FDG-avid right upper posterior pleural thickening, indeterminate FDG-avid soft tissue nodule in left lateral chest wall adjacent to the eighth rib, and nonspecific hypermetabolism in gastric antrum.   On 02/05/2024, CEA 4.5 and CA 27-29 127.6   She is having pain centered at the right groin. She previously tried a Lidocaine patch, which was not helpful. She is currently taking ES Tylenol with some lessening of pain. The pain level is as high as 7 with certain movement, but as low as 1-2 at rest. About two weeks ago, she noted a new pain in the right lower rib area. She developed lower back pain about one month ago, described as initially mild, but stronger now and lasts longer. The pain sometimes feels more towards the left but does not radiate towards the front of her body.

## 2024-02-22 LAB — NON-GYNECOLOGICAL CYTOLOGY STUDY: SIGNIFICANT CHANGE UP

## 2024-02-23 ENCOUNTER — APPOINTMENT (OUTPATIENT)
Dept: CARDIOLOGY | Facility: CLINIC | Age: 49
End: 2024-02-23
Payer: COMMERCIAL

## 2024-02-23 ENCOUNTER — NON-APPOINTMENT (OUTPATIENT)
Age: 49
End: 2024-02-23

## 2024-02-23 VITALS
DIASTOLIC BLOOD PRESSURE: 62 MMHG | WEIGHT: 121.25 LBS | HEART RATE: 67 BPM | TEMPERATURE: 98 F | HEIGHT: 65 IN | SYSTOLIC BLOOD PRESSURE: 99 MMHG | BODY MASS INDEX: 20.2 KG/M2 | OXYGEN SATURATION: 96 %

## 2024-02-23 DIAGNOSIS — Z79.899 OTHER LONG TERM (CURRENT) DRUG THERAPY: ICD-10-CM

## 2024-02-23 DIAGNOSIS — Z79.811 LONG TERM (CURRENT) USE OF AROMATASE INHIBITORS: ICD-10-CM

## 2024-02-23 DIAGNOSIS — R11.2 NAUSEA WITH VOMITING, UNSPECIFIED: ICD-10-CM

## 2024-02-23 DIAGNOSIS — Z87.19 PERSONAL HISTORY OF OTHER DISEASES OF THE DIGESTIVE SYSTEM: ICD-10-CM

## 2024-02-23 DIAGNOSIS — T45.1X5A NAUSEA WITH VOMITING, UNSPECIFIED: ICD-10-CM

## 2024-02-23 DIAGNOSIS — Z91.89 OTHER SPECIFIED PERSONAL RISK FACTORS, NOT ELSEWHERE CLASSIFIED: ICD-10-CM

## 2024-02-23 DIAGNOSIS — R43.2 PARAGEUSIA: ICD-10-CM

## 2024-02-23 DIAGNOSIS — Z87.898 PERSONAL HISTORY OF OTHER SPECIFIED CONDITIONS: ICD-10-CM

## 2024-02-23 PROCEDURE — 93000 ELECTROCARDIOGRAM COMPLETE: CPT

## 2024-02-23 PROCEDURE — 93010 ELECTROCARDIOGRAM REPORT: CPT

## 2024-02-23 PROCEDURE — 99214 OFFICE O/P EST MOD 30 MIN: CPT | Mod: 25

## 2024-02-23 NOTE — PHYSICAL EXAM
[Well Developed] : well developed [Well Nourished] : well nourished [No Acute Distress] : no acute distress [Normal Conjunctiva] : normal conjunctiva [Normal Venous Pressure] : normal venous pressure [No Xanthelasma] : no xanthelasma [Normal S1, S2] : normal S1, S2 [No Carotid Bruit] : no carotid bruit [No Murmur] : no murmur [No Rub] : no rub [No Gallop] : no gallop [Clear Lung Fields] : clear lung fields [Good Air Entry] : good air entry [No Respiratory Distress] : no respiratory distress  [Soft] : abdomen soft [Normal Gait] : normal gait [Gait - Sufficient for Exercise Testing] : gait - sufficient for exercise testing [No Cyanosis] : no cyanosis [No Edema] : no edema [No Varicosities] : no varicosities [No Clubbing] : no clubbing [Moves all extremities] : moves all extremities [No Focal Deficits] : no focal deficits [Normal Speech] : normal speech [Alert and Oriented] : alert and oriented [Normal memory] : normal memory [No Rash] : no rash

## 2024-02-23 NOTE — REASON FOR VISIT
[FreeTextEntry1] : ---------------------------------------------------------------------- NAVAH LERMAN is a 48 year old woman with a history of IBS and right sided ER+/CO+/HER2 negative breast cancer s/p ACT (3/2021-6/2021) with recurrence in December 2023 who is seen for follow up.  Prior Cancer Treatments: ------------------------------------------------------------------------ Chemo/targeted therapy: ACT: 3/2021-6/2021 11/3/2021-1/2024: anastrozole ------------------------------------------------------------------------ Surgery: bilateral mastectomy  ------------------------------------------------------------------------ Radiation: 8/24/2021: completed > 30 Gy adjuvant XRT to right breast 2/2024: to right hip [FreeTextEntry3] : Dr. Matthews/[place]

## 2024-02-23 NOTE — HISTORY OF PRESENT ILLNESS
[FreeTextEntry1] : Interval History: An MRI done for hip and leg pain revealed numerous new metastatic lesions in the pelvis and hip. She began palliative radiation and additional treatment recommendations are currently pending. Understandably, this is all very upsetting and all the more difficult in the setting of transitioning her care from Dr. Matthews to her new care team. She denies chest pain or palpitations. She is worried about her heart. Sister is supporting her through things.   History: Navah Lerman was referred in 2022 for symptoms of increased fatigue during exertion following chemotherapy. She reported feeling winded after climbing 3 flights of stairs at home and tired after cleaning. She did not have exertional chest pain. Symptoms began following chemotherapy, but seem perhaps better since she is recovering. Baseline (pre Adriamycin) echo was normal (normal LVEF and GLS). She reports not having HTN, DM, HLD, or any history of heart disease.  An aerobic exercise program was recommended for treatment related deconditioning and impaired cardiorespiratory fitness.  May, 23, 2022: Feels OK - no new complaints. Still experiences fatigue and reduced exercise tolerance, but walks a lot. Knows she should exercise, but trouble following through. No chest pain. No palpitations. No edema/orthopnea.  Meds reviewed and without changes.  Dec 12, 2022: Still gets short of breath and feels heart racing after exerting herself. Did not start aerobic exercise program. No palpitations. No chest pain. No new medications. Was treated for costochondritis.   2023: Energy level and fatigue are near baseline, but persist. She was unable to start an exercise program. She denies any new symptoms. She denies chest pain, palpitations, no new medications.   Cardiovascular Testing: --------------------------------------- EC2024: NSR 63 bpm, possible LAE, normal ECG 2023: NSR 63 bpm, normal ECG 2022: NSR 72 bpm, normal ECG 2022: NSR, borderline LAE. Otherwise normal ECG 1/3/2022: NSR, normal ECG --------------------------------------- Echo: 2022 ([facility]): EF 60-65 %, no global longitudinal strain, no diastolic dysfunction noted 2021: EF 60 %, GLS -22 % ---------------------------------------

## 2024-02-23 NOTE — ASSESSMENT
[FreeTextEntry1] : --------------------------------------- 48 year old woman with history of anthracycline chemotherapy (~ 240 mg/m2) and right sided radiation (30Gy), no evident systolic or diastolic dysfunction on post-treatment echo, now unfortunately found to have recurrence with bony metastasis. She is undergoing palliative radiation to the right hip and pending discussion re additional therapy.   Currently, asymptomatic from the cardiac standpoint and ECG is normal.  Risk for cardiomyopathy: exam and ECG normal. Post-treatment echo was not done with GLS as requested, but showed normal ventricular function and there was no significant diastolic dysfunction. - troponin and pro-BNP normal May 2022 - lipid panel 5/2022: Total 207, non-, high HDL (105 mg/dL) - A1c 5.2 %  Will repeat echo now with GLS ([facility])  Emotional support provided. I asked her to keep me updated on the plan of care and will remain available to optimize cardiovascular risk as able along the way.  Follow up in 3 months with me.

## 2024-02-26 ENCOUNTER — NON-APPOINTMENT (OUTPATIENT)
Age: 49
End: 2024-02-26

## 2024-02-26 ENCOUNTER — APPOINTMENT (OUTPATIENT)
Dept: HEMATOLOGY ONCOLOGY | Facility: CLINIC | Age: 49
End: 2024-02-26
Payer: COMMERCIAL

## 2024-02-26 PROCEDURE — 99215 OFFICE O/P EST HI 40 MIN: CPT

## 2024-02-26 RX ORDER — FULVESTRANT 50 MG/ML
250 INJECTION, SOLUTION INTRAMUSCULAR
Refills: 0 | Status: ACTIVE | COMMUNITY
Start: 2024-02-26

## 2024-02-26 RX ORDER — ANASTROZOLE TABLETS 1 MG/1
1 TABLET ORAL DAILY
Qty: 90 | Refills: 3 | Status: DISCONTINUED | COMMUNITY
Start: 2021-08-24 | End: 2024-02-26

## 2024-02-26 NOTE — HISTORY OF PRESENT ILLNESS
[FreeTextEntry1] : Ms. Lerman is a 47 yo female who has a history of stage IIA breast cancer treated with definitive intent in 2021 and now presents with new metastatic disease. She is currently receiving radiation therapy to the right pelvic region.  She continues with 7/10 pain with movement, but as low as 0 at rest. No GI/ symptoms. Using Aquaphor. Waiting for biopsy results. Planning to get MRI of spine.

## 2024-02-26 NOTE — PHYSICAL EXAM
[Normal] : well developed, well nourished, in no acute distress [Abdomen Soft] : soft [Nondistended] : nondistended [de-identified] : antalgic gait

## 2024-02-26 NOTE — DISEASE MANAGEMENT
[MTNM] : 0 [TTNM] : 2 [NTNM] : 1mi [de-identified] : 400 cGy [de-identified] : 2000 cGy [de-identified] : right hip

## 2024-02-27 PROCEDURE — 77427 RADIATION TX MANAGEMENT X5: CPT

## 2024-02-27 NOTE — HISTORY OF PRESENT ILLNESS
[Disease: _____________________] : Disease: [unfilled] [T: ___] : T[unfilled] [N: ___] : N[unfilled] [AJCC Stage: ____] : AJCC Stage: [unfilled] [de-identified] : Age 45: right breast cancer Palpable mass on self-exam: she had diagnostic mammogram on 11/25/2020 with the finding of a spiculated mass in the supra-areolar region of the right breast, mid level to the chest wall, with suspicious microcalcifications identified, extending for approximately 4 cm with a suggestion of retraction with extension to the anterior surface of the breast approximately 2 cm inferior to the mass, suspicious microcalcifications identified within the mass; there were scattered groups of calcifications also identified in the left breast with questionable areas of nodularity. A bilateral breast ultrasound was performed on that same date with a finding of a 4 cm spiculated mass in the right breast, 12 o'clock axis, 1 cm from the nipple, with a note that although other irregular areas of nodularity were present, tissue sampling of this mass was advised under ultrasound-guided core biopsy; the left breast had a 3 mm slightly irregular hypoechoic nodule at the 4 o'clock axis, 6 cm from the nipple with no area of suspicious architectural distortion or acoustical shadowing; no abnormal adenopathy was seen in either axilla. On 12/02/2020, the patient underwent a right breast 12 o'clock axis core biopsy with a finding of moderately differentiated invasive duct carcinoma with focal squamous differentiation, with core biopsy specimen measuring 1.2 cm, estrogen receptor positive (80%), progesterone receptor positive (30%), and HER-2/lou negative (1+ staining). She subsequently saw Dr. Flor Alfonso who then ordered a bilateral breast MRI which was performed on 12/15/2020 with a finding of a spiculated enhancing mass in the right upper central breast, middle depth which measured at least 3 x 4 cm corresponding to the biopsy-proven malignancy; there was nodule enhancement extending inferior, medial, and superior-lateral to the mass, which most likely represented a hematoma from the recent biopsy although satellite lesions could not be excluded; no evidence of contralateral breast disease was noted. She underwent a bilateral mastectomy, with the right mastectomy specimen showing 2 foci of invasive duct carcinoma, poorly differentiated, spanning 35 and 14 mm, respectively (the large focus demonstrated focal squamous differentiation), Mia-SBR score 9, margins negative, with non-extensive DCIS noted, evidence of lympho-vascular invasion was identified, and 1/1 sentinel lymph node with a micro-metastasis (1.6 mm), and 1/1 sentinel lymph node returning negative for metastasis. She had multiple oncology opinions: OncotypeDX returned with a RS 31 = 24% ROR at 9 years with GUTIERREZ or AI alone and a group average absolute chemotherapy benefit of ~ 15%. She underwent DD AC>>T 3/2021 to 6/2021 followed by RT to the chest wall. She started on anastrozole 11/2021.  Age 48: had symptomatic hip pain and had MRI of the hip done 1/19/2024 which showed extensive bony metastases largest in the R iliac bone/ acetabulum. She had Pet/ CT done on 1/31/2024 which showed multiple FDG avid lucencies in the axial skeleton compatible with osseous metastases SUV 12.8 at the iliac R; 3.9 in the T5 L transverse process, She had bone biopsy done on the R iliac which was positive for malignant cells: ER 95%, WA 0%, Her2 negative (1). She started palliative RT to the R pelvic region.   [de-identified] : invasive ductal carcinoma ER positive, Her2 negative 2021; 2024: bx pending [de-identified] : [employer] RNA Insight testing which returned with no pathogenic mutation 2020 ddACT 3 to 6/2021 RT 8/2021 anastrozole 11/2021 to 1/2024 [de-identified] : She will finish RT tomorrow. She will then plan on getting MRi of the spine. She is taking dexamethasone and noticing if she misses a day: she will need 2 more days before she has better pain control. Sitting, there is not pain; pain occurs with weight bearing which is worse during the RT. She is wondering about next steps: tele-visit today.

## 2024-02-27 NOTE — PHYSICAL EXAM
[Ambulatory and capable of all self care but unable to carry out any work activities] : Status 2- Ambulatory and capable of all self care but unable to carry out any work activities. Up and about more than 50% of waking hours [Normal] : affect appropriate [de-identified] : normal respiratory effort

## 2024-02-27 NOTE — ASSESSMENT
[FreeTextEntry1] : She is a 47 y/o BRCA negative  F who had Stage II right invasive ductal carcinoma s/p ddACT followed by RT and had been on anastrozole since 2021. We reviewed her R iliac bone biopsy that is consistent with metastatic breast cancer to the bones and ER positive, OK negative, Her2 negative. We reviewed genomic testing to evaluate for resistance mutations. We reviewed that currently the standard of care treatment for pts progressing on AI therapy would be Kisqali (ribociclib) with Faslodex (fulvestrant). If PIK3 mutated and inavolisib approved, we may later add inavolisib to the ribo/ fulvestrant. We reviewed the goals of therapy to prolong disease free survival and also to maintain QOL. We reviewed ribociclib 600mg (3tabs) every day for 21 days and 1 week off. We reviewed potential AE including but not limited to: fatigue, GI upset, elevated liver enzymes, low blood counts, increased risk for infection, and arthralgias. We reviewed CBC check along with EKG to evaluate for QT prolongation every 2 weeks x 2 months. We reviewed CBC frequency depends on her counts but usually should be subsequently once a month to ensure count recovery around day 28 of each cycle. We reviewed potential AE of fulvestrant: im injection every 2 weeks x 1 month and then once every 28 days. We reviewed potential AE : pain at site of injection, hot flashes, joint pain, fatigue, and myalgias. Will get approval for ribo and fulvestrant. Questions answered to her satisfaction. She is agreeable with plan. Will set up follow up when she gets the ribo approved.   Bone mets: reviewed denosumab once every 28 days: she will get dental clearance prior to start.

## 2024-02-27 NOTE — REASON FOR VISIT
[Follow-Up Visit] : a follow-up [FreeTextEntry2] : follow up for metastatic breast cancer to the bones on RT

## 2024-02-27 NOTE — REVIEW OF SYSTEMS
[Diarrhea: Grade 0] : Diarrhea: Grade 0 [Joint Pain] : joint pain [Joint Stiffness] : no joint stiffness [Muscle Pain] : no muscle pain [Muscle Weakness] : no muscle weakness [Negative] : Allergic/Immunologic [FreeTextEntry9] : R hip/ back

## 2024-02-28 DIAGNOSIS — M89.8X8 OTHER SPECIFIED DISORDERS OF BONE, OTHER SITE: ICD-10-CM

## 2024-02-28 NOTE — DISEASE MANAGEMENT
[Pathological] : TNM Stage: p [IIA] : IIA [NTNM] : 1mi [TTNM] : 2 [MTNM] : 0 [de-identified] : 2000 cGy [de-identified] : 2000cGy [de-identified] : right hip

## 2024-02-28 NOTE — VITALS
[Maximal Pain Intensity: 0/10] : 0/10 [Least Pain Intensity: 0/10] : 0/10 [90: Able to carry normal activity; minor signs or symptoms of disease.] : 90: Able to carry normal activity; minor signs or symptoms of disease.  [Maximal Pain Intensity: 7/10] : 7/10 [60: Requires occasional assistance, but is able to care for most of his/her needs] : 60: Requires occasional assistance, but is able to care for most of his/her needs

## 2024-02-28 NOTE — REASON FOR VISIT
[Routine On-Treatment] : a routine on-treatment visit for [Breast Cancer] : breast cancer [Bone Metastasis] : bone metastasis [Family Member] : family member

## 2024-02-28 NOTE — HISTORY OF PRESENT ILLNESS
[FreeTextEntry1] : Ms. Lerman is a 47 yo female who has a history of stage IIA breast cancer treated with definitive intent in 2021 and now presents with new metastatic disease. She is currently receiving radiation therapy to the right pelvic region. She presents today for on treatment visit.  She completes treatment today. She continues to report pain 7/10. She now reports increased difficulty walking with weight bearing, feeling like her leg cannot support her weight, since yesterday. She denies GI/ symptoms. Using Aquaphor as prescribed.   Patient states her medical oncologist called yesterday with her bone biopsy result ER+ VT-, HER 2-.

## 2024-02-29 RX ORDER — DEXAMETHASONE 2 MG/1
2 TABLET ORAL
Qty: 20 | Refills: 0 | Status: DISCONTINUED | COMMUNITY
Start: 2024-01-31 | End: 2024-02-29

## 2024-03-04 ENCOUNTER — NON-APPOINTMENT (OUTPATIENT)
Age: 49
End: 2024-03-04

## 2024-03-07 ENCOUNTER — OUTPATIENT (OUTPATIENT)
Dept: OUTPATIENT SERVICES | Facility: HOSPITAL | Age: 49
LOS: 1 days | Discharge: HOME | End: 2024-03-07
Payer: COMMERCIAL

## 2024-03-07 ENCOUNTER — NON-APPOINTMENT (OUTPATIENT)
Age: 49
End: 2024-03-07

## 2024-03-07 DIAGNOSIS — Z90.49 ACQUIRED ABSENCE OF OTHER SPECIFIED PARTS OF DIGESTIVE TRACT: Chronic | ICD-10-CM

## 2024-03-07 DIAGNOSIS — Z98.890 OTHER SPECIFIED POSTPROCEDURAL STATES: Chronic | ICD-10-CM

## 2024-03-07 DIAGNOSIS — C50.911 MALIGNANT NEOPLASM OF UNSPECIFIED SITE OF RIGHT FEMALE BREAST: ICD-10-CM

## 2024-03-11 ENCOUNTER — NON-APPOINTMENT (OUTPATIENT)
Age: 49
End: 2024-03-11

## 2024-03-12 ENCOUNTER — APPOINTMENT (OUTPATIENT)
Dept: INFUSION THERAPY | Facility: HOSPITAL | Age: 49
End: 2024-03-12

## 2024-03-14 ENCOUNTER — NON-APPOINTMENT (OUTPATIENT)
Age: 49
End: 2024-03-14

## 2024-03-21 ENCOUNTER — NON-APPOINTMENT (OUTPATIENT)
Age: 49
End: 2024-03-21

## 2024-03-22 ENCOUNTER — NON-APPOINTMENT (OUTPATIENT)
Age: 49
End: 2024-03-22

## 2024-03-27 ENCOUNTER — RESULT REVIEW (OUTPATIENT)
Age: 49
End: 2024-03-27

## 2024-03-27 ENCOUNTER — APPOINTMENT (OUTPATIENT)
Dept: HEMATOLOGY ONCOLOGY | Facility: CLINIC | Age: 49
End: 2024-03-27
Payer: COMMERCIAL

## 2024-03-27 ENCOUNTER — APPOINTMENT (OUTPATIENT)
Dept: HEMATOLOGY ONCOLOGY | Facility: CLINIC | Age: 49
End: 2024-03-27

## 2024-03-27 ENCOUNTER — APPOINTMENT (OUTPATIENT)
Dept: INFUSION THERAPY | Facility: HOSPITAL | Age: 49
End: 2024-03-27

## 2024-03-27 ENCOUNTER — NON-APPOINTMENT (OUTPATIENT)
Age: 49
End: 2024-03-27

## 2024-03-27 VITALS
DIASTOLIC BLOOD PRESSURE: 70 MMHG | SYSTOLIC BLOOD PRESSURE: 107 MMHG | RESPIRATION RATE: 16 BRPM | WEIGHT: 123.46 LBS | BODY MASS INDEX: 20.54 KG/M2 | HEART RATE: 82 BPM | OXYGEN SATURATION: 96 % | TEMPERATURE: 97.2 F

## 2024-03-27 DIAGNOSIS — R53.83 OTHER FATIGUE: ICD-10-CM

## 2024-03-27 LAB
BASOPHILS # BLD AUTO: 0 K/UL — SIGNIFICANT CHANGE UP (ref 0–0.2)
BASOPHILS NFR BLD AUTO: 0 % — SIGNIFICANT CHANGE UP (ref 0–2)
EOSINOPHIL # BLD AUTO: 0.05 K/UL — SIGNIFICANT CHANGE UP (ref 0–0.5)
EOSINOPHIL NFR BLD AUTO: 3 % — SIGNIFICANT CHANGE UP (ref 0–6)
HCT VFR BLD CALC: 34.8 % — SIGNIFICANT CHANGE UP (ref 34.5–45)
HGB BLD-MCNC: 11.7 G/DL — SIGNIFICANT CHANGE UP (ref 11.5–15.5)
LYMPHOCYTES # BLD AUTO: 0.4 K/UL — LOW (ref 1–3.3)
LYMPHOCYTES # BLD AUTO: 24 % — SIGNIFICANT CHANGE UP (ref 13–44)
MCHC RBC-ENTMCNC: 30.1 PG — SIGNIFICANT CHANGE UP (ref 27–34)
MCHC RBC-ENTMCNC: 33.6 G/DL — SIGNIFICANT CHANGE UP (ref 32–36)
MCV RBC AUTO: 89.5 FL — SIGNIFICANT CHANGE UP (ref 80–100)
MONOCYTES # BLD AUTO: 0.03 K/UL — SIGNIFICANT CHANGE UP (ref 0–0.9)
MONOCYTES NFR BLD AUTO: 2 % — SIGNIFICANT CHANGE UP (ref 2–14)
NEUTROPHILS # BLD AUTO: 1.19 K/UL — LOW (ref 1.8–7.4)
NEUTROPHILS NFR BLD AUTO: 71 % — SIGNIFICANT CHANGE UP (ref 43–77)
NRBC # BLD: 0 /100 WBCS — SIGNIFICANT CHANGE UP (ref 0–0)
NRBC # BLD: SIGNIFICANT CHANGE UP /100 WBCS (ref 0–0)
PLAT MORPH BLD: NORMAL — SIGNIFICANT CHANGE UP
PLATELET # BLD AUTO: 119 K/UL — LOW (ref 150–400)
RBC # BLD: 3.89 M/UL — SIGNIFICANT CHANGE UP (ref 3.8–5.2)
RBC # FLD: 12.2 % — SIGNIFICANT CHANGE UP (ref 10.3–14.5)
RBC BLD AUTO: SIGNIFICANT CHANGE UP
WBC # BLD: 1.67 K/UL — LOW (ref 3.8–10.5)
WBC # FLD AUTO: 1.67 K/UL — LOW (ref 3.8–10.5)

## 2024-03-27 PROCEDURE — 99417 PROLNG OP E/M EACH 15 MIN: CPT

## 2024-03-27 PROCEDURE — 99215 OFFICE O/P EST HI 40 MIN: CPT

## 2024-03-27 PROCEDURE — 93010 ELECTROCARDIOGRAM REPORT: CPT

## 2024-03-27 PROCEDURE — G2212 PROLONG OUTPT/OFFICE VIS: CPT

## 2024-03-28 ENCOUNTER — NON-APPOINTMENT (OUTPATIENT)
Age: 49
End: 2024-03-28

## 2024-03-28 DIAGNOSIS — C79.51 SECONDARY MALIGNANT NEOPLASM OF BONE: ICD-10-CM

## 2024-03-29 LAB
ALBUMIN SERPL ELPH-MCNC: 4 G/DL
ALP BLD-CCNC: 83 U/L
ALT SERPL-CCNC: 9 U/L
ANION GAP SERPL CALC-SCNC: 10 MMOL/L
AST SERPL-CCNC: 17 U/L
BILIRUB SERPL-MCNC: 0.3 MG/DL
BUN SERPL-MCNC: 11 MG/DL
CALCIUM SERPL-MCNC: 9.2 MG/DL
CANCER AG27-29 SERPL-ACNC: 171.4 U/ML
CEA SERPL-MCNC: 5.6 NG/ML
CHLORIDE SERPL-SCNC: 103 MMOL/L
CO2 SERPL-SCNC: 28 MMOL/L
CREAT SERPL-MCNC: 0.95 MG/DL
EGFR: 73 ML/MIN/1.73M2
GLUCOSE SERPL-MCNC: 85 MG/DL
POTASSIUM SERPL-SCNC: 4.3 MMOL/L
PROT SERPL-MCNC: 6.5 G/DL
SODIUM SERPL-SCNC: 142 MMOL/L

## 2024-03-31 NOTE — PHYSICAL EXAM
[de-identified] : R leg lift 3/5; gait steady with help of the rolling walker  [de-identified] : b mastectomy; no skin nodules or palpable axillary LN

## 2024-03-31 NOTE — HISTORY OF PRESENT ILLNESS
[T: ___] : T[unfilled] [Disease: _____________________] : Disease: [unfilled] [N: ___] : N[unfilled] [AJCC Stage: ____] : AJCC Stage: [unfilled] [de-identified] : invasive ductal carcinoma ER positive, Her2 negative 2021; 2024: bx pending [de-identified] : Age 45: right breast cancer Palpable mass on self-exam: she had diagnostic mammogram on 11/25/2020 with the finding of a spiculated mass in the supra-areolar region of the right breast, mid level to the chest wall, with suspicious microcalcifications identified, extending for approximately 4 cm with a suggestion of retraction with extension to the anterior surface of the breast approximately 2 cm inferior to the mass, suspicious microcalcifications identified within the mass; there were scattered groups of calcifications also identified in the left breast with questionable areas of nodularity. A bilateral breast ultrasound was performed on that same date with a finding of a 4 cm spiculated mass in the right breast, 12 o'clock axis, 1 cm from the nipple, with a note that although other irregular areas of nodularity were present, tissue sampling of this mass was advised under ultrasound-guided core biopsy; the left breast had a 3 mm slightly irregular hypoechoic nodule at the 4 o'clock axis, 6 cm from the nipple with no area of suspicious architectural distortion or acoustical shadowing; no abnormal adenopathy was seen in either axilla. On 12/02/2020, the patient underwent a right breast 12 o'clock axis core biopsy with a finding of moderately differentiated invasive duct carcinoma with focal squamous differentiation, with core biopsy specimen measuring 1.2 cm, estrogen receptor positive (80%), progesterone receptor positive (30%), and HER-2/lou negative (1+ staining). She subsequently saw Dr. Flor Alfonso who then ordered a bilateral breast MRI which was performed on 12/15/2020 with a finding of a spiculated enhancing mass in the right upper central breast, middle depth which measured at least 3 x 4 cm corresponding to the biopsy-proven malignancy; there was nodule enhancement extending inferior, medial, and superior-lateral to the mass, which most likely represented a hematoma from the recent biopsy although satellite lesions could not be excluded; no evidence of contralateral breast disease was noted. She underwent a bilateral mastectomy, with the right mastectomy specimen showing 2 foci of invasive duct carcinoma, poorly differentiated, spanning 35 and 14 mm, respectively (the large focus demonstrated focal squamous differentiation), Mia-SBR score 9, margins negative, with non-extensive DCIS noted, evidence of lympho-vascular invasion was identified, and 1/1 sentinel lymph node with a micro-metastasis (1.6 mm), and 1/1 sentinel lymph node returning negative for metastasis. She had multiple oncology opinions: OncotypeDX returned with a RS 31 = 24% ROR at 9 years with GUTIERREZ or AI alone and a group average absolute chemotherapy benefit of ~ 15%. She underwent DD AC>>T 3/2021 to 6/2021 followed by RT to the chest wall. She started on anastrozole 11/2021.  Age 48: had symptomatic hip pain and had MRI of the hip done 1/19/2024 which showed extensive bony metastases largest in the R iliac bone/ acetabulum. She had Pet/ CT done on 1/31/2024 which showed multiple FDG avid lucencies in the axial skeleton compatible with osseous metastases SUV 12.8 at the iliac R; 3.9 in the T5 L transverse process, She had bone biopsy done on the R iliac which was positive for malignant cells: ER 95%, RI 0%, Her2 negative (1). She started palliative RT to the R pelvic region. She started on Kisqali and Faslodex 3/12/2024. She had genomic testing done 3/2024 in [facility].   [de-identified] : [employer] RNA Insight testing which returned with no pathogenic mutation 2020 ddACT 3 to 6/2021 RT 8/2021 anastrozole 11/2021 to 1/2024 Kisqali/ Faslodex 3/12/2024 to present  genomic testing done: PIK3CA, PTEN, FGFR1 mutation  denosumab 3/27/2024 [de-identified] : She has been having persistent pain over the R hip area: using rolling walker. Had been hoping that the hip pain would be better for now. There was one good day where she was able to go up and down stairs and felt well. Since then, she feels the hip still bothers her. She is using the rolling walker for ambulation. Has numbness over the back of the thigh areas: not sure if started before or after the fulvestrant. She denies any GI upset or nausea on the Kisqali. She denies any fevers or chills. Has therapist which she sees once a week. She received first dose of denosumab today: will be reimbursed by the [facility] [employer]. She is anxious whether or not to ask her family to help her pay for denosumab. Sees therapist once a week and also sees therapist here.

## 2024-03-31 NOTE — REASON FOR VISIT
[Follow-Up Visit] : a follow-up [FreeTextEntry2] : follow up for metastatic breast cancer to the bones on C1D15 of Kisqali and Faslodex

## 2024-03-31 NOTE — ASSESSMENT
[FreeTextEntry1] : She is a 49 y/o BRCA negative  F who had Stage II right invasive ductal carcinoma s/p ddACT followed by RT and had been on anastrozole since 2021. We reviewed her R iliac bone biopsy that is consistent with metastatic breast cancer to the bones and ER positive, [place] negative, Her2 negative. She is currently C1 D15 of Faslodex and Kisqali: currently ANC over 1 K;microL and has grade 2 neutropenia. Will continue to monitor counts. Will repeat on C1 D28 to see if counts recover: reviewed may need more than 1 week off kisqali for count recovery. Explained potential GI AE and fatigue/ asthenia that may also affect QOL and may be another reason for dose adjustment. Currently will continue with 600mg dose. Reviewed EKG: QT interval WNL. She will continue with palliative therapy. Will have follow up on C1D28 to check counts and EKG. We reviewed genomic testing from [employer]: currently she is a candidate for Pik3A agents but no studies with Kisqali/ fulvestrant. Awaiting to see if INAVO study with inavolisib approved. Reviewed genomic testing and future treatments/ clinical trials. Written copy of the report given to her.   Bone mets: reviewed denosumab once every 28 days: insurance has denied claim. She has an advocate group [employer] who will help her pay for first dose. Explained that appeal is pending with insurance and reviewed her questions and concerns.   Anxiety: we reviewed support system: she currently has therapist and has been seen by SW. We reviewed trying to increase therapist sessions.

## 2024-03-31 NOTE — REVIEW OF SYSTEMS
[Muscle Weakness] : no muscle weakness [Joint Stiffness] : no joint stiffness [Muscle Pain] : no muscle pain [Suicidal] : not suicidal [Insomnia] : no insomnia [Depression] : no depression [Negative] : Musculoskeletal [FreeTextEntry9] : R hip and pelvis pain

## 2024-04-01 NOTE — REASON FOR VISIT
[Post-Treatment Evaluation] : post-treatment evaluation for [Bone Metastasis] : bone metastasis [Breast Cancer] : breast cancer

## 2024-04-04 ENCOUNTER — NON-APPOINTMENT (OUTPATIENT)
Age: 49
End: 2024-04-04

## 2024-04-08 ENCOUNTER — APPOINTMENT (OUTPATIENT)
Dept: RADIATION ONCOLOGY | Facility: CLINIC | Age: 49
End: 2024-04-08
Payer: COMMERCIAL

## 2024-04-08 PROCEDURE — 99024 POSTOP FOLLOW-UP VISIT: CPT

## 2024-04-08 RX ORDER — DEXAMETHASONE 4 MG/1
4 TABLET ORAL DAILY
Qty: 15 | Refills: 0 | Status: DISCONTINUED | COMMUNITY
Start: 2024-02-29 | End: 2024-04-08

## 2024-04-08 NOTE — HISTORY OF PRESENT ILLNESS
[Home] : at home, [unfilled] , at the time of the visit. [Medical Office: ([facility])___] : at the medical office located in  [Verbal consent obtained from patient] : the patient, [unfilled] [FreeTextEntry1] : Ms. Lerman is a 49 yo female who has a history of stage IIA breast cancer treated with definitive intent in 2021, now with metastatic disease. She completed palliative radiation therapy to the right pelvic region, a dose of 2000 cGy in 5 fractions from 2/22/24 - 2/27/24. She presents via telehealth for a post-treatment evaluation.  She continues to have pain in her right hip and does not feel that there was any significant improvement.  She developed worsening lower back pain, at the mid-line and non-radiating, aggravated by any time of movement especially bending at the waist.  She is using a walker but continues to limp due to the hip discomfort.  She is having difficulty with ADL's, as the bending to do laundry, cooking etc, causes her pain and needs to rest for 30 minutes or so.  She cannot take dexamethasone, as she had intolerable side effects, no sleep and restless.  She takes Alleve in the morning and one Tylenol later in the day.   Extensive osseous metastases seen on 3/21/24 MRI scans of the spine, with marrow replacing lesions associated with abnormal enhancement throughout the spine, including the spinous process of T11 and left sided pedicle of L5.   She started treatment with faslodex and Kisqali last month. She thought the lower back pain might be due to the Xgeva, but the pain continues after about two weeks.

## 2024-04-08 NOTE — VITALS
[Maximal Pain Intensity: 8/10] : 8/10 [Least Pain Intensity: 0/10] : 0/10 [Pain Description/Quality: ___] : Pain description/quality: [unfilled] [Pain Duration: ___] : Pain duration: [unfilled] [Pain Location: ___] : Pain Location: [unfilled] [Pain Interferes with ADLs] : Pain interferes with activities of daily living. [OTC] : OTC [NSAID/Non-Opioid] : NSAID/Non-Opioid [70: Cares for self; unalbe to carry on normal activity or do active work.] : 70: Cares for self; unable to carry on normal activity or do active work.

## 2024-04-08 NOTE — REVIEW OF SYSTEMS
[Fatigue] : fatigue [Gait Disturbance] : gait disturbance [Anxiety] : anxiety [Depression] : depression [Negative] : Allergic/Immunologic [Fever] : no fever [Skin Rash] : no skin rash [Suicidal] : not suicidal [FreeTextEntry9] : back and hip pain as above

## 2024-04-10 ENCOUNTER — RESULT REVIEW (OUTPATIENT)
Age: 49
End: 2024-04-10

## 2024-04-10 ENCOUNTER — APPOINTMENT (OUTPATIENT)
Dept: HEMATOLOGY ONCOLOGY | Facility: CLINIC | Age: 49
End: 2024-04-10
Payer: COMMERCIAL

## 2024-04-10 ENCOUNTER — NON-APPOINTMENT (OUTPATIENT)
Age: 49
End: 2024-04-10

## 2024-04-10 ENCOUNTER — APPOINTMENT (OUTPATIENT)
Dept: INFUSION THERAPY | Facility: HOSPITAL | Age: 49
End: 2024-04-10

## 2024-04-10 VITALS
DIASTOLIC BLOOD PRESSURE: 66 MMHG | SYSTOLIC BLOOD PRESSURE: 102 MMHG | WEIGHT: 124.56 LBS | TEMPERATURE: 97.9 F | HEART RATE: 71 BPM | OXYGEN SATURATION: 98 % | BODY MASS INDEX: 20.73 KG/M2 | RESPIRATION RATE: 16 BRPM

## 2024-04-10 LAB
ALBUMIN SERPL ELPH-MCNC: 4.2 G/DL
ALP BLD-CCNC: 90 U/L
ALT SERPL-CCNC: 8 U/L
ANION GAP SERPL CALC-SCNC: 8 MMOL/L
AST SERPL-CCNC: 18 U/L
BASOPHILS # BLD AUTO: 0.05 K/UL — SIGNIFICANT CHANGE UP (ref 0–0.2)
BASOPHILS NFR BLD AUTO: 2 % — SIGNIFICANT CHANGE UP (ref 0–2)
BILIRUB SERPL-MCNC: 0.2 MG/DL
BUN SERPL-MCNC: 9 MG/DL
CALCIUM SERPL-MCNC: 9.4 MG/DL
CHLORIDE SERPL-SCNC: 104 MMOL/L
CO2 SERPL-SCNC: 27 MMOL/L
CREAT SERPL-MCNC: 0.68 MG/DL
DACRYOCYTES BLD QL SMEAR: SLIGHT — SIGNIFICANT CHANGE UP
EGFR: 106 ML/MIN/1.73M2
ELLIPTOCYTES BLD QL SMEAR: SLIGHT — SIGNIFICANT CHANGE UP
EOSINOPHIL # BLD AUTO: 0.09 K/UL — SIGNIFICANT CHANGE UP (ref 0–0.5)
EOSINOPHIL NFR BLD AUTO: 4 % — SIGNIFICANT CHANGE UP (ref 0–6)
GLUCOSE SERPL-MCNC: 87 MG/DL
HCT VFR BLD CALC: 34.7 % — SIGNIFICANT CHANGE UP (ref 34.5–45)
HGB BLD-MCNC: 11.8 G/DL — SIGNIFICANT CHANGE UP (ref 11.5–15.5)
LYMPHOCYTES # BLD AUTO: 0.7 K/UL — LOW (ref 1–3.3)
LYMPHOCYTES # BLD AUTO: 31 % — SIGNIFICANT CHANGE UP (ref 13–44)
MCHC RBC-ENTMCNC: 30.2 PG — SIGNIFICANT CHANGE UP (ref 27–34)
MCHC RBC-ENTMCNC: 34 G/DL — SIGNIFICANT CHANGE UP (ref 32–36)
MCV RBC AUTO: 88.7 FL — SIGNIFICANT CHANGE UP (ref 80–100)
MONOCYTES # BLD AUTO: 0.61 K/UL — SIGNIFICANT CHANGE UP (ref 0–0.9)
MONOCYTES NFR BLD AUTO: 27 % — HIGH (ref 2–14)
NEUTROPHILS # BLD AUTO: 0.81 K/UL — LOW (ref 1.8–7.4)
NEUTROPHILS NFR BLD AUTO: 36 % — LOW (ref 43–77)
NRBC # BLD: 0 /100 WBCS — SIGNIFICANT CHANGE UP (ref 0–0)
NRBC # BLD: SIGNIFICANT CHANGE UP /100 WBCS (ref 0–0)
PLAT MORPH BLD: NORMAL — SIGNIFICANT CHANGE UP
PLATELET # BLD AUTO: 130 K/UL — LOW (ref 150–400)
POIKILOCYTOSIS BLD QL AUTO: SLIGHT — SIGNIFICANT CHANGE UP
POTASSIUM SERPL-SCNC: 4.8 MMOL/L
PROT SERPL-MCNC: 6.7 G/DL
RBC # BLD: 3.91 M/UL — SIGNIFICANT CHANGE UP (ref 3.8–5.2)
RBC # FLD: 13.9 % — SIGNIFICANT CHANGE UP (ref 10.3–14.5)
RBC BLD AUTO: ABNORMAL
SODIUM SERPL-SCNC: 140 MMOL/L
WBC # BLD: 2.26 K/UL — LOW (ref 3.8–10.5)
WBC # FLD AUTO: 2.26 K/UL — LOW (ref 3.8–10.5)

## 2024-04-10 PROCEDURE — 99214 OFFICE O/P EST MOD 30 MIN: CPT

## 2024-04-10 PROCEDURE — 93010 ELECTROCARDIOGRAM REPORT: CPT

## 2024-04-10 NOTE — REASON FOR VISIT
[Follow-Up Visit] : a follow-up [FreeTextEntry2] : follow up for metastatic breast cancer to the bones on C1D28 of Kisqali and Faslodex

## 2024-04-10 NOTE — PHYSICAL EXAM
[Restricted in physically strenuous activity but ambulatory and able to carry out work of a light or sedentary nature] : Status 1- Restricted in physically strenuous activity but ambulatory and able to carry out work of a light or sedentary nature, e.g., light house work, office work [Normal] : affect appropriate [de-identified] : b mastectomy; no skin nodules or palpable axillary LN  [de-identified] : R leg lift 3/5; gait steady with help of the rolling walker

## 2024-04-10 NOTE — HISTORY OF PRESENT ILLNESS
[Disease: _____________________] : Disease: [unfilled] [T: ___] : T[unfilled] [N: ___] : N[unfilled] [AJCC Stage: ____] : AJCC Stage: [unfilled] [de-identified] : Age 45: right breast cancer Palpable mass on self-exam: she had diagnostic mammogram on 11/25/2020 with the finding of a spiculated mass in the supra-areolar region of the right breast, mid level to the chest wall, with suspicious microcalcifications identified, extending for approximately 4 cm with a suggestion of retraction with extension to the anterior surface of the breast approximately 2 cm inferior to the mass, suspicious microcalcifications identified within the mass; there were scattered groups of calcifications also identified in the left breast with questionable areas of nodularity. A bilateral breast ultrasound was performed on that same date with a finding of a 4 cm spiculated mass in the right breast, 12 o'clock axis, 1 cm from the nipple, with a note that although other irregular areas of nodularity were present, tissue sampling of this mass was advised under ultrasound-guided core biopsy; the left breast had a 3 mm slightly irregular hypoechoic nodule at the 4 o'clock axis, 6 cm from the nipple with no area of suspicious architectural distortion or acoustical shadowing; no abnormal adenopathy was seen in either axilla. On 12/02/2020, the patient underwent a right breast 12 o'clock axis core biopsy with a finding of moderately differentiated invasive duct carcinoma with focal squamous differentiation, with core biopsy specimen measuring 1.2 cm, estrogen receptor positive (80%), progesterone receptor positive (30%), and HER-2/lou negative (1+ staining). She subsequently saw Dr. Flor Alfonso who then ordered a bilateral breast MRI which was performed on 12/15/2020 with a finding of a spiculated enhancing mass in the right upper central breast, middle depth which measured at least 3 x 4 cm corresponding to the biopsy-proven malignancy; there was nodule enhancement extending inferior, medial, and superior-lateral to the mass, which most likely represented a hematoma from the recent biopsy although satellite lesions could not be excluded; no evidence of contralateral breast disease was noted. She underwent a bilateral mastectomy, with the right mastectomy specimen showing 2 foci of invasive duct carcinoma, poorly differentiated, spanning 35 and 14 mm, respectively (the large focus demonstrated focal squamous differentiation), Mia-SBR score 9, margins negative, with non-extensive DCIS noted, evidence of lympho-vascular invasion was identified, and 1/1 sentinel lymph node with a micro-metastasis (1.6 mm), and 1/1 sentinel lymph node returning negative for metastasis. She had multiple oncology opinions: OncotypeDX returned with a RS 31 = 24% ROR at 7 years with GUTIERREZ or AI alone and a group average absolute chemotherapy benefit of ~ 15%. She underwent DD AC>>T 3/2021 to 6/2021 followed by RT to the chest wall. She started on anastrozole 11/2021.  Age 48: had symptomatic hip pain and had MRI of the hip done 1/19/2024 which showed extensive bony metastases largest in the R iliac bone/ acetabulum. She had Pet/ CT done on 1/31/2024 which showed multiple FDG avid lucencies in the axial skeleton compatible with osseous metastases SUV 12.8 at the iliac R; 3.9 in the T5 L transverse process, She had bone biopsy done on the R iliac which was positive for malignant cells: ER 95%, ND 0%, Her2 negative (1). She started palliative RT to the R pelvic region. She started on Kisqali and Faslodex 3/12/2024. She had genomic testing done 3/2024 in [facility].   [de-identified] : invasive ductal carcinoma ER positive, Her2 negative 2021; 2024: bx pending [de-identified] : [employer] RNA Insight testing which returned with no pathogenic mutation 2020 ddACT 3 to 6/2021 RT 8/2021 anastrozole 11/2021 to 1/2024 Kisqali/ Faslodex 3/12/2024 to present  genomic testing done: PIK3CA, PTEN, FGFR1 mutation  denosumab 3/27/2024 [de-identified] : She had worsening lower back pain earlier this week and believes it might be due to use of walker. She stopped using walker and noticed back pain slightly improved. She has been taking Tylenol or Aleve PRN with partial relief of symptoms. She is considering use of other assistive devices such as a cane. She feels her energy and appetite have remained stable and she has regular bowel movements. She denies fevers or chills.

## 2024-04-10 NOTE — REVIEW OF SYSTEMS
[Diarrhea: Grade 0] : Diarrhea: Grade 0 [Joint Pain] : joint pain [Anxiety] : anxiety [Negative] : Allergic/Immunologic [Joint Stiffness] : no joint stiffness [Muscle Pain] : no muscle pain [Muscle Weakness] : no muscle weakness [Suicidal] : not suicidal [Insomnia] : no insomnia [Depression] : no depression [FreeTextEntry9] : R hip and pelvis pain; back pain

## 2024-04-10 NOTE — END OF VISIT
[Time Spent: ___ minutes] : I have spent [unfilled] minutes of time on the encounter. [FreeTextEntry3] : Patient being seen per physician's primary plan of care.

## 2024-04-10 NOTE — ASSESSMENT
[FreeTextEntry1] : She is a 47 y/o BRCA negative  F who had Stage II right invasive ductal carcinoma s/p ddACT followed by RT and had been on anastrozole since 2021. She had R iliac bone biopsy that is consistent with metastatic breast cancer to the bones and ER positive, [place] negative, Her2 negative. She is currently C1 D28 of Faslodex and Kisqali: ANC 0.81 K;microL and has grade 3 neutropenia. She will have repeat CBC 04/15/24 and will go to local [facility] lab. We reviewed she will need more than 1 week off Kisqali for count recovery. Reviewed EKG: QT interval WNL. Questions answered to her satisfaction. She is agreeable with plan. Will follow up with results of repeat CBC next week.   Bone mets: Reviewed denosumab once every 28 days: insurance has denied claim. She had an advocate group [employer] who helped her pay for first dose. Will delay next denosumab injection by two weeks so that denosumab/Faslodex can be given the same day every 28 days moving forward.   Anxiety: She will continue to follow up with therapist.

## 2024-04-11 ENCOUNTER — NON-APPOINTMENT (OUTPATIENT)
Age: 49
End: 2024-04-11

## 2024-04-12 ENCOUNTER — NON-APPOINTMENT (OUTPATIENT)
Age: 49
End: 2024-04-12

## 2024-04-16 ENCOUNTER — NON-APPOINTMENT (OUTPATIENT)
Age: 49
End: 2024-04-16

## 2024-04-16 LAB
BASOPHILS # BLD AUTO: 0.07 K/UL
BASOPHILS NFR BLD AUTO: 2.1 %
EOSINOPHIL # BLD AUTO: 0.07 K/UL
EOSINOPHIL NFR BLD AUTO: 2.1 %
HCT VFR BLD CALC: 37.5 %
HGB BLD-MCNC: 12.5 G/DL
IMM GRANULOCYTES NFR BLD AUTO: 0.6 %
LYMPHOCYTES # BLD AUTO: 0.76 K/UL
LYMPHOCYTES NFR BLD AUTO: 22.9 %
MAN DIFF?: NORMAL
MCHC RBC-ENTMCNC: 30 PG
MCHC RBC-ENTMCNC: 33.3 GM/DL
MCV RBC AUTO: 89.9 FL
MONOCYTES # BLD AUTO: 0.53 K/UL
MONOCYTES NFR BLD AUTO: 16 %
NEUTROPHILS # BLD AUTO: 1.87 K/UL
NEUTROPHILS NFR BLD AUTO: 56.3 %
PLATELET # BLD AUTO: 232 K/UL
RBC # BLD: 4.17 M/UL
RBC # FLD: 14.8 %
WBC # FLD AUTO: 3.32 K/UL

## 2024-04-18 ENCOUNTER — NON-APPOINTMENT (OUTPATIENT)
Age: 49
End: 2024-04-18

## 2024-04-19 LAB — CANCER AG27-29 SERPL-ACNC: 268.1 U/ML

## 2024-04-24 ENCOUNTER — APPOINTMENT (OUTPATIENT)
Dept: INFUSION THERAPY | Facility: HOSPITAL | Age: 49
End: 2024-04-24

## 2024-05-01 ENCOUNTER — LABORATORY RESULT (OUTPATIENT)
Age: 49
End: 2024-05-01

## 2024-05-02 DIAGNOSIS — R25.1 TREMOR, UNSPECIFIED: ICD-10-CM

## 2024-05-03 ENCOUNTER — OUTPATIENT (OUTPATIENT)
Dept: OUTPATIENT SERVICES | Facility: HOSPITAL | Age: 49
LOS: 1 days | Discharge: HOME | End: 2024-05-03
Payer: COMMERCIAL

## 2024-05-03 DIAGNOSIS — R11.0 NAUSEA: ICD-10-CM

## 2024-05-03 DIAGNOSIS — Z98.890 OTHER SPECIFIED POSTPROCEDURAL STATES: Chronic | ICD-10-CM

## 2024-05-03 DIAGNOSIS — C50.911 MALIGNANT NEOPLASM OF UNSPECIFIED SITE OF RIGHT FEMALE BREAST: ICD-10-CM

## 2024-05-03 DIAGNOSIS — Z90.49 ACQUIRED ABSENCE OF OTHER SPECIFIED PARTS OF DIGESTIVE TRACT: Chronic | ICD-10-CM

## 2024-05-03 LAB
HCT VFR BLD CALC: 36.9 %
HGB BLD-MCNC: 12 G/DL
MCHC RBC-ENTMCNC: 30.2 PG
MCHC RBC-ENTMCNC: 32.5 GM/DL
MCV RBC AUTO: 92.7 FL
PLATELET # BLD AUTO: 160 K/UL
RBC # BLD: 3.98 M/UL
RBC # FLD: 15.3 %
WBC # FLD AUTO: 2.11 K/UL

## 2024-05-03 RX ORDER — PROCHLORPERAZINE MALEATE 10 MG/1
10 TABLET ORAL EVERY 6 HOURS
Qty: 30 | Refills: 1 | Status: ACTIVE | COMMUNITY
Start: 2024-05-03 | End: 1900-01-01

## 2024-05-07 ENCOUNTER — NON-APPOINTMENT (OUTPATIENT)
Age: 49
End: 2024-05-07

## 2024-05-08 ENCOUNTER — APPOINTMENT (OUTPATIENT)
Dept: HEMATOLOGY ONCOLOGY | Facility: CLINIC | Age: 49
End: 2024-05-08
Payer: COMMERCIAL

## 2024-05-08 ENCOUNTER — APPOINTMENT (OUTPATIENT)
Dept: INFUSION THERAPY | Facility: HOSPITAL | Age: 49
End: 2024-05-08

## 2024-05-08 VITALS
WEIGHT: 122.33 LBS | BODY MASS INDEX: 20.36 KG/M2 | DIASTOLIC BLOOD PRESSURE: 65 MMHG | TEMPERATURE: 97.9 F | HEART RATE: 71 BPM | SYSTOLIC BLOOD PRESSURE: 103 MMHG | RESPIRATION RATE: 16 BRPM | OXYGEN SATURATION: 98 %

## 2024-05-08 DIAGNOSIS — M25.551 PAIN IN RIGHT HIP: ICD-10-CM

## 2024-05-08 PROCEDURE — 99215 OFFICE O/P EST HI 40 MIN: CPT

## 2024-05-08 PROCEDURE — 93010 ELECTROCARDIOGRAM REPORT: CPT

## 2024-05-08 RX ORDER — RIBOCICLIB 200 MG/1
200 TABLET, FILM COATED ORAL
Qty: 63 | Refills: 2 | Status: ACTIVE | COMMUNITY
Start: 2024-02-26 | End: 1900-01-01

## 2024-05-09 DIAGNOSIS — C79.51 SECONDARY MALIGNANT NEOPLASM OF BONE: ICD-10-CM

## 2024-05-10 PROBLEM — M25.551 ACUTE PAIN OF RIGHT HIP: Status: ACTIVE | Noted: 2024-01-31

## 2024-05-10 NOTE — REVIEW OF SYSTEMS
[Diarrhea: Grade 0] : Diarrhea: Grade 0 [Negative] : Allergic/Immunologic [Fever] : no fever [Chills] : no chills [Night Sweats] : no night sweats [Fatigue] : fatigue [Recent Change In Weight] : ~T no recent weight change [Abdominal Pain] : no abdominal pain [Vomiting] : no vomiting [Constipation] : no constipation [Joint Pain] : joint pain [Joint Stiffness] : joint stiffness [Muscle Pain] : no muscle pain [Muscle Weakness] : no muscle weakness [Confused] : no confusion [Dizziness] : no dizziness [Fainting] : no fainting [Difficulty Walking] : no difficulty walking [Suicidal] : not suicidal [Insomnia] : no insomnia [Anxiety] : anxiety [Depression] : no depression [FreeTextEntry7] : nausea  [de-identified] : tremors both hands comes and goes

## 2024-05-10 NOTE — PHYSICAL EXAM
[Restricted in physically strenuous activity but ambulatory and able to carry out work of a light or sedentary nature] : Status 1- Restricted in physically strenuous activity but ambulatory and able to carry out work of a light or sedentary nature, e.g., light house work, office work [Normal] : affect appropriate [de-identified] : b mastectomy; no skin nodules or palpable axillary LN  [de-identified] : R leg lift 3/5; gait steady with help of the rolling walker

## 2024-05-10 NOTE — HISTORY OF PRESENT ILLNESS
[Disease: _____________________] : Disease: [unfilled] [T: ___] : T[unfilled] [N: ___] : N[unfilled] [AJCC Stage: ____] : AJCC Stage: [unfilled] [de-identified] : Age 45: right breast cancer Palpable mass on self-exam: she had diagnostic mammogram on 11/25/2020 with the finding of a spiculated mass in the supra-areolar region of the right breast, mid level to the chest wall, with suspicious microcalcifications identified, extending for approximately 4 cm with a suggestion of retraction with extension to the anterior surface of the breast approximately 2 cm inferior to the mass, suspicious microcalcifications identified within the mass; there were scattered groups of calcifications also identified in the left breast with questionable areas of nodularity. A bilateral breast ultrasound was performed on that same date with a finding of a 4 cm spiculated mass in the right breast, 12 o'clock axis, 1 cm from the nipple, with a note that although other irregular areas of nodularity were present, tissue sampling of this mass was advised under ultrasound-guided core biopsy; the left breast had a 3 mm slightly irregular hypoechoic nodule at the 4 o'clock axis, 6 cm from the nipple with no area of suspicious architectural distortion or acoustical shadowing; no abnormal adenopathy was seen in either axilla. On 12/02/2020, the patient underwent a right breast 12 o'clock axis core biopsy with a finding of moderately differentiated invasive duct carcinoma with focal squamous differentiation, with core biopsy specimen measuring 1.2 cm, estrogen receptor positive (80%), progesterone receptor positive (30%), and HER-2/lou negative (1+ staining). She subsequently saw Dr. Flor Alfonso who then ordered a bilateral breast MRI which was performed on 12/15/2020 with a finding of a spiculated enhancing mass in the right upper central breast, middle depth which measured at least 3 x 4 cm corresponding to the biopsy-proven malignancy; there was nodule enhancement extending inferior, medial, and superior-lateral to the mass, which most likely represented a hematoma from the recent biopsy although satellite lesions could not be excluded; no evidence of contralateral breast disease was noted. She underwent a bilateral mastectomy, with the right mastectomy specimen showing 2 foci of invasive duct carcinoma, poorly differentiated, spanning 35 and 14 mm, respectively (the large focus demonstrated focal squamous differentiation), Mia-SBR score 9, margins negative, with non-extensive DCIS noted, evidence of lympho-vascular invasion was identified, and 1/1 sentinel lymph node with a micro-metastasis (1.6 mm), and 1/1 sentinel lymph node returning negative for metastasis. She had multiple oncology opinions: OncotypeDX returned with a RS 31 = 24% ROR at 7 years with GUTIERREZ or AI alone and a group average absolute chemotherapy benefit of ~ 15%. She underwent DD AC>>T 3/2021 to 6/2021 followed by RT to the chest wall. She started on anastrozole 11/2021.  Age 48: had symptomatic hip pain and had MRI of the hip done 1/19/2024 which showed extensive bony metastases largest in the R iliac bone/ acetabulum. She had Pet/ CT done on 1/31/2024 which showed multiple FDG avid lucencies in the axial skeleton compatible with osseous metastases SUV 12.8 at the iliac R; 3.9 in the T5 L transverse process, She had bone biopsy done on the R iliac which was positive for malignant cells: ER 95%, HI 0%, Her2 negative (1). She started palliative RT to the R pelvic region. She started on Kisqali and Faslodex 3/12/2024. She had genomic testing done 3/2024 in [facility] [place]. She completed RT to the R pelvic region with Dr Ibrahim 2/2024.   [de-identified] : invasive ductal carcinoma ER positive, Her2 negative 2021; 2024: bx pending [de-identified] : [employer] RNA Insight testing which returned with no pathogenic mutation 2020 ddACT 3 to 6/2021 RT 8/2021 anastrozole 11/2021 to 1/2024 Kisqali/ Faslodex 3/12/2024 to present  genomic testing done: PIK3CA, PTEN, FGFR1 mutation  denosumab 3/27/2024 [de-identified] : Since last evaluation: she is currently taking the 2nd pack of Kisqali and noticing more nausea. She tries to eat less food to decrease nausea. Has good days where she feels she can walk without any trouble and feels she may overdo with housework and then starts to have lower back pain. She is tolerating denosumab and currently wants to continue even though she is paying out of pocket for the denosumab. Reviewed zolendronic acid options. She will have CBC next week to confirm week 4 counts. She denies any fevers or chills. Tremors occasionally and no difference if using hands or at rest. She denies any HA or other weakness of the arms or legs.

## 2024-05-10 NOTE — ASSESSMENT
[FreeTextEntry1] : She is a 47 y/o BRCA negative  F who had Stage II right invasive ductal carcinoma s/p ddACT followed by RT and had been on anastrozole since 2021. We reviewed her R iliac bone biopsy that is consistent with metastatic breast cancer to the bones and ER positive, [place] negative, Her2 negative. She is currently C1 D15 of Faslodex and Kisqali: currently ANC over 1 K;microL and has grade 2 neutropenia. Will continue to monitor counts. Will repeat on C1 D28 to see if counts recover: reviewed may need more than 1 week off kisqali for count recovery. Explained potential GI AE and fatigue/ asthenia that may also affect QOL and may be another reason for dose adjustment. Currently will continue with 600mg dose. Reviewed EKG: QT interval WNL. She will continue with palliative therapy.  CBC next week for C2 D28 to see if counts will recover to start C3. Reviewed that CBC would be monthly as long as no fevers or chills and tolerating therapy. We reviewed expectations with Kisqali and Faslodex and with response: we expect hip pain to continue to improve.   Bone mets: reviewed denosumab once every 28 days: insurance has denied claim. Prefers to pay out of pocket; reviewed zolendronic acid as option.   Tremor: most likely benign tremor: will obtain MRI brain given metastatic breast cancer. If negative. will consider neuro evaluation for tremor.  Anxiety: we reviewed support system: she currently has therapist and has been seen by SW. We reviewed trying to increase therapist sessions.

## 2024-05-10 NOTE — REASON FOR VISIT
[Follow-Up Visit] : a follow-up [FreeTextEntry2] : follow up for metastatic breast cancer to the bones

## 2024-05-14 ENCOUNTER — LABORATORY RESULT (OUTPATIENT)
Age: 49
End: 2024-05-14

## 2024-05-15 ENCOUNTER — NON-APPOINTMENT (OUTPATIENT)
Age: 49
End: 2024-05-15

## 2024-05-15 LAB
25(OH)D3 SERPL-MCNC: 28.1 NG/ML
ALBUMIN SERPL ELPH-MCNC: 4.2 G/DL
ALP BLD-CCNC: 70 U/L
ALT SERPL-CCNC: 7 U/L
ANION GAP SERPL CALC-SCNC: 10 MMOL/L
AST SERPL-CCNC: 17 U/L
BILIRUB SERPL-MCNC: 0.3 MG/DL
BUN SERPL-MCNC: 9 MG/DL
CALCIUM SERPL-MCNC: 9 MG/DL
CANCER AG27-29 SERPL-ACNC: 372.7 U/ML
CEA SERPL-MCNC: 4.7 NG/ML
CHLORIDE SERPL-SCNC: 104 MMOL/L
CO2 SERPL-SCNC: 26 MMOL/L
CREAT SERPL-MCNC: 0.73 MG/DL
EGFR: 100 ML/MIN/1.73M2
GLUCOSE SERPL-MCNC: 101 MG/DL
HCT VFR BLD CALC: 34.7 %
HGB BLD-MCNC: 11.9 G/DL
MCHC RBC-ENTMCNC: 31.2 PG
MCHC RBC-ENTMCNC: 34.3 GM/DL
MCV RBC AUTO: 91.1 FL
PLATELET # BLD AUTO: 117 K/UL
POTASSIUM SERPL-SCNC: 4.6 MMOL/L
PROT SERPL-MCNC: 6.5 G/DL
RBC # BLD: 3.81 M/UL
RBC # FLD: 16 %
SODIUM SERPL-SCNC: 140 MMOL/L
WBC # FLD AUTO: 1.65 K/UL

## 2024-05-20 ENCOUNTER — LABORATORY RESULT (OUTPATIENT)
Age: 49
End: 2024-05-20

## 2024-05-21 DIAGNOSIS — D70.2 OTHER DRUG-INDUCED AGRANULOCYTOSIS: ICD-10-CM

## 2024-05-21 LAB
HCT VFR BLD CALC: 35 %
HGB BLD-MCNC: 12 G/DL
MCHC RBC-ENTMCNC: 31.7 PG
MCHC RBC-ENTMCNC: 34.3 GM/DL
MCV RBC AUTO: 92.3 FL
PLATELET # BLD AUTO: 187 K/UL
RBC # BLD: 3.79 M/UL
RBC # FLD: 16.7 %
WBC # FLD AUTO: 2.5 K/UL

## 2024-05-22 ENCOUNTER — APPOINTMENT (OUTPATIENT)
Dept: HEMATOLOGY ONCOLOGY | Facility: CLINIC | Age: 49
End: 2024-05-22

## 2024-05-28 ENCOUNTER — RESULT REVIEW (OUTPATIENT)
Age: 49
End: 2024-05-28

## 2024-05-28 ENCOUNTER — APPOINTMENT (OUTPATIENT)
Dept: HEMATOLOGY ONCOLOGY | Facility: CLINIC | Age: 49
End: 2024-05-28

## 2024-05-28 LAB
BASOPHILS # BLD AUTO: 0.05 K/UL — SIGNIFICANT CHANGE UP (ref 0–0.2)
BASOPHILS NFR BLD AUTO: 1.4 % — SIGNIFICANT CHANGE UP (ref 0–2)
EOSINOPHIL # BLD AUTO: 0.12 K/UL — SIGNIFICANT CHANGE UP (ref 0–0.5)
EOSINOPHIL NFR BLD AUTO: 3.5 % — SIGNIFICANT CHANGE UP (ref 0–6)
HCT VFR BLD CALC: 36.8 % — SIGNIFICANT CHANGE UP (ref 34.5–45)
HGB BLD-MCNC: 12.7 G/DL — SIGNIFICANT CHANGE UP (ref 11.5–15.5)
IMM GRANULOCYTES NFR BLD AUTO: 0.6 % — SIGNIFICANT CHANGE UP (ref 0–0.9)
LYMPHOCYTES # BLD AUTO: 0.79 K/UL — LOW (ref 1–3.3)
LYMPHOCYTES # BLD AUTO: 22.9 % — SIGNIFICANT CHANGE UP (ref 13–44)
MCHC RBC-ENTMCNC: 31.4 PG — SIGNIFICANT CHANGE UP (ref 27–34)
MCHC RBC-ENTMCNC: 34.5 G/DL — SIGNIFICANT CHANGE UP (ref 32–36)
MCV RBC AUTO: 90.9 FL — SIGNIFICANT CHANGE UP (ref 80–100)
MONOCYTES # BLD AUTO: 0.43 K/UL — SIGNIFICANT CHANGE UP (ref 0–0.9)
MONOCYTES NFR BLD AUTO: 12.5 % — SIGNIFICANT CHANGE UP (ref 2–14)
NEUTROPHILS # BLD AUTO: 2.04 K/UL — SIGNIFICANT CHANGE UP (ref 1.8–7.4)
NEUTROPHILS NFR BLD AUTO: 59.1 % — SIGNIFICANT CHANGE UP (ref 43–77)
NRBC # BLD: 0 /100 WBCS — SIGNIFICANT CHANGE UP (ref 0–0)
PLATELET # BLD AUTO: 187 K/UL — SIGNIFICANT CHANGE UP (ref 150–400)
RBC # BLD: 4.05 M/UL — SIGNIFICANT CHANGE UP (ref 3.8–5.2)
RBC # FLD: 15.4 % — HIGH (ref 10.3–14.5)
WBC # BLD: 3.45 K/UL — LOW (ref 3.8–10.5)
WBC # FLD AUTO: 3.45 K/UL — LOW (ref 3.8–10.5)

## 2024-06-05 ENCOUNTER — APPOINTMENT (OUTPATIENT)
Dept: HEMATOLOGY ONCOLOGY | Facility: CLINIC | Age: 49
End: 2024-06-05
Payer: COMMERCIAL

## 2024-06-05 ENCOUNTER — APPOINTMENT (OUTPATIENT)
Dept: INFUSION THERAPY | Facility: HOSPITAL | Age: 49
End: 2024-06-05

## 2024-06-05 VITALS
WEIGHT: 120.15 LBS | HEART RATE: 86 BPM | TEMPERATURE: 97.3 F | OXYGEN SATURATION: 96 % | DIASTOLIC BLOOD PRESSURE: 65 MMHG | SYSTOLIC BLOOD PRESSURE: 98 MMHG | RESPIRATION RATE: 16 BRPM | BODY MASS INDEX: 19.99 KG/M2

## 2024-06-05 DIAGNOSIS — Z17.0 MALIGNANT NEOPLASM OF UNSPECIFIED SITE OF RIGHT FEMALE BREAST: ICD-10-CM

## 2024-06-05 DIAGNOSIS — C50.911 MALIGNANT NEOPLASM OF UNSPECIFIED SITE OF RIGHT FEMALE BREAST: ICD-10-CM

## 2024-06-05 DIAGNOSIS — M25.50 PAIN IN UNSPECIFIED JOINT: ICD-10-CM

## 2024-06-05 DIAGNOSIS — C79.51 SECONDARY MALIGNANT NEOPLASM OF BONE: ICD-10-CM

## 2024-06-05 DIAGNOSIS — T45.1X5A PAIN IN UNSPECIFIED JOINT: ICD-10-CM

## 2024-06-05 PROCEDURE — 99215 OFFICE O/P EST HI 40 MIN: CPT

## 2024-06-05 PROCEDURE — G2211 COMPLEX E/M VISIT ADD ON: CPT

## 2024-06-05 NOTE — ASSESSMENT
[FreeTextEntry1] : She is a 47 y/o BRCA negative  F who had Stage II right invasive ductal carcinoma s/p ddACT followed by RT and had been on anastrozole since 2021. We reviewed her R iliac bone biopsy that is consistent with metastatic breast cancer to the bones and ER positive, NH negative, Her2 negative. She is currently on Faslodex and Kisqali: prolonged 3 weeks off therapy due to Grade 3 neutropenia C2. We reviewed the rationale for 400 mg dose reduction and how we will monitor counts; we reviewed that if counts are good on the protein diet; we may then consider return to 600mg dose. We reviewed risk of prolonged neutropenia and precautions. Questions answered to her satisfaction. She is agreeable with plan. Pet/ CT for July to evaluate response to therapy. Next follow up in 1 month but earlier if any new symptoms  Bone mets: reviewed denosumab once every 28 days: she continues on therapy without any new dental issues or joint AE.   Tremor: MRI brain was negative for disease: will continue to monitor and follow symptoms: if recurs, will refer to neurology.   Anxiety: continue with alprazolam and support system.

## 2024-06-05 NOTE — HISTORY OF PRESENT ILLNESS
[Disease: _____________________] : Disease: [unfilled] [T: ___] : T[unfilled] [N: ___] : N[unfilled] [AJCC Stage: ____] : AJCC Stage: [unfilled] [de-identified] : Age 45: right breast cancer Palpable mass on self-exam: she had diagnostic mammogram on 11/25/2020 with the finding of a spiculated mass in the supra-areolar region of the right breast, mid level to the chest wall, with suspicious microcalcifications identified, extending for approximately 4 cm with a suggestion of retraction with extension to the anterior surface of the breast approximately 2 cm inferior to the mass, suspicious microcalcifications identified within the mass; there were scattered groups of calcifications also identified in the left breast with questionable areas of nodularity. A bilateral breast ultrasound was performed on that same date with a finding of a 4 cm spiculated mass in the right breast, 12 o'clock axis, 1 cm from the nipple, with a note that although other irregular areas of nodularity were present, tissue sampling of this mass was advised under ultrasound-guided core biopsy; the left breast had a 3 mm slightly irregular hypoechoic nodule at the 4 o'clock axis, 6 cm from the nipple with no area of suspicious architectural distortion or acoustical shadowing; no abnormal adenopathy was seen in either axilla. On 12/02/2020, the patient underwent a right breast 12 o'clock axis core biopsy with a finding of moderately differentiated invasive duct carcinoma with focal squamous differentiation, with core biopsy specimen measuring 1.2 cm, estrogen receptor positive (80%), progesterone receptor positive (30%), and HER-2/lou negative (1+ staining). She subsequently saw Dr. Flor Alfonso who then ordered a bilateral breast MRI which was performed on 12/15/2020 with a finding of a spiculated enhancing mass in the right upper central breast, middle depth which measured at least 3 x 4 cm corresponding to the biopsy-proven malignancy; there was nodule enhancement extending inferior, medial, and superior-lateral to the mass, which most likely represented a hematoma from the recent biopsy although satellite lesions could not be excluded; no evidence of contralateral breast disease was noted. She underwent a bilateral mastectomy, with the right mastectomy specimen showing 2 foci of invasive duct carcinoma, poorly differentiated, spanning 35 and 14 mm, respectively (the large focus demonstrated focal squamous differentiation), Mia-SBR score 9, margins negative, with non-extensive DCIS noted, evidence of lympho-vascular invasion was identified, and 1/1 sentinel lymph node with a micro-metastasis (1.6 mm), and 1/1 sentinel lymph node returning negative for metastasis. She had multiple oncology opinions: OncotypeDX returned with a RS 31 = 24% ROR at 7 years with GUTIERERZ or AI alone and a group average absolute chemotherapy benefit of ~ 15%. She underwent DD AC>>T 3/2021 to 6/2021 followed by RT to the chest wall. She started on anastrozole 11/2021.  Age 48: had symptomatic hip pain and had MRI of the hip done 1/19/2024 which showed extensive bony metastases largest in the R iliac bone/ acetabulum. She had Pet/ CT done on 1/31/2024 which showed multiple FDG avid lucencies in the axial skeleton compatible with osseous metastases SUV 12.8 at the iliac R; 3.9 in the T5 L transverse process, She had bone biopsy done on the R iliac which was positive for malignant cells: ER 95%, CO 0%, Her2 negative (1). She started palliative RT to the R pelvic region. She started on Kisqali and Faslodex 3/12/2024. She had genomic testing done 3/2024 in [facility]. She completed RT to the R pelvic region with Dr Ibrahim 2/2024.   [de-identified] : invasive ductal carcinoma ER positive, Her2 negative 2021; 2024: bx pending [de-identified] : [employer] RNA Insight testing which returned with no pathogenic mutation 2020 ddACT 3 to 6/2021 RT 8/2021 anastrozole 11/2021 to 1/2024 Kisqali/ Faslodex 3/12/2024 to present  genomic testing done: PIK3CA, PTEN, FGFR1 mutation  denosumab 3/27/2024 [de-identified] : She is present for follow up: had to hold kisqali for 3 weeks before ANC over 1 K/microL. Has current dosage reduced to 400 mg daily to allow for count recovery. Walking and activity tolerance improved with less hip pain. Still notices discomfort with vacuuming. She was eating more protein in the beginning but now eating less protein: plans to change diet to see if count recovery improved. She denies any new headache or cough or new chest wall pain. She is wondering about bone lesions seen on prior scans and wondering about next interval scans.

## 2024-06-05 NOTE — REASON FOR VISIT
[Follow-Up Visit] : a follow-up [Family Member] : family member [FreeTextEntry2] : follow up for metastatic breast cancer to the bones on kisqali/ faslodex and xgeva

## 2024-06-05 NOTE — PHYSICAL EXAM
[Restricted in physically strenuous activity but ambulatory and able to carry out work of a light or sedentary nature] : Status 1- Restricted in physically strenuous activity but ambulatory and able to carry out work of a light or sedentary nature, e.g., light house work, office work [Normal] : affect appropriate [de-identified] : able to ambulate with normal gait; negative SLR and strength BLE 4+/5  [de-identified] : b mastectomy; no skin nodules or palpable axillary LN

## 2024-06-05 NOTE — REVIEW OF SYSTEMS
[Diarrhea: Grade 0] : Diarrhea: Grade 0 [Joint Pain] : joint pain [Joint Stiffness] : joint stiffness [Muscle Pain] : no muscle pain [Muscle Weakness] : no muscle weakness [Suicidal] : not suicidal [Insomnia] : no insomnia [Anxiety] : anxiety [Depression] : no depression [Negative] : Allergic/Immunologic

## 2024-06-25 ENCOUNTER — APPOINTMENT (OUTPATIENT)
Dept: HEMATOLOGY ONCOLOGY | Facility: CLINIC | Age: 49
End: 2024-06-25

## 2024-06-25 ENCOUNTER — LABORATORY RESULT (OUTPATIENT)
Age: 49
End: 2024-06-25

## 2024-06-26 LAB
BASOPHILS # BLD AUTO: 0.04 K/UL
BASOPHILS NFR BLD AUTO: 1.7 %
EOSINOPHIL # BLD AUTO: 0.15 K/UL
EOSINOPHIL NFR BLD AUTO: 6.1 %
HCT VFR BLD CALC: 38.1 %
HGB BLD-MCNC: 12.4 G/DL
LYMPHOCYTES # BLD AUTO: 0.92 K/UL
LYMPHOCYTES NFR BLD AUTO: 36.5 %
MAN DIFF?: NORMAL
MCHC RBC-ENTMCNC: 31.2 PG
MCHC RBC-ENTMCNC: 32.5 GM/DL
MCV RBC AUTO: 95.7 FL
MONOCYTES # BLD AUTO: 0.24 K/UL
MONOCYTES NFR BLD AUTO: 9.6 %
NEUTROPHILS # BLD AUTO: 1.13 K/UL
NEUTROPHILS NFR BLD AUTO: 45.2 %
PLATELET # BLD AUTO: 147 K/UL
RBC # BLD: 3.98 M/UL
RBC # FLD: 14.9 %
WBC # FLD AUTO: 2.51 K/UL

## 2024-06-28 ENCOUNTER — OUTPATIENT (OUTPATIENT)
Dept: OUTPATIENT SERVICES | Facility: HOSPITAL | Age: 49
LOS: 1 days | Discharge: HOME | End: 2024-06-28

## 2024-06-28 DIAGNOSIS — Z98.890 OTHER SPECIFIED POSTPROCEDURAL STATES: Chronic | ICD-10-CM

## 2024-06-28 DIAGNOSIS — C50.911 MALIGNANT NEOPLASM OF UNSPECIFIED SITE OF RIGHT FEMALE BREAST: ICD-10-CM

## 2024-06-28 DIAGNOSIS — Z90.49 ACQUIRED ABSENCE OF OTHER SPECIFIED PARTS OF DIGESTIVE TRACT: Chronic | ICD-10-CM

## 2024-07-03 ENCOUNTER — APPOINTMENT (OUTPATIENT)
Dept: INFUSION THERAPY | Facility: HOSPITAL | Age: 49
End: 2024-07-03

## 2024-07-03 ENCOUNTER — APPOINTMENT (OUTPATIENT)
Dept: HEMATOLOGY ONCOLOGY | Facility: CLINIC | Age: 49
End: 2024-07-03
Payer: COMMERCIAL

## 2024-07-03 VITALS
OXYGEN SATURATION: 98 % | WEIGHT: 121.23 LBS | RESPIRATION RATE: 16 BRPM | TEMPERATURE: 97.7 F | SYSTOLIC BLOOD PRESSURE: 106 MMHG | HEART RATE: 79 BPM | DIASTOLIC BLOOD PRESSURE: 66 MMHG | BODY MASS INDEX: 20.18 KG/M2

## 2024-07-03 DIAGNOSIS — C50.911 MALIGNANT NEOPLASM OF UNSPECIFIED SITE OF RIGHT FEMALE BREAST: ICD-10-CM

## 2024-07-03 DIAGNOSIS — Z17.0 MALIGNANT NEOPLASM OF UNSPECIFIED SITE OF RIGHT FEMALE BREAST: ICD-10-CM

## 2024-07-03 DIAGNOSIS — C79.51 SECONDARY MALIGNANT NEOPLASM OF BONE: ICD-10-CM

## 2024-07-03 DIAGNOSIS — M25.551 PAIN IN RIGHT HIP: ICD-10-CM

## 2024-07-03 PROCEDURE — 99215 OFFICE O/P EST HI 40 MIN: CPT

## 2024-07-03 PROCEDURE — G2211 COMPLEX E/M VISIT ADD ON: CPT

## 2024-07-03 RX ORDER — RIBOCICLIB 200 MG/1
200 TABLET, FILM COATED ORAL DAILY
Qty: 42 | Refills: 3 | Status: ACTIVE | COMMUNITY
Start: 2024-07-03 | End: 1900-01-01

## 2024-07-04 DIAGNOSIS — C79.51 SECONDARY MALIGNANT NEOPLASM OF BONE: ICD-10-CM

## 2024-07-25 ENCOUNTER — APPOINTMENT (OUTPATIENT)
Dept: NUCLEAR MEDICINE | Facility: IMAGING CENTER | Age: 49
End: 2024-07-25
Payer: COMMERCIAL

## 2024-07-25 ENCOUNTER — NON-APPOINTMENT (OUTPATIENT)
Age: 49
End: 2024-07-25

## 2024-07-25 ENCOUNTER — RESULT REVIEW (OUTPATIENT)
Age: 49
End: 2024-07-25

## 2024-07-25 ENCOUNTER — APPOINTMENT (OUTPATIENT)
Dept: HEMATOLOGY ONCOLOGY | Facility: CLINIC | Age: 49
End: 2024-07-25

## 2024-07-25 LAB
BASOPHILS # BLD AUTO: 0.05 K/UL — SIGNIFICANT CHANGE UP (ref 0–0.2)
BASOPHILS NFR BLD AUTO: 1.8 % — SIGNIFICANT CHANGE UP (ref 0–2)
EOSINOPHIL # BLD AUTO: 0.07 K/UL — SIGNIFICANT CHANGE UP (ref 0–0.5)
EOSINOPHIL NFR BLD AUTO: 2.5 % — SIGNIFICANT CHANGE UP (ref 0–6)
HCT VFR BLD CALC: 35.4 % — SIGNIFICANT CHANGE UP (ref 34.5–45)
HGB BLD-MCNC: 12.4 G/DL — SIGNIFICANT CHANGE UP (ref 11.5–15.5)
IMM GRANULOCYTES NFR BLD AUTO: 0 % — SIGNIFICANT CHANGE UP (ref 0–0.9)
LYMPHOCYTES # BLD AUTO: 0.75 K/UL — LOW (ref 1–3.3)
LYMPHOCYTES # BLD AUTO: 27.2 % — SIGNIFICANT CHANGE UP (ref 13–44)
MCHC RBC-ENTMCNC: 33.1 PG — SIGNIFICANT CHANGE UP (ref 27–34)
MCHC RBC-ENTMCNC: 35 G/DL — SIGNIFICANT CHANGE UP (ref 32–36)
MCV RBC AUTO: 94.4 FL — SIGNIFICANT CHANGE UP (ref 80–100)
MONOCYTES # BLD AUTO: 0.39 K/UL — SIGNIFICANT CHANGE UP (ref 0–0.9)
MONOCYTES NFR BLD AUTO: 14.1 % — HIGH (ref 2–14)
NEUTROPHILS # BLD AUTO: 1.5 K/UL — LOW (ref 1.8–7.4)
NEUTROPHILS NFR BLD AUTO: 54.4 % — SIGNIFICANT CHANGE UP (ref 43–77)
NRBC # BLD: 0 /100 WBCS — SIGNIFICANT CHANGE UP (ref 0–0)
PLATELET # BLD AUTO: 137 K/UL — LOW (ref 150–400)
RBC # BLD: 3.75 M/UL — LOW (ref 3.8–5.2)
RBC # FLD: 13.5 % — SIGNIFICANT CHANGE UP (ref 10.3–14.5)
WBC # BLD: 2.76 K/UL — LOW (ref 3.8–10.5)
WBC # FLD AUTO: 2.76 K/UL — LOW (ref 3.8–10.5)

## 2024-07-25 PROCEDURE — 78815 PET IMAGE W/CT SKULL-THIGH: CPT | Mod: 26,PS

## 2024-07-26 ENCOUNTER — NON-APPOINTMENT (OUTPATIENT)
Age: 49
End: 2024-07-26

## 2024-07-26 LAB
ALBUMIN SERPL ELPH-MCNC: 4.1 G/DL
ALP BLD-CCNC: 70 U/L
ALT SERPL-CCNC: 8 U/L
ANION GAP SERPL CALC-SCNC: 13 MMOL/L
AST SERPL-CCNC: 18 U/L
BILIRUB SERPL-MCNC: 0.3 MG/DL
BUN SERPL-MCNC: 10 MG/DL
CALCIUM SERPL-MCNC: 8.5 MG/DL
CANCER AG27-29 SERPL-ACNC: 641.1 U/ML
CEA SERPL-MCNC: 5.2 NG/ML
CHLORIDE SERPL-SCNC: 101 MMOL/L
CO2 SERPL-SCNC: 23 MMOL/L
CREAT SERPL-MCNC: 0.68 MG/DL
EGFR: 106 ML/MIN/1.73M2
GLUCOSE SERPL-MCNC: 149 MG/DL
POTASSIUM SERPL-SCNC: 4.4 MMOL/L
PROT SERPL-MCNC: 6.6 G/DL
SODIUM SERPL-SCNC: 138 MMOL/L

## 2024-07-31 ENCOUNTER — APPOINTMENT (OUTPATIENT)
Dept: INFUSION THERAPY | Facility: HOSPITAL | Age: 49
End: 2024-07-31

## 2024-07-31 ENCOUNTER — APPOINTMENT (OUTPATIENT)
Dept: HEMATOLOGY ONCOLOGY | Facility: CLINIC | Age: 49
End: 2024-07-31
Payer: COMMERCIAL

## 2024-07-31 ENCOUNTER — RESULT REVIEW (OUTPATIENT)
Age: 49
End: 2024-07-31

## 2024-07-31 ENCOUNTER — APPOINTMENT (OUTPATIENT)
Dept: HEMATOLOGY ONCOLOGY | Facility: CLINIC | Age: 49
End: 2024-07-31

## 2024-07-31 VITALS
TEMPERATURE: 98.3 F | OXYGEN SATURATION: 97 % | SYSTOLIC BLOOD PRESSURE: 96 MMHG | BODY MASS INDEX: 20.36 KG/M2 | HEART RATE: 73 BPM | DIASTOLIC BLOOD PRESSURE: 62 MMHG | RESPIRATION RATE: 16 BRPM | WEIGHT: 122.35 LBS

## 2024-07-31 DIAGNOSIS — Z17.0 MALIGNANT NEOPLASM OF UNSPECIFIED SITE OF RIGHT FEMALE BREAST: ICD-10-CM

## 2024-07-31 DIAGNOSIS — C79.51 SECONDARY MALIGNANT NEOPLASM OF BONE: ICD-10-CM

## 2024-07-31 DIAGNOSIS — C50.911 MALIGNANT NEOPLASM OF UNSPECIFIED SITE OF RIGHT FEMALE BREAST: ICD-10-CM

## 2024-07-31 LAB
BASOPHILS # BLD AUTO: 0.06 K/UL — SIGNIFICANT CHANGE UP (ref 0–0.2)
BASOPHILS NFR BLD AUTO: 2.3 % — HIGH (ref 0–2)
EOSINOPHIL # BLD AUTO: 0.11 K/UL — SIGNIFICANT CHANGE UP (ref 0–0.5)
EOSINOPHIL NFR BLD AUTO: 4.2 % — SIGNIFICANT CHANGE UP (ref 0–6)
HCT VFR BLD CALC: 36.8 % — SIGNIFICANT CHANGE UP (ref 34.5–45)
HGB BLD-MCNC: 12.4 G/DL — SIGNIFICANT CHANGE UP (ref 11.5–15.5)
IMM GRANULOCYTES NFR BLD AUTO: 0 % — SIGNIFICANT CHANGE UP (ref 0–0.9)
LYMPHOCYTES # BLD AUTO: 0.61 K/UL — LOW (ref 1–3.3)
LYMPHOCYTES # BLD AUTO: 23.3 % — SIGNIFICANT CHANGE UP (ref 13–44)
MCHC RBC-ENTMCNC: 32.1 PG — SIGNIFICANT CHANGE UP (ref 27–34)
MCHC RBC-ENTMCNC: 33.7 G/DL — SIGNIFICANT CHANGE UP (ref 32–36)
MCV RBC AUTO: 95.3 FL — SIGNIFICANT CHANGE UP (ref 80–100)
MONOCYTES # BLD AUTO: 0.17 K/UL — SIGNIFICANT CHANGE UP (ref 0–0.9)
MONOCYTES NFR BLD AUTO: 6.5 % — SIGNIFICANT CHANGE UP (ref 2–14)
NEUTROPHILS # BLD AUTO: 1.67 K/UL — LOW (ref 1.8–7.4)
NEUTROPHILS NFR BLD AUTO: 63.7 % — SIGNIFICANT CHANGE UP (ref 43–77)
NRBC # BLD: 0 /100 WBCS — SIGNIFICANT CHANGE UP (ref 0–0)
PLATELET # BLD AUTO: 170 K/UL — SIGNIFICANT CHANGE UP (ref 150–400)
RBC # BLD: 3.86 M/UL — SIGNIFICANT CHANGE UP (ref 3.8–5.2)
RBC # FLD: 13.5 % — SIGNIFICANT CHANGE UP (ref 10.3–14.5)
WBC # BLD: 2.62 K/UL — LOW (ref 3.8–10.5)
WBC # FLD AUTO: 2.62 K/UL — LOW (ref 3.8–10.5)

## 2024-07-31 PROCEDURE — 99215 OFFICE O/P EST HI 40 MIN: CPT

## 2024-07-31 PROCEDURE — G2211 COMPLEX E/M VISIT ADD ON: CPT

## 2024-07-31 PROCEDURE — 99417 PROLNG OP E/M EACH 15 MIN: CPT

## 2024-08-02 NOTE — HISTORY OF PRESENT ILLNESS
[Disease: _____________________] : Disease: [unfilled] [T: ___] : T[unfilled] [N: ___] : N[unfilled] [AJCC Stage: ____] : AJCC Stage: [unfilled] [de-identified] : Age 45: right breast cancer Palpable mass on self-exam: she had diagnostic mammogram on 11/25/2020 with the finding of a spiculated mass in the supra-areolar region of the right breast, mid level to the chest wall, with suspicious microcalcifications identified, extending for approximately 4 cm with a suggestion of retraction with extension to the anterior surface of the breast approximately 2 cm inferior to the mass, suspicious microcalcifications identified within the mass; there were scattered groups of calcifications also identified in the left breast with questionable areas of nodularity. A bilateral breast ultrasound was performed on that same date with a finding of a 4 cm spiculated mass in the right breast, 12 o'clock axis, 1 cm from the nipple, with a note that although other irregular areas of nodularity were present, tissue sampling of this mass was advised under ultrasound-guided core biopsy; the left breast had a 3 mm slightly irregular hypoechoic nodule at the 4 o'clock axis, 6 cm from the nipple with no area of suspicious architectural distortion or acoustical shadowing; no abnormal adenopathy was seen in either axilla. On 12/02/2020, the patient underwent a right breast 12 o'clock axis core biopsy with a finding of moderately differentiated invasive duct carcinoma with focal squamous differentiation, with core biopsy specimen measuring 1.2 cm, estrogen receptor positive (80%), progesterone receptor positive (30%), and HER-2/lou negative (1+ staining). She subsequently saw Dr. Flor Alfonso who then ordered a bilateral breast MRI which was performed on 12/15/2020 with a finding of a spiculated enhancing mass in the right upper central breast, middle depth which measured at least 3 x 4 cm corresponding to the biopsy-proven malignancy; there was nodule enhancement extending inferior, medial, and superior-lateral to the mass, which most likely represented a hematoma from the recent biopsy although satellite lesions could not be excluded; no evidence of contralateral breast disease was noted. She underwent a bilateral mastectomy, with the right mastectomy specimen showing 2 foci of invasive duct carcinoma, poorly differentiated, spanning 35 and 14 mm, respectively (the large focus demonstrated focal squamous differentiation), Mia-SBR score 9, margins negative, with non-extensive DCIS noted, evidence of lympho-vascular invasion was identified, and 1/1 sentinel lymph node with a micro-metastasis (1.6 mm), and 1/1 sentinel lymph node returning negative for metastasis. She had multiple oncology opinions: OncotypeDX returned with a RS 31 = 24% ROR at 7 years with GUTIERREZ or AI alone and a group average absolute chemotherapy benefit of ~ 15%. She underwent DD AC>>T 3/2021 to 6/2021 followed by RT to the chest wall. She started on anastrozole 11/2021.  Age 48: had symptomatic hip pain and had MRI of the hip done 1/19/2024 which showed extensive bony metastases largest in the R iliac bone/ acetabulum. She had Pet/ CT done on 1/31/2024 which showed multiple FDG avid lucencies in the axial skeleton compatible with osseous metastases SUV 12.8 at the iliac R; 3.9 in the T5 L transverse process, She had bone biopsy done on the R iliac which was positive for malignant cells: ER 95%, NV 0%, Her2 negative (1). She started palliative RT to the R pelvic region. She started on Kisqali and Faslodex 3/12/2024. She had genomic testing done 3/2024 in [facility]. She completed RT to the R pelvic region with Dr Ibrahim 2/2024. She had interval Pet/ CT done on 7/25/2024 which showed progression of osseous metastases in the axial and appendicular skeleton with interval visualization of new lesions. resolution of soft tissue left chest wall and right iliac bone decreased in size and avidity from RT.   [de-identified] : invasive ductal carcinoma ER positive, Her2 negative 2021; 2024: bx pending [de-identified] : [employer] RNA Insight testing which returned with no pathogenic mutation 2020 ddACT 3 to 6/2021 RT 8/2021 anastrozole 11/2021 to 1/2024 Kisqali/ Faslodex 3/12/2024 to present  genomic testing done: PIK3CA, PTEN, FGFR1 mutation  denosumab 3/27/2024 [de-identified] : She is present with her friend to review 2nd line treatment options for disease. She has no new hip or back pain. She is wondering where the new lesions are and here to also review Pet/ CT.

## 2024-08-02 NOTE — REASON FOR VISIT
[Follow-Up Visit] : a follow-up [Friend] : friend [FreeTextEntry2] : follow up for breast cancer metastatic to the bones with Pet/ CT showing progression of disease on Kisqali and Faslodex

## 2024-08-02 NOTE — PHYSICAL EXAM
[Restricted in physically strenuous activity but ambulatory and able to carry out work of a light or sedentary nature] : Status 1- Restricted in physically strenuous activity but ambulatory and able to carry out work of a light or sedentary nature, e.g., light house work, office work [Normal] : affect appropriate [de-identified] : normal respiratory effort

## 2024-08-02 NOTE — ASSESSMENT
[FreeTextEntry1] : She is a 47 y/o BRCA negative  F who had Stage II right invasive ductal carcinoma s/p ddACT followed by RT and had been on anastrozole since 2021. We reviewed her R iliac bone biopsy that is consistent with metastatic breast cancer to the bones and ER positive, NV negative, Her2 negative. We reviewed her PET/ CT results and copy of report given to her. We reviewed goals of palliative therapy and progression of disease with new bone lesions on Kisqali/ Faslodex. We reviewed increasing tumor markers from blood work done 7/25/2024. We reviewed Guardant to evaluate for new mutations. Reviewed past Foundation showing PIK3CA mutation and while Piqray and Truqap could be considered: we reviewed they are given with Faslodex and given short endocrine sensitivity to Faslodex and Kisqali: we would not recommend the Faslodex with Piqray. We reviewed capecitabine one week on and one week off 3 tablets twice a day. We reviewed potential AE including but not limited to: GI upset, diarrhea, mouth sores, hand/ foot skin changes, tiredness and mild hair loss. We reviewed alternative treatment: elacestrant if ESR 1 mutation found on Guardant testing. We reviewed goals of care with palliative treatment to extend life and control symptoms. She asked if she could stop all treatment. We reviewed she could stop all treatment at anytime but reviewed we would support her through potential AE of therapy. We reviewed her concerns of symptoms worsening due to IBS and her past intolerance of iv chemotherapy. We reviewed titration from 2 tablets twice a day to 3 tablets twice a day to lessen AE. Questions answered to her satisfaction. She will consider capecitabine option.   Anxiety: continue with alprazolam and support system.   Bone mets: she continues with Xgeva: has not been able to connect with insurance authorizers and Michaelle will help with connection to check and see what is happening with Xgeva. Back/ hip pain after RT improved pain symptoms and will continue to monitor.

## 2024-08-05 ENCOUNTER — NON-APPOINTMENT (OUTPATIENT)
Age: 49
End: 2024-08-05

## 2024-08-13 RX ORDER — CAPIVASERTIB 200 MG/1
200 TABLET, FILM COATED ORAL
Qty: 64 | Refills: 1 | Status: ACTIVE | COMMUNITY
Start: 2024-08-13 | End: 1900-01-01

## 2024-08-15 ENCOUNTER — TRANSCRIPTION ENCOUNTER (OUTPATIENT)
Age: 49
End: 2024-08-15

## 2024-08-16 ENCOUNTER — TRANSCRIPTION ENCOUNTER (OUTPATIENT)
Age: 49
End: 2024-08-16

## 2024-08-16 ENCOUNTER — NON-APPOINTMENT (OUTPATIENT)
Age: 49
End: 2024-08-16

## 2024-08-19 ENCOUNTER — OUTPATIENT (OUTPATIENT)
Dept: OUTPATIENT SERVICES | Facility: HOSPITAL | Age: 49
LOS: 1 days | Discharge: HOME | End: 2024-08-19

## 2024-08-19 DIAGNOSIS — C50.911 MALIGNANT NEOPLASM OF UNSPECIFIED SITE OF RIGHT FEMALE BREAST: ICD-10-CM

## 2024-08-19 DIAGNOSIS — Z98.890 OTHER SPECIFIED POSTPROCEDURAL STATES: Chronic | ICD-10-CM

## 2024-08-19 DIAGNOSIS — Z90.49 ACQUIRED ABSENCE OF OTHER SPECIFIED PARTS OF DIGESTIVE TRACT: Chronic | ICD-10-CM

## 2024-08-20 ENCOUNTER — TRANSCRIPTION ENCOUNTER (OUTPATIENT)
Age: 49
End: 2024-08-20

## 2024-08-28 ENCOUNTER — TRANSCRIPTION ENCOUNTER (OUTPATIENT)
Age: 49
End: 2024-08-28

## 2024-08-28 ENCOUNTER — NON-APPOINTMENT (OUTPATIENT)
Age: 49
End: 2024-08-28

## 2024-08-29 ENCOUNTER — APPOINTMENT (OUTPATIENT)
Dept: INFUSION THERAPY | Facility: HOSPITAL | Age: 49
End: 2024-08-29

## 2024-08-29 ENCOUNTER — RESULT REVIEW (OUTPATIENT)
Age: 49
End: 2024-08-29

## 2024-08-29 ENCOUNTER — APPOINTMENT (OUTPATIENT)
Dept: HEMATOLOGY ONCOLOGY | Facility: CLINIC | Age: 49
End: 2024-08-29

## 2024-08-29 LAB
BASOPHILS # BLD AUTO: 0.03 K/UL — SIGNIFICANT CHANGE UP (ref 0–0.2)
BASOPHILS NFR BLD AUTO: 0.7 % — SIGNIFICANT CHANGE UP (ref 0–2)
EOSINOPHIL # BLD AUTO: 0.29 K/UL — SIGNIFICANT CHANGE UP (ref 0–0.5)
EOSINOPHIL NFR BLD AUTO: 6.7 % — HIGH (ref 0–6)
HCT VFR BLD CALC: 39.9 % — SIGNIFICANT CHANGE UP (ref 34.5–45)
HGB BLD-MCNC: 13.5 G/DL — SIGNIFICANT CHANGE UP (ref 11.5–15.5)
IMM GRANULOCYTES NFR BLD AUTO: 0.2 % — SIGNIFICANT CHANGE UP (ref 0–0.9)
LYMPHOCYTES # BLD AUTO: 0.82 K/UL — LOW (ref 1–3.3)
LYMPHOCYTES # BLD AUTO: 18.8 % — SIGNIFICANT CHANGE UP (ref 13–44)
MCHC RBC-ENTMCNC: 31.9 PG — SIGNIFICANT CHANGE UP (ref 27–34)
MCHC RBC-ENTMCNC: 33.8 G/DL — SIGNIFICANT CHANGE UP (ref 32–36)
MCV RBC AUTO: 94.3 FL — SIGNIFICANT CHANGE UP (ref 80–100)
MONOCYTES # BLD AUTO: 0.55 K/UL — SIGNIFICANT CHANGE UP (ref 0–0.9)
MONOCYTES NFR BLD AUTO: 12.6 % — SIGNIFICANT CHANGE UP (ref 2–14)
NEUTROPHILS # BLD AUTO: 2.66 K/UL — SIGNIFICANT CHANGE UP (ref 1.8–7.4)
NEUTROPHILS NFR BLD AUTO: 61 % — SIGNIFICANT CHANGE UP (ref 43–77)
NRBC # BLD: 0 /100 WBCS — SIGNIFICANT CHANGE UP (ref 0–0)
PLATELET # BLD AUTO: 162 K/UL — SIGNIFICANT CHANGE UP (ref 150–400)
RBC # BLD: 4.23 M/UL — SIGNIFICANT CHANGE UP (ref 3.8–5.2)
RBC # FLD: 12.5 % — SIGNIFICANT CHANGE UP (ref 10.3–14.5)
WBC # BLD: 4.36 K/UL — SIGNIFICANT CHANGE UP (ref 3.8–10.5)
WBC # FLD AUTO: 4.36 K/UL — SIGNIFICANT CHANGE UP (ref 3.8–10.5)

## 2024-08-30 ENCOUNTER — TRANSCRIPTION ENCOUNTER (OUTPATIENT)
Age: 49
End: 2024-08-30

## 2024-08-30 DIAGNOSIS — C79.51 SECONDARY MALIGNANT NEOPLASM OF BONE: ICD-10-CM

## 2024-08-30 LAB
ALBUMIN SERPL ELPH-MCNC: 4.3 G/DL
ALP BLD-CCNC: 79 U/L
ALT SERPL-CCNC: 17 U/L
ANION GAP SERPL CALC-SCNC: 10 MMOL/L
AST SERPL-CCNC: 25 U/L
BILIRUB SERPL-MCNC: 0.3 MG/DL
BUN SERPL-MCNC: 10 MG/DL
CALCIUM SERPL-MCNC: 8.8 MG/DL
CANCER AG27-29 SERPL-ACNC: 1022.4 U/ML
CEA SERPL-MCNC: 8 NG/ML
CHLORIDE SERPL-SCNC: 103 MMOL/L
CO2 SERPL-SCNC: 28 MMOL/L
CREAT SERPL-MCNC: 0.65 MG/DL
EGFR: 107 ML/MIN/1.73M2
GLUCOSE SERPL-MCNC: 91 MG/DL
POTASSIUM SERPL-SCNC: 4.3 MMOL/L
PROT SERPL-MCNC: 6.9 G/DL
SODIUM SERPL-SCNC: 140 MMOL/L

## 2024-08-30 RX ORDER — DENOSUMAB 120 MG/1.7ML
120 INJECTION SUBCUTANEOUS
Qty: 1 | Refills: 2 | Status: ACTIVE | COMMUNITY
Start: 2024-08-30 | End: 1900-01-01

## 2024-09-05 ENCOUNTER — NON-APPOINTMENT (OUTPATIENT)
Age: 49
End: 2024-09-05

## 2024-09-09 ENCOUNTER — NON-APPOINTMENT (OUTPATIENT)
Age: 49
End: 2024-09-09

## 2024-09-10 ENCOUNTER — NON-APPOINTMENT (OUTPATIENT)
Age: 49
End: 2024-09-10

## 2024-09-10 ENCOUNTER — TRANSCRIPTION ENCOUNTER (OUTPATIENT)
Age: 49
End: 2024-09-10

## 2024-09-10 DIAGNOSIS — K52.1 TOXIC GASTROENTERITIS AND COLITIS: ICD-10-CM

## 2024-09-11 ENCOUNTER — APPOINTMENT (OUTPATIENT)
Dept: HEMATOLOGY ONCOLOGY | Facility: CLINIC | Age: 49
End: 2024-09-11

## 2024-09-12 ENCOUNTER — APPOINTMENT (OUTPATIENT)
Dept: HEMATOLOGY ONCOLOGY | Facility: CLINIC | Age: 49
End: 2024-09-12

## 2024-09-12 ENCOUNTER — NON-APPOINTMENT (OUTPATIENT)
Age: 49
End: 2024-09-12

## 2024-09-12 RX ORDER — SERTRALINE HYDROCHLORIDE 100 MG/1
100 TABLET, FILM COATED ORAL
Refills: 0 | Status: ACTIVE | COMMUNITY
Start: 2024-09-12

## 2024-09-19 LAB
ALBUMIN SERPL ELPH-MCNC: 4.5 G/DL
ALP BLD-CCNC: 84 U/L
ALT SERPL-CCNC: 10 U/L
ANION GAP SERPL CALC-SCNC: 10 MMOL/L
AST SERPL-CCNC: 17 U/L
BILIRUB SERPL-MCNC: 0.3 MG/DL
BUN SERPL-MCNC: 9 MG/DL
CALCIUM SERPL-MCNC: 10.3 MG/DL
CHLORIDE SERPL-SCNC: 102 MMOL/L
CO2 SERPL-SCNC: 29 MMOL/L
CREAT SERPL-MCNC: 0.82 MG/DL
EGFR: 87 ML/MIN/1.73M2
GLUCOSE SERPL-MCNC: 83 MG/DL
HCT VFR BLD CALC: 40.6 %
HGB BLD-MCNC: 13.4 G/DL
MAGNESIUM SERPL-MCNC: 1.9 MG/DL
MCHC RBC-ENTMCNC: 31.2 PG
MCHC RBC-ENTMCNC: 33 GM/DL
MCV RBC AUTO: 94.4 FL
PLATELET # BLD AUTO: 176 K/UL
POTASSIUM SERPL-SCNC: 4 MMOL/L
PROT SERPL-MCNC: 6.8 G/DL
RBC # BLD: 4.3 M/UL
RBC # FLD: 11.9 %
SODIUM SERPL-SCNC: 141 MMOL/L
WBC # FLD AUTO: 4.1 K/UL

## 2024-09-27 ENCOUNTER — APPOINTMENT (OUTPATIENT)
Dept: INFUSION THERAPY | Facility: HOSPITAL | Age: 49
End: 2024-09-27

## 2024-10-09 ENCOUNTER — NON-APPOINTMENT (OUTPATIENT)
Age: 49
End: 2024-10-09

## 2024-10-14 ENCOUNTER — NON-APPOINTMENT (OUTPATIENT)
Age: 49
End: 2024-10-14

## 2024-10-15 ENCOUNTER — NON-APPOINTMENT (OUTPATIENT)
Age: 49
End: 2024-10-15

## 2024-10-20 ENCOUNTER — OUTPATIENT (OUTPATIENT)
Dept: OUTPATIENT SERVICES | Facility: HOSPITAL | Age: 49
LOS: 1 days | Discharge: HOME | End: 2024-10-20

## 2024-10-20 DIAGNOSIS — Z90.49 ACQUIRED ABSENCE OF OTHER SPECIFIED PARTS OF DIGESTIVE TRACT: Chronic | ICD-10-CM

## 2024-10-20 DIAGNOSIS — Z98.890 OTHER SPECIFIED POSTPROCEDURAL STATES: Chronic | ICD-10-CM

## 2024-10-20 DIAGNOSIS — C50.911 MALIGNANT NEOPLASM OF UNSPECIFIED SITE OF RIGHT FEMALE BREAST: ICD-10-CM

## 2024-10-25 ENCOUNTER — APPOINTMENT (OUTPATIENT)
Dept: HEMATOLOGY ONCOLOGY | Facility: CLINIC | Age: 49
End: 2024-10-25
Payer: COMMERCIAL

## 2024-10-25 ENCOUNTER — APPOINTMENT (OUTPATIENT)
Dept: INFUSION THERAPY | Facility: HOSPITAL | Age: 49
End: 2024-10-25

## 2024-10-25 VITALS
WEIGHT: 117.95 LBS | BODY MASS INDEX: 19.63 KG/M2 | RESPIRATION RATE: 16 BRPM | TEMPERATURE: 97.3 F | HEART RATE: 76 BPM | OXYGEN SATURATION: 97 % | DIASTOLIC BLOOD PRESSURE: 58 MMHG | SYSTOLIC BLOOD PRESSURE: 90 MMHG

## 2024-10-25 DIAGNOSIS — K52.1 TOXIC GASTROENTERITIS AND COLITIS: ICD-10-CM

## 2024-10-25 DIAGNOSIS — C50.911 MALIGNANT NEOPLASM OF UNSPECIFIED SITE OF RIGHT FEMALE BREAST: ICD-10-CM

## 2024-10-25 DIAGNOSIS — C79.51 SECONDARY MALIGNANT NEOPLASM OF BONE: ICD-10-CM

## 2024-10-25 DIAGNOSIS — Z17.0 MALIGNANT NEOPLASM OF UNSPECIFIED SITE OF RIGHT FEMALE BREAST: ICD-10-CM

## 2024-10-25 PROCEDURE — G2211 COMPLEX E/M VISIT ADD ON: CPT | Mod: NC

## 2024-10-25 PROCEDURE — 99215 OFFICE O/P EST HI 40 MIN: CPT

## 2024-10-28 DIAGNOSIS — C79.51 SECONDARY MALIGNANT NEOPLASM OF BONE: ICD-10-CM

## 2024-11-22 ENCOUNTER — APPOINTMENT (OUTPATIENT)
Dept: INFUSION THERAPY | Facility: HOSPITAL | Age: 49
End: 2024-11-22

## 2024-12-17 NOTE — DISEASE MANAGEMENT
[Pathological] : TNM Stage: p [IIA] : IIA [TTNM] : 2 [NTNM] : 1mi [MTNM] : 0 Detail Level: Detailed General Sunscreen Counseling: Broad Spectrum SPF 30 or greater is recommended or photoprotective clothing. Please reapply every two hours,or more often if sweating or swimming. Avoid sun during peak hours (9:00 AM to 3:00 PM)

## 2024-12-20 ENCOUNTER — APPOINTMENT (OUTPATIENT)
Dept: INFUSION THERAPY | Facility: HOSPITAL | Age: 49
End: 2024-12-20

## 2024-12-20 ENCOUNTER — RESULT REVIEW (OUTPATIENT)
Age: 49
End: 2024-12-20

## 2024-12-20 ENCOUNTER — APPOINTMENT (OUTPATIENT)
Dept: HEMATOLOGY ONCOLOGY | Facility: CLINIC | Age: 49
End: 2024-12-20

## 2024-12-20 VITALS
RESPIRATION RATE: 16 BRPM | OXYGEN SATURATION: 97 % | BODY MASS INDEX: 19.44 KG/M2 | HEART RATE: 86 BPM | TEMPERATURE: 97 F | WEIGHT: 116.84 LBS | DIASTOLIC BLOOD PRESSURE: 68 MMHG | SYSTOLIC BLOOD PRESSURE: 100 MMHG

## 2024-12-20 DIAGNOSIS — C79.51 SECONDARY MALIGNANT NEOPLASM OF BONE: ICD-10-CM

## 2024-12-20 DIAGNOSIS — C50.911 MALIGNANT NEOPLASM OF UNSPECIFIED SITE OF RIGHT FEMALE BREAST: ICD-10-CM

## 2024-12-20 DIAGNOSIS — T45.1X5A PAIN IN UNSPECIFIED JOINT: ICD-10-CM

## 2024-12-20 DIAGNOSIS — R53.83 OTHER FATIGUE: ICD-10-CM

## 2024-12-20 DIAGNOSIS — Z17.0 MALIGNANT NEOPLASM OF UNSPECIFIED SITE OF RIGHT FEMALE BREAST: ICD-10-CM

## 2024-12-20 DIAGNOSIS — M25.50 PAIN IN UNSPECIFIED JOINT: ICD-10-CM

## 2024-12-20 DIAGNOSIS — K52.1 TOXIC GASTROENTERITIS AND COLITIS: ICD-10-CM

## 2024-12-20 PROCEDURE — G2211 COMPLEX E/M VISIT ADD ON: CPT | Mod: NC

## 2024-12-20 PROCEDURE — 99215 OFFICE O/P EST HI 40 MIN: CPT

## 2024-12-21 LAB
ALBUMIN SERPL ELPH-MCNC: 4.7 G/DL
ALP BLD-CCNC: 83 U/L
ALT SERPL-CCNC: 23 U/L
ANION GAP SERPL CALC-SCNC: 11 MMOL/L
AST SERPL-CCNC: 32 U/L
BILIRUB SERPL-MCNC: 0.2 MG/DL
BUN SERPL-MCNC: 13 MG/DL
CALCIUM SERPL-MCNC: 9.8 MG/DL
CEA SERPL-MCNC: 11.2 NG/ML
CHLORIDE SERPL-SCNC: 103 MMOL/L
CO2 SERPL-SCNC: 28 MMOL/L
CREAT SERPL-MCNC: 0.74 MG/DL
EGFR: 98 ML/MIN/1.73M2
GLUCOSE SERPL-MCNC: 82 MG/DL
POTASSIUM SERPL-SCNC: 4.4 MMOL/L
PROT SERPL-MCNC: 7.5 G/DL
SODIUM SERPL-SCNC: 142 MMOL/L

## 2024-12-30 ENCOUNTER — NON-APPOINTMENT (OUTPATIENT)
Age: 49
End: 2024-12-30

## 2025-01-01 ENCOUNTER — INPATIENT (INPATIENT)
Facility: HOSPITAL | Age: 50
LOS: 8 days | DRG: 552 | End: 2025-01-29
Attending: INTERNAL MEDICINE | Admitting: STUDENT IN AN ORGANIZED HEALTH CARE EDUCATION/TRAINING PROGRAM
Payer: COMMERCIAL

## 2025-01-01 VITALS
OXYGEN SATURATION: 92 % | RESPIRATION RATE: 24 BRPM | DIASTOLIC BLOOD PRESSURE: 78 MMHG | HEART RATE: 134 BPM | SYSTOLIC BLOOD PRESSURE: 106 MMHG | TEMPERATURE: 98 F

## 2025-01-01 VITALS — WEIGHT: 104.94 LBS | HEIGHT: 65 IN

## 2025-01-01 DIAGNOSIS — R53.83 OTHER FATIGUE: ICD-10-CM

## 2025-01-01 DIAGNOSIS — Z51.5 ENCOUNTER FOR PALLIATIVE CARE: ICD-10-CM

## 2025-01-01 DIAGNOSIS — Z98.890 OTHER SPECIFIED POSTPROCEDURAL STATES: Chronic | ICD-10-CM

## 2025-01-01 DIAGNOSIS — G89.3 NEOPLASM RELATED PAIN (ACUTE) (CHRONIC): ICD-10-CM

## 2025-01-01 DIAGNOSIS — Z29.9 ENCOUNTER FOR PROPHYLACTIC MEASURES, UNSPECIFIED: ICD-10-CM

## 2025-01-01 DIAGNOSIS — Z71.89 OTHER SPECIFIED COUNSELING: ICD-10-CM

## 2025-01-01 DIAGNOSIS — G93.41 METABOLIC ENCEPHALOPATHY: ICD-10-CM

## 2025-01-01 DIAGNOSIS — Z90.49 ACQUIRED ABSENCE OF OTHER SPECIFIED PARTS OF DIGESTIVE TRACT: Chronic | ICD-10-CM

## 2025-01-01 DIAGNOSIS — M54.9 DORSALGIA, UNSPECIFIED: ICD-10-CM

## 2025-01-01 DIAGNOSIS — C50.919 MALIGNANT NEOPLASM OF UNSPECIFIED SITE OF UNSPECIFIED FEMALE BREAST: ICD-10-CM

## 2025-01-01 DIAGNOSIS — E80.6 OTHER DISORDERS OF BILIRUBIN METABOLISM: ICD-10-CM

## 2025-01-01 DIAGNOSIS — R74.01 ELEVATION OF LEVELS OF LIVER TRANSAMINASE LEVELS: ICD-10-CM

## 2025-01-01 DIAGNOSIS — K59.00 CONSTIPATION, UNSPECIFIED: ICD-10-CM

## 2025-01-01 LAB
ADD ON TEST-SPECIMEN IN LAB: SIGNIFICANT CHANGE UP
ALBUMIN SERPL ELPH-MCNC: 2.2 G/DL — LOW (ref 3.3–5)
ALBUMIN SERPL ELPH-MCNC: 2.8 G/DL — LOW (ref 3.3–5)
ALBUMIN SERPL ELPH-MCNC: 2.8 G/DL — LOW (ref 3.3–5)
ALBUMIN SERPL ELPH-MCNC: 2.9 G/DL — LOW (ref 3.3–5)
ALBUMIN SERPL ELPH-MCNC: 3.3 G/DL — SIGNIFICANT CHANGE UP (ref 3.3–5)
ALBUMIN SERPL ELPH-MCNC: 3.3 G/DL — SIGNIFICANT CHANGE UP (ref 3.3–5)
ALBUMIN SERPL ELPH-MCNC: 3.8 G/DL — SIGNIFICANT CHANGE UP (ref 3.3–5)
ALP SERPL-CCNC: 218 U/L — HIGH (ref 40–120)
ALP SERPL-CCNC: 230 U/L — HIGH (ref 40–120)
ALP SERPL-CCNC: 240 U/L — HIGH (ref 40–120)
ALP SERPL-CCNC: 266 U/L — HIGH (ref 40–120)
ALP SERPL-CCNC: 303 U/L — HIGH (ref 40–120)
ALP SERPL-CCNC: 348 U/L — HIGH (ref 40–120)
ALP SERPL-CCNC: 362 U/L — HIGH (ref 40–120)
ALT FLD-CCNC: 115 U/L — HIGH (ref 10–45)
ALT FLD-CCNC: 136 U/L — HIGH (ref 10–45)
ALT FLD-CCNC: 140 U/L — HIGH (ref 10–45)
ALT FLD-CCNC: 145 U/L — HIGH (ref 10–45)
ALT FLD-CCNC: 158 U/L — HIGH (ref 10–45)
ALT FLD-CCNC: 164 U/L — HIGH (ref 10–45)
ALT FLD-CCNC: 182 U/L — HIGH (ref 10–45)
AMMONIA BLD-MCNC: 153 UMOL/L — HIGH (ref 11–55)
AMMONIA BLD-MCNC: 47 UMOL/L — SIGNIFICANT CHANGE UP (ref 11–55)
AMMONIA BLD-MCNC: 82 UMOL/L — HIGH (ref 11–55)
AMMONIA BLD-MCNC: 93 UMOL/L — HIGH (ref 11–55)
ANION GAP SERPL CALC-SCNC: 10 MMOL/L — SIGNIFICANT CHANGE UP (ref 5–17)
ANION GAP SERPL CALC-SCNC: 11 MMOL/L — SIGNIFICANT CHANGE UP (ref 5–17)
ANION GAP SERPL CALC-SCNC: 11 MMOL/L — SIGNIFICANT CHANGE UP (ref 5–17)
ANION GAP SERPL CALC-SCNC: 13 MMOL/L — SIGNIFICANT CHANGE UP (ref 5–17)
ANION GAP SERPL CALC-SCNC: 13 MMOL/L — SIGNIFICANT CHANGE UP (ref 5–17)
ANISOCYTOSIS BLD QL: SLIGHT — SIGNIFICANT CHANGE UP
AST SERPL-CCNC: 300 U/L — HIGH (ref 10–40)
AST SERPL-CCNC: 334 U/L — HIGH (ref 10–40)
AST SERPL-CCNC: 342 U/L — HIGH (ref 10–40)
AST SERPL-CCNC: 452 U/L — HIGH (ref 10–40)
AST SERPL-CCNC: 468 U/L — HIGH (ref 10–40)
AST SERPL-CCNC: 627 U/L — HIGH (ref 10–40)
AST SERPL-CCNC: 665 U/L — HIGH (ref 10–40)
BASOPHILS # BLD AUTO: 0 K/UL — SIGNIFICANT CHANGE UP (ref 0–0.2)
BASOPHILS # BLD AUTO: 0 K/UL — SIGNIFICANT CHANGE UP (ref 0–0.2)
BASOPHILS # BLD AUTO: 0.06 K/UL — SIGNIFICANT CHANGE UP (ref 0–0.2)
BASOPHILS # BLD AUTO: 0.09 K/UL — SIGNIFICANT CHANGE UP (ref 0–0.2)
BASOPHILS NFR BLD AUTO: 0 % — SIGNIFICANT CHANGE UP (ref 0–2)
BASOPHILS NFR BLD AUTO: 0 % — SIGNIFICANT CHANGE UP (ref 0–2)
BASOPHILS NFR BLD AUTO: 0.6 % — SIGNIFICANT CHANGE UP (ref 0–2)
BASOPHILS NFR BLD AUTO: 0.7 % — SIGNIFICANT CHANGE UP (ref 0–2)
BILIRUB DIRECT SERPL-MCNC: 1.5 MG/DL — HIGH (ref 0–0.3)
BILIRUB DIRECT SERPL-MCNC: 6 MG/DL — HIGH (ref 0–0.3)
BILIRUB INDIRECT FLD-MCNC: 0.6 MG/DL — SIGNIFICANT CHANGE UP (ref 0.2–1)
BILIRUB SERPL-MCNC: 1.3 MG/DL — HIGH (ref 0.2–1.2)
BILIRUB SERPL-MCNC: 1.8 MG/DL — HIGH (ref 0.2–1.2)
BILIRUB SERPL-MCNC: 2.1 MG/DL — HIGH (ref 0.2–1.2)
BILIRUB SERPL-MCNC: 4.8 MG/DL — HIGH (ref 0.2–1.2)
BILIRUB SERPL-MCNC: 5.4 MG/DL — HIGH (ref 0.2–1.2)
BILIRUB SERPL-MCNC: 7.4 MG/DL — HIGH (ref 0.2–1.2)
BILIRUB SERPL-MCNC: 8 MG/DL — HIGH (ref 0.2–1.2)
BUN SERPL-MCNC: 10 MG/DL — SIGNIFICANT CHANGE UP (ref 7–23)
BUN SERPL-MCNC: 11 MG/DL — SIGNIFICANT CHANGE UP (ref 7–23)
BUN SERPL-MCNC: 11 MG/DL — SIGNIFICANT CHANGE UP (ref 7–23)
BUN SERPL-MCNC: 13 MG/DL — SIGNIFICANT CHANGE UP (ref 7–23)
BUN SERPL-MCNC: 14 MG/DL — SIGNIFICANT CHANGE UP (ref 7–23)
BUN SERPL-MCNC: 8 MG/DL — SIGNIFICANT CHANGE UP (ref 7–23)
BUN SERPL-MCNC: 9 MG/DL — SIGNIFICANT CHANGE UP (ref 7–23)
BURR CELLS BLD QL SMEAR: PRESENT — SIGNIFICANT CHANGE UP
CA-I BLD-SCNC: 1.15 MMOL/L — SIGNIFICANT CHANGE UP (ref 1.15–1.33)
CALCIUM SERPL-MCNC: 6.8 MG/DL — LOW (ref 8.4–10.5)
CALCIUM SERPL-MCNC: 8.2 MG/DL — LOW (ref 8.4–10.5)
CALCIUM SERPL-MCNC: 8.4 MG/DL — SIGNIFICANT CHANGE UP (ref 8.4–10.5)
CALCIUM SERPL-MCNC: 8.8 MG/DL — SIGNIFICANT CHANGE UP (ref 8.4–10.5)
CALCIUM SERPL-MCNC: 8.9 MG/DL — SIGNIFICANT CHANGE UP (ref 8.4–10.5)
CALCIUM SERPL-MCNC: 9.1 MG/DL — SIGNIFICANT CHANGE UP (ref 8.4–10.5)
CALCIUM SERPL-MCNC: 9.2 MG/DL — SIGNIFICANT CHANGE UP (ref 8.4–10.5)
CHLORIDE SERPL-SCNC: 100 MMOL/L — SIGNIFICANT CHANGE UP (ref 96–108)
CHLORIDE SERPL-SCNC: 101 MMOL/L — SIGNIFICANT CHANGE UP (ref 96–108)
CHLORIDE SERPL-SCNC: 102 MMOL/L — SIGNIFICANT CHANGE UP (ref 96–108)
CHLORIDE SERPL-SCNC: 104 MMOL/L — SIGNIFICANT CHANGE UP (ref 96–108)
CHLORIDE SERPL-SCNC: 98 MMOL/L — SIGNIFICANT CHANGE UP (ref 96–108)
CO2 SERPL-SCNC: 22 MMOL/L — SIGNIFICANT CHANGE UP (ref 22–31)
CO2 SERPL-SCNC: 23 MMOL/L — SIGNIFICANT CHANGE UP (ref 22–31)
CO2 SERPL-SCNC: 24 MMOL/L — SIGNIFICANT CHANGE UP (ref 22–31)
CO2 SERPL-SCNC: 25 MMOL/L — SIGNIFICANT CHANGE UP (ref 22–31)
CO2 SERPL-SCNC: 25 MMOL/L — SIGNIFICANT CHANGE UP (ref 22–31)
CO2 SERPL-SCNC: 26 MMOL/L — SIGNIFICANT CHANGE UP (ref 22–31)
CO2 SERPL-SCNC: 26 MMOL/L — SIGNIFICANT CHANGE UP (ref 22–31)
CREAT SERPL-MCNC: 0.41 MG/DL — LOW (ref 0.5–1.3)
CREAT SERPL-MCNC: 0.42 MG/DL — LOW (ref 0.5–1.3)
CREAT SERPL-MCNC: 0.45 MG/DL — LOW (ref 0.5–1.3)
CREAT SERPL-MCNC: 0.52 MG/DL — SIGNIFICANT CHANGE UP (ref 0.5–1.3)
CREAT SERPL-MCNC: 0.55 MG/DL — SIGNIFICANT CHANGE UP (ref 0.5–1.3)
CREAT SERPL-MCNC: 0.55 MG/DL — SIGNIFICANT CHANGE UP (ref 0.5–1.3)
CREAT SERPL-MCNC: 0.57 MG/DL — SIGNIFICANT CHANGE UP (ref 0.5–1.3)
DACRYOCYTES BLD QL SMEAR: SLIGHT — SIGNIFICANT CHANGE UP
EGFR: 110 ML/MIN/1.73M2 — SIGNIFICANT CHANGE UP
EGFR: 111 ML/MIN/1.73M2 — SIGNIFICANT CHANGE UP
EGFR: 111 ML/MIN/1.73M2 — SIGNIFICANT CHANGE UP
EGFR: 112 ML/MIN/1.73M2 — SIGNIFICANT CHANGE UP
EGFR: 116 ML/MIN/1.73M2 — SIGNIFICANT CHANGE UP
EGFR: 118 ML/MIN/1.73M2 — SIGNIFICANT CHANGE UP
EGFR: 119 ML/MIN/1.73M2 — SIGNIFICANT CHANGE UP
EOSINOPHIL # BLD AUTO: 0 K/UL — SIGNIFICANT CHANGE UP (ref 0–0.5)
EOSINOPHIL # BLD AUTO: 0 K/UL — SIGNIFICANT CHANGE UP (ref 0–0.5)
EOSINOPHIL # BLD AUTO: 0.04 K/UL — SIGNIFICANT CHANGE UP (ref 0–0.5)
EOSINOPHIL # BLD AUTO: 0.06 K/UL — SIGNIFICANT CHANGE UP (ref 0–0.5)
EOSINOPHIL NFR BLD AUTO: 0 % — SIGNIFICANT CHANGE UP (ref 0–6)
EOSINOPHIL NFR BLD AUTO: 0 % — SIGNIFICANT CHANGE UP (ref 0–6)
EOSINOPHIL NFR BLD AUTO: 0.3 % — SIGNIFICANT CHANGE UP (ref 0–6)
EOSINOPHIL NFR BLD AUTO: 0.6 % — SIGNIFICANT CHANGE UP (ref 0–6)
GIANT PLATELETS BLD QL SMEAR: PRESENT — SIGNIFICANT CHANGE UP
GLUCOSE BLDC GLUCOMTR-MCNC: 117 MG/DL — HIGH (ref 70–99)
GLUCOSE SERPL-MCNC: 100 MG/DL — HIGH (ref 70–99)
GLUCOSE SERPL-MCNC: 117 MG/DL — HIGH (ref 70–99)
GLUCOSE SERPL-MCNC: 123 MG/DL — HIGH (ref 70–99)
GLUCOSE SERPL-MCNC: 139 MG/DL — HIGH (ref 70–99)
GLUCOSE SERPL-MCNC: 143 MG/DL — HIGH (ref 70–99)
GLUCOSE SERPL-MCNC: 706 MG/DL — CRITICAL HIGH (ref 70–99)
GLUCOSE SERPL-MCNC: 78 MG/DL — SIGNIFICANT CHANGE UP (ref 70–99)
HAPTOGLOB SERPL-MCNC: <20 MG/DL — LOW (ref 34–200)
HCT VFR BLD CALC: 37 % — SIGNIFICANT CHANGE UP (ref 34.5–45)
HCT VFR BLD CALC: 37.7 % — SIGNIFICANT CHANGE UP (ref 34.5–45)
HCT VFR BLD CALC: 39.4 % — SIGNIFICANT CHANGE UP (ref 34.5–45)
HCT VFR BLD CALC: 40.3 % — SIGNIFICANT CHANGE UP (ref 34.5–45)
HCT VFR BLD CALC: 41.4 % — SIGNIFICANT CHANGE UP (ref 34.5–45)
HCT VFR BLD CALC: 43.6 % — SIGNIFICANT CHANGE UP (ref 34.5–45)
HGB BLD-MCNC: 11.7 G/DL — SIGNIFICANT CHANGE UP (ref 11.5–15.5)
HGB BLD-MCNC: 12.3 G/DL — SIGNIFICANT CHANGE UP (ref 11.5–15.5)
HGB BLD-MCNC: 12.9 G/DL — SIGNIFICANT CHANGE UP (ref 11.5–15.5)
HGB BLD-MCNC: 13.2 G/DL — SIGNIFICANT CHANGE UP (ref 11.5–15.5)
HGB BLD-MCNC: 13.2 G/DL — SIGNIFICANT CHANGE UP (ref 11.5–15.5)
HGB BLD-MCNC: 14.1 G/DL — SIGNIFICANT CHANGE UP (ref 11.5–15.5)
IMM GRANULOCYTES NFR BLD AUTO: 4.1 % — HIGH (ref 0–0.9)
IMM GRANULOCYTES NFR BLD AUTO: 5.2 % — HIGH (ref 0–0.9)
LIDOCAIN IGE QN: 12 U/L — SIGNIFICANT CHANGE UP (ref 7–60)
LYMPHOCYTES # BLD AUTO: 0.19 K/UL — LOW (ref 1–3.3)
LYMPHOCYTES # BLD AUTO: 0.21 K/UL — LOW (ref 1–3.3)
LYMPHOCYTES # BLD AUTO: 0.51 K/UL — LOW (ref 1–3.3)
LYMPHOCYTES # BLD AUTO: 0.89 K/UL — LOW (ref 1–3.3)
LYMPHOCYTES # BLD AUTO: 1.8 % — LOW (ref 13–44)
LYMPHOCYTES # BLD AUTO: 2.6 % — LOW (ref 13–44)
LYMPHOCYTES # BLD AUTO: 4.9 % — LOW (ref 13–44)
LYMPHOCYTES # BLD AUTO: 6.5 % — LOW (ref 13–44)
MACROCYTES BLD QL: SLIGHT — SIGNIFICANT CHANGE UP
MAGNESIUM SERPL-MCNC: 1.9 MG/DL — SIGNIFICANT CHANGE UP (ref 1.6–2.6)
MAGNESIUM SERPL-MCNC: 2.3 MG/DL — SIGNIFICANT CHANGE UP (ref 1.6–2.6)
MAGNESIUM SERPL-MCNC: 2.4 MG/DL — SIGNIFICANT CHANGE UP (ref 1.6–2.6)
MANUAL SMEAR VERIFICATION: SIGNIFICANT CHANGE UP
MANUAL SMEAR VERIFICATION: SIGNIFICANT CHANGE UP
MCHC RBC-ENTMCNC: 28.7 PG — SIGNIFICANT CHANGE UP (ref 27–34)
MCHC RBC-ENTMCNC: 28.8 PG — SIGNIFICANT CHANGE UP (ref 27–34)
MCHC RBC-ENTMCNC: 29 PG — SIGNIFICANT CHANGE UP (ref 27–34)
MCHC RBC-ENTMCNC: 29.3 PG — SIGNIFICANT CHANGE UP (ref 27–34)
MCHC RBC-ENTMCNC: 29.3 PG — SIGNIFICANT CHANGE UP (ref 27–34)
MCHC RBC-ENTMCNC: 29.9 PG — SIGNIFICANT CHANGE UP (ref 27–34)
MCHC RBC-ENTMCNC: 31.6 G/DL — LOW (ref 32–36)
MCHC RBC-ENTMCNC: 31.9 G/DL — LOW (ref 32–36)
MCHC RBC-ENTMCNC: 32.3 G/DL — SIGNIFICANT CHANGE UP (ref 32–36)
MCHC RBC-ENTMCNC: 32.6 G/DL — SIGNIFICANT CHANGE UP (ref 32–36)
MCHC RBC-ENTMCNC: 32.7 G/DL — SIGNIFICANT CHANGE UP (ref 32–36)
MCHC RBC-ENTMCNC: 32.8 G/DL — SIGNIFICANT CHANGE UP (ref 32–36)
MCV RBC AUTO: 87.9 FL — SIGNIFICANT CHANGE UP (ref 80–100)
MCV RBC AUTO: 89.5 FL — SIGNIFICANT CHANGE UP (ref 80–100)
MCV RBC AUTO: 89.8 FL — SIGNIFICANT CHANGE UP (ref 80–100)
MCV RBC AUTO: 90.9 FL — SIGNIFICANT CHANGE UP (ref 80–100)
MCV RBC AUTO: 91.4 FL — SIGNIFICANT CHANGE UP (ref 80–100)
MCV RBC AUTO: 92 FL — SIGNIFICANT CHANGE UP (ref 80–100)
MONOCYTES # BLD AUTO: 0.19 K/UL — SIGNIFICANT CHANGE UP (ref 0–0.9)
MONOCYTES # BLD AUTO: 0.71 K/UL — SIGNIFICANT CHANGE UP (ref 0–0.9)
MONOCYTES # BLD AUTO: 0.98 K/UL — HIGH (ref 0–0.9)
MONOCYTES # BLD AUTO: 1.18 K/UL — HIGH (ref 0–0.9)
MONOCYTES NFR BLD AUTO: 1.8 % — LOW (ref 2–14)
MONOCYTES NFR BLD AUTO: 8.6 % — SIGNIFICANT CHANGE UP (ref 2–14)
MONOCYTES NFR BLD AUTO: 8.7 % — SIGNIFICANT CHANGE UP (ref 2–14)
MONOCYTES NFR BLD AUTO: 9.4 % — SIGNIFICANT CHANGE UP (ref 2–14)
MYELOCYTES NFR BLD: 1.8 % — HIGH (ref 0–0)
NEUTROPHILS # BLD AUTO: 10.81 K/UL — HIGH (ref 1.8–7.4)
NEUTROPHILS # BLD AUTO: 7.27 K/UL — SIGNIFICANT CHANGE UP (ref 1.8–7.4)
NEUTROPHILS # BLD AUTO: 8.44 K/UL — HIGH (ref 1.8–7.4)
NEUTROPHILS # BLD AUTO: 9.94 K/UL — HIGH (ref 1.8–7.4)
NEUTROPHILS NFR BLD AUTO: 78.7 % — HIGH (ref 43–77)
NEUTROPHILS NFR BLD AUTO: 80.4 % — HIGH (ref 43–77)
NEUTROPHILS NFR BLD AUTO: 87.8 % — HIGH (ref 43–77)
NEUTROPHILS NFR BLD AUTO: 90.2 % — HIGH (ref 43–77)
NEUTS BAND # BLD: 0.9 % — SIGNIFICANT CHANGE UP (ref 0–8)
NEUTS BAND # BLD: 4.4 % — SIGNIFICANT CHANGE UP (ref 0–8)
NEUTS BAND NFR BLD: 0.9 % — SIGNIFICANT CHANGE UP (ref 0–8)
NEUTS BAND NFR BLD: 4.4 % — SIGNIFICANT CHANGE UP (ref 0–8)
NRBC # BLD: 0 /100 WBCS — SIGNIFICANT CHANGE UP (ref 0–0)
NRBC # BLD: 1 /100 WBCS — HIGH (ref 0–0)
NRBC # BLD: 2 /100 WBCS — HIGH (ref 0–0)
NRBC BLD-RTO: 0 /100 WBCS — SIGNIFICANT CHANGE UP (ref 0–0)
NRBC BLD-RTO: 1 /100 WBCS — HIGH (ref 0–0)
NRBC BLD-RTO: 2 /100 WBCS — HIGH (ref 0–0)
PHOSPHATE SERPL-MCNC: 1.2 MG/DL — LOW (ref 2.5–4.5)
PHOSPHATE SERPL-MCNC: 1.4 MG/DL — LOW (ref 2.5–4.5)
PHOSPHATE SERPL-MCNC: 1.8 MG/DL — LOW (ref 2.5–4.5)
PHOSPHATE SERPL-MCNC: 2.1 MG/DL — LOW (ref 2.5–4.5)
PHOSPHATE SERPL-MCNC: 2.1 MG/DL — LOW (ref 2.5–4.5)
PLAT MORPH BLD: NORMAL — SIGNIFICANT CHANGE UP
PLAT MORPH BLD: NORMAL — SIGNIFICANT CHANGE UP
PLATELET # BLD AUTO: 100 K/UL — LOW (ref 150–400)
PLATELET # BLD AUTO: 112 K/UL — LOW (ref 150–400)
PLATELET # BLD AUTO: 114 K/UL — LOW (ref 150–400)
PLATELET # BLD AUTO: 70 K/UL — LOW (ref 150–400)
PLATELET # BLD AUTO: 79 K/UL — LOW (ref 150–400)
PLATELET # BLD AUTO: 96 K/UL — LOW (ref 150–400)
POIKILOCYTOSIS BLD QL AUTO: SLIGHT — SIGNIFICANT CHANGE UP
POTASSIUM SERPL-MCNC: 3.2 MMOL/L — LOW (ref 3.5–5.3)
POTASSIUM SERPL-MCNC: 3.9 MMOL/L — SIGNIFICANT CHANGE UP (ref 3.5–5.3)
POTASSIUM SERPL-MCNC: 4 MMOL/L — SIGNIFICANT CHANGE UP (ref 3.5–5.3)
POTASSIUM SERPL-MCNC: 4.1 MMOL/L — SIGNIFICANT CHANGE UP (ref 3.5–5.3)
POTASSIUM SERPL-MCNC: 4.2 MMOL/L — SIGNIFICANT CHANGE UP (ref 3.5–5.3)
POTASSIUM SERPL-MCNC: 4.4 MMOL/L — SIGNIFICANT CHANGE UP (ref 3.5–5.3)
POTASSIUM SERPL-MCNC: 4.6 MMOL/L — SIGNIFICANT CHANGE UP (ref 3.5–5.3)
POTASSIUM SERPL-SCNC: 3.2 MMOL/L — LOW (ref 3.5–5.3)
POTASSIUM SERPL-SCNC: 3.9 MMOL/L — SIGNIFICANT CHANGE UP (ref 3.5–5.3)
POTASSIUM SERPL-SCNC: 4 MMOL/L — SIGNIFICANT CHANGE UP (ref 3.5–5.3)
POTASSIUM SERPL-SCNC: 4.1 MMOL/L — SIGNIFICANT CHANGE UP (ref 3.5–5.3)
POTASSIUM SERPL-SCNC: 4.2 MMOL/L — SIGNIFICANT CHANGE UP (ref 3.5–5.3)
POTASSIUM SERPL-SCNC: 4.4 MMOL/L — SIGNIFICANT CHANGE UP (ref 3.5–5.3)
POTASSIUM SERPL-SCNC: 4.6 MMOL/L — SIGNIFICANT CHANGE UP (ref 3.5–5.3)
PROT SERPL-MCNC: 4.3 G/DL — LOW (ref 6–8.3)
PROT SERPL-MCNC: 5.3 G/DL — LOW (ref 6–8.3)
PROT SERPL-MCNC: 5.4 G/DL — LOW (ref 6–8.3)
PROT SERPL-MCNC: 5.5 G/DL — LOW (ref 6–8.3)
PROT SERPL-MCNC: 6 G/DL — SIGNIFICANT CHANGE UP (ref 6–8.3)
PROT SERPL-MCNC: 6 G/DL — SIGNIFICANT CHANGE UP (ref 6–8.3)
PROT SERPL-MCNC: 6.8 G/DL — SIGNIFICANT CHANGE UP (ref 6–8.3)
RBC # BLD: 4.07 M/UL — SIGNIFICANT CHANGE UP (ref 3.8–5.2)
RBC # BLD: 4.2 M/UL — SIGNIFICANT CHANGE UP (ref 3.8–5.2)
RBC # BLD: 4.41 M/UL — SIGNIFICANT CHANGE UP (ref 3.8–5.2)
RBC # BLD: 4.48 M/UL — SIGNIFICANT CHANGE UP (ref 3.8–5.2)
RBC # BLD: 4.5 M/UL — SIGNIFICANT CHANGE UP (ref 3.8–5.2)
RBC # BLD: 4.87 M/UL — SIGNIFICANT CHANGE UP (ref 3.8–5.2)
RBC # FLD: 13.2 % — SIGNIFICANT CHANGE UP (ref 10.3–14.5)
RBC # FLD: 13.3 % — SIGNIFICANT CHANGE UP (ref 10.3–14.5)
RBC # FLD: 13.4 % — SIGNIFICANT CHANGE UP (ref 10.3–14.5)
RBC # FLD: 14.2 % — SIGNIFICANT CHANGE UP (ref 10.3–14.5)
RBC # FLD: 14.7 % — HIGH (ref 10.3–14.5)
RBC # FLD: 15.9 % — HIGH (ref 10.3–14.5)
RBC BLD AUTO: ABNORMAL
RBC BLD AUTO: NORMAL — SIGNIFICANT CHANGE UP
SCHISTOCYTES BLD QL AUTO: SLIGHT — SIGNIFICANT CHANGE UP
SODIUM SERPL-SCNC: 133 MMOL/L — LOW (ref 135–145)
SODIUM SERPL-SCNC: 136 MMOL/L — SIGNIFICANT CHANGE UP (ref 135–145)
SODIUM SERPL-SCNC: 137 MMOL/L — SIGNIFICANT CHANGE UP (ref 135–145)
SODIUM SERPL-SCNC: 139 MMOL/L — SIGNIFICANT CHANGE UP (ref 135–145)
SODIUM SERPL-SCNC: 139 MMOL/L — SIGNIFICANT CHANGE UP (ref 135–145)
SPHEROCYTES BLD QL SMEAR: SLIGHT — SIGNIFICANT CHANGE UP
WBC # BLD: 10.48 K/UL — SIGNIFICANT CHANGE UP (ref 3.8–10.5)
WBC # BLD: 10.51 K/UL — HIGH (ref 3.8–10.5)
WBC # BLD: 13.73 K/UL — HIGH (ref 3.8–10.5)
WBC # BLD: 7.97 K/UL — SIGNIFICANT CHANGE UP (ref 3.8–10.5)
WBC # BLD: 8.2 K/UL — SIGNIFICANT CHANGE UP (ref 3.8–10.5)
WBC # BLD: 8.49 K/UL — SIGNIFICANT CHANGE UP (ref 3.8–10.5)
WBC # FLD AUTO: 10.48 K/UL — SIGNIFICANT CHANGE UP (ref 3.8–10.5)
WBC # FLD AUTO: 10.51 K/UL — HIGH (ref 3.8–10.5)
WBC # FLD AUTO: 13.73 K/UL — HIGH (ref 3.8–10.5)
WBC # FLD AUTO: 7.97 K/UL — SIGNIFICANT CHANGE UP (ref 3.8–10.5)
WBC # FLD AUTO: 8.2 K/UL — SIGNIFICANT CHANGE UP (ref 3.8–10.5)
WBC # FLD AUTO: 8.49 K/UL — SIGNIFICANT CHANGE UP (ref 3.8–10.5)

## 2025-01-01 PROCEDURE — 84100 ASSAY OF PHOSPHORUS: CPT

## 2025-01-01 PROCEDURE — 99285 EMERGENCY DEPT VISIT HI MDM: CPT

## 2025-01-01 PROCEDURE — 85025 COMPLETE CBC W/AUTO DIFF WBC: CPT

## 2025-01-01 PROCEDURE — 99233 SBSQ HOSP IP/OBS HIGH 50: CPT

## 2025-01-01 PROCEDURE — C9399: CPT

## 2025-01-01 PROCEDURE — 99233 SBSQ HOSP IP/OBS HIGH 50: CPT | Mod: GC

## 2025-01-01 PROCEDURE — 74160 CT ABDOMEN W/CONTRAST: CPT | Mod: MC

## 2025-01-01 PROCEDURE — 76705 ECHO EXAM OF ABDOMEN: CPT

## 2025-01-01 PROCEDURE — A9585: CPT

## 2025-01-01 PROCEDURE — 82248 BILIRUBIN DIRECT: CPT

## 2025-01-01 PROCEDURE — 72158 MRI LUMBAR SPINE W/O & W/DYE: CPT | Mod: 26

## 2025-01-01 PROCEDURE — 70450 CT HEAD/BRAIN W/O DYE: CPT | Mod: MC

## 2025-01-01 PROCEDURE — 85027 COMPLETE CBC AUTOMATED: CPT

## 2025-01-01 PROCEDURE — 80053 COMPREHEN METABOLIC PANEL: CPT

## 2025-01-01 PROCEDURE — 83735 ASSAY OF MAGNESIUM: CPT

## 2025-01-01 PROCEDURE — 83010 ASSAY OF HAPTOGLOBIN QUANT: CPT

## 2025-01-01 PROCEDURE — 82330 ASSAY OF CALCIUM: CPT

## 2025-01-01 PROCEDURE — 36415 COLL VENOUS BLD VENIPUNCTURE: CPT

## 2025-01-01 PROCEDURE — 82140 ASSAY OF AMMONIA: CPT

## 2025-01-01 PROCEDURE — 99223 1ST HOSP IP/OBS HIGH 75: CPT

## 2025-01-01 PROCEDURE — 74018 RADEX ABDOMEN 1 VIEW: CPT

## 2025-01-01 PROCEDURE — 96374 THER/PROPH/DIAG INJ IV PUSH: CPT

## 2025-01-01 PROCEDURE — 80048 BASIC METABOLIC PNL TOTAL CA: CPT

## 2025-01-01 PROCEDURE — 72158 MRI LUMBAR SPINE W/O & W/DYE: CPT | Mod: MC

## 2025-01-01 PROCEDURE — 82962 GLUCOSE BLOOD TEST: CPT

## 2025-01-01 PROCEDURE — 99498 ADVNCD CARE PLAN ADDL 30 MIN: CPT | Mod: 25

## 2025-01-01 PROCEDURE — 74018 RADEX ABDOMEN 1 VIEW: CPT | Mod: 26

## 2025-01-01 PROCEDURE — 70450 CT HEAD/BRAIN W/O DYE: CPT | Mod: 26

## 2025-01-01 PROCEDURE — 97162 PT EVAL MOD COMPLEX 30 MIN: CPT

## 2025-01-01 PROCEDURE — 99222 1ST HOSP IP/OBS MODERATE 55: CPT | Mod: GC

## 2025-01-01 PROCEDURE — 74160 CT ABDOMEN W/CONTRAST: CPT | Mod: 26

## 2025-01-01 PROCEDURE — 80076 HEPATIC FUNCTION PANEL: CPT

## 2025-01-01 PROCEDURE — 83690 ASSAY OF LIPASE: CPT

## 2025-01-01 PROCEDURE — 99232 SBSQ HOSP IP/OBS MODERATE 35: CPT

## 2025-01-01 PROCEDURE — 76705 ECHO EXAM OF ABDOMEN: CPT | Mod: 26

## 2025-01-01 PROCEDURE — 96375 TX/PRO/DX INJ NEW DRUG ADDON: CPT

## 2025-01-01 PROCEDURE — 99497 ADVNCD CARE PLAN 30 MIN: CPT | Mod: 25

## 2025-01-01 PROCEDURE — 99231 SBSQ HOSP IP/OBS SF/LOW 25: CPT | Mod: GC

## 2025-01-01 PROCEDURE — 99232 SBSQ HOSP IP/OBS MODERATE 35: CPT | Mod: GC

## 2025-01-01 PROCEDURE — 96376 TX/PRO/DX INJ SAME DRUG ADON: CPT

## 2025-01-01 RX ORDER — ENOXAPARIN SODIUM 100 MG/ML
40 INJECTION SUBCUTANEOUS EVERY 24 HOURS
Refills: 0 | Status: DISCONTINUED | OUTPATIENT
Start: 2025-01-01 | End: 2025-01-01

## 2025-01-01 RX ORDER — HYDROMORPHONE HYDROCHLORIDE 4 MG/ML
0.2 INJECTION, SOLUTION INTRAMUSCULAR; INTRAVENOUS; SUBCUTANEOUS EVERY 4 HOURS
Refills: 0 | Status: DISCONTINUED | OUTPATIENT
Start: 2025-01-01 | End: 2025-01-01

## 2025-01-01 RX ORDER — ALPRAZOLAM 2 MG
2 TABLET ORAL AT BEDTIME
Refills: 0 | Status: DISCONTINUED | OUTPATIENT
Start: 2025-01-01 | End: 2025-01-01

## 2025-01-01 RX ORDER — SODIUM CHLORIDE 9 G/ML
1000 INJECTION, SOLUTION INTRAVENOUS
Refills: 0 | Status: DISCONTINUED | OUTPATIENT
Start: 2025-01-01 | End: 2025-01-01

## 2025-01-01 RX ORDER — METHOCARBAMOL 500 MG
500 TABLET ORAL
Refills: 0 | Status: DISCONTINUED | OUTPATIENT
Start: 2025-01-01 | End: 2025-01-01

## 2025-01-01 RX ORDER — BISACODYL 5 MG
10 TABLET, DELAYED RELEASE (ENTERIC COATED) ORAL DAILY
Refills: 0 | Status: DISCONTINUED | OUTPATIENT
Start: 2025-01-01 | End: 2025-01-01

## 2025-01-01 RX ORDER — HYDROMORPHONE HYDROCHLORIDE 4 MG/ML
0.2 INJECTION, SOLUTION INTRAMUSCULAR; INTRAVENOUS; SUBCUTANEOUS
Refills: 0 | Status: DISCONTINUED | OUTPATIENT
Start: 2025-01-01 | End: 2025-01-01

## 2025-01-01 RX ORDER — CAPECITABINE 150 MG/1
1500 TABLET, FILM COATED ORAL
Refills: 0 | Status: DISCONTINUED | OUTPATIENT
Start: 2025-01-01 | End: 2025-01-01

## 2025-01-01 RX ORDER — HYDROMORPHONE HYDROCHLORIDE 4 MG/ML
0.5 INJECTION, SOLUTION INTRAMUSCULAR; INTRAVENOUS; SUBCUTANEOUS ONCE
Refills: 0 | Status: DISCONTINUED | OUTPATIENT
Start: 2025-01-01 | End: 2025-01-01

## 2025-01-01 RX ORDER — HYDROMORPHONE HYDROCHLORIDE 4 MG/ML
1 INJECTION, SOLUTION INTRAMUSCULAR; INTRAVENOUS; SUBCUTANEOUS
Refills: 0 | Status: DISCONTINUED | OUTPATIENT
Start: 2025-01-01 | End: 2025-01-01

## 2025-01-01 RX ORDER — POLYETHYLENE GLYCOL 3350 17 G/17G
17 POWDER, FOR SOLUTION ORAL
Refills: 0 | Status: DISCONTINUED | OUTPATIENT
Start: 2025-01-01 | End: 2025-01-01

## 2025-01-01 RX ORDER — SOD PHOSPHATE,MONOBASIC-DIBAS 3MMOL/ML
30 VIAL (ML) INTRAVENOUS ONCE
Refills: 0 | Status: COMPLETED | OUTPATIENT
Start: 2025-01-01 | End: 2025-01-01

## 2025-01-01 RX ORDER — DENOSUMAB 120 MG/1.7ML
120 INJECTION SUBCUTANEOUS
Refills: 0 | DISCHARGE

## 2025-01-01 RX ORDER — SODIUM PHOSPHATE, DIBASIC, ANHYDROUS, POTASSIUM PHOSPHATE, MONOBASIC, AND SODIUM PHOSPHATE, MONOBASIC, MONOHYDRATE 852; 155; 130 MG/1; MG/1; MG/1
1 TABLET, COATED ORAL ONCE
Refills: 0 | Status: COMPLETED | OUTPATIENT
Start: 2025-01-01 | End: 2025-01-01

## 2025-01-01 RX ORDER — SERTRALINE HCL 100 MG
100 TABLET ORAL DAILY
Refills: 0 | Status: DISCONTINUED | OUTPATIENT
Start: 2025-01-01 | End: 2025-01-01

## 2025-01-01 RX ORDER — URSODIOL 250 MG/1
250 TABLET, FILM COATED ORAL ONCE
Refills: 0 | Status: DISCONTINUED | OUTPATIENT
Start: 2025-01-01 | End: 2025-01-01

## 2025-01-01 RX ORDER — SERTRALINE HCL 100 MG
1 TABLET ORAL
Refills: 0 | DISCHARGE

## 2025-01-01 RX ORDER — HYDROMORPHONE HYDROCHLORIDE 4 MG/ML
0.5 INJECTION, SOLUTION INTRAMUSCULAR; INTRAVENOUS; SUBCUTANEOUS
Refills: 0 | Status: DISCONTINUED | OUTPATIENT
Start: 2025-01-01 | End: 2025-01-01

## 2025-01-01 RX ORDER — ALPRAZOLAM 2 MG
3 TABLET ORAL AT BEDTIME
Refills: 0 | Status: DISCONTINUED | OUTPATIENT
Start: 2025-01-01 | End: 2025-01-01

## 2025-01-01 RX ORDER — HYDROMORPHONE HYDROCHLORIDE 4 MG/ML
0.5 INJECTION, SOLUTION INTRAMUSCULAR; INTRAVENOUS; SUBCUTANEOUS EVERY 4 HOURS
Refills: 0 | Status: DISCONTINUED | OUTPATIENT
Start: 2025-01-01 | End: 2025-01-01

## 2025-01-01 RX ORDER — OXYCODONE HYDROCHLORIDE 30 MG/1
20 TABLET ORAL EVERY 4 HOURS
Refills: 0 | Status: DISCONTINUED | OUTPATIENT
Start: 2025-01-01 | End: 2025-01-01

## 2025-01-01 RX ORDER — TIZANIDINE HCL 4 MG
2 TABLET ORAL AT BEDTIME
Refills: 0 | Status: DISCONTINUED | OUTPATIENT
Start: 2025-01-01 | End: 2025-01-01

## 2025-01-01 RX ORDER — ALPRAZOLAM 2 MG
1 TABLET ORAL DAILY
Refills: 0 | Status: DISCONTINUED | OUTPATIENT
Start: 2025-01-01 | End: 2025-01-01

## 2025-01-01 RX ORDER — MAGNESIUM HYDROXIDE 400 MG/5ML
30 SUSPENSION, ORAL (FINAL DOSE FORM) ORAL DAILY
Refills: 0 | Status: DISCONTINUED | OUTPATIENT
Start: 2025-01-01 | End: 2025-01-01

## 2025-01-01 RX ORDER — OXYCODONE HYDROCHLORIDE 30 MG/1
10 TABLET ORAL EVERY 4 HOURS
Refills: 0 | Status: DISCONTINUED | OUTPATIENT
Start: 2025-01-01 | End: 2025-01-01

## 2025-01-01 RX ORDER — OXYCODONE HYDROCHLORIDE 30 MG/1
10 TABLET ORAL EVERY 12 HOURS
Refills: 0 | Status: DISCONTINUED | OUTPATIENT
Start: 2025-01-01 | End: 2025-01-01

## 2025-01-01 RX ORDER — LACTULOSE 10 G/15 ML
20 SOLUTION, ORAL ORAL
Refills: 0 | Status: DISCONTINUED | OUTPATIENT
Start: 2025-01-01 | End: 2025-01-01

## 2025-01-01 RX ORDER — SODIUM CHLORIDE 9 G/ML
1000 INJECTION, SOLUTION INTRAVENOUS ONCE
Refills: 0 | Status: COMPLETED | OUTPATIENT
Start: 2025-01-01 | End: 2025-01-01

## 2025-01-01 RX ORDER — OXYCODONE HYDROCHLORIDE 30 MG/1
15 TABLET ORAL EVERY 4 HOURS
Refills: 0 | Status: DISCONTINUED | OUTPATIENT
Start: 2025-01-01 | End: 2025-01-01

## 2025-01-01 RX ORDER — ACETAMINOPHEN 160 MG/5ML
650 SUSPENSION ORAL EVERY 6 HOURS
Refills: 0 | Status: DISCONTINUED | OUTPATIENT
Start: 2025-01-01 | End: 2025-01-01

## 2025-01-01 RX ORDER — HYDROMORPHONE HYDROCHLORIDE 4 MG/ML
0.5 INJECTION, SOLUTION INTRAMUSCULAR; INTRAVENOUS; SUBCUTANEOUS EVERY 6 HOURS
Refills: 0 | Status: DISCONTINUED | OUTPATIENT
Start: 2025-01-01 | End: 2025-01-01

## 2025-01-01 RX ORDER — POLYETHYLENE GLYCOL 3350 420 G/4L
4000 POWDER, FOR SOLUTION ORAL ONCE
Refills: 0 | Status: COMPLETED | OUTPATIENT
Start: 2025-01-01 | End: 2025-01-01

## 2025-01-01 RX ORDER — MAGNESIUM, ALUMINUM HYDROXIDE 200-225/5
30 SUSPENSION, ORAL (FINAL DOSE FORM) ORAL EVERY 4 HOURS
Refills: 0 | Status: DISCONTINUED | OUTPATIENT
Start: 2025-01-01 | End: 2025-01-01

## 2025-01-01 RX ORDER — SODIUM CHLORIDE 9 G/ML
1000 INJECTION, SOLUTION INTRAVENOUS
Refills: 0 | Status: COMPLETED | OUTPATIENT
Start: 2025-01-01 | End: 2025-01-01

## 2025-01-01 RX ORDER — NALOXONE HYDROCHLORIDE 3 MG/.1ML
0.1 SPRAY NASAL
Refills: 0 | Status: DISCONTINUED | OUTPATIENT
Start: 2025-01-01 | End: 2025-01-01

## 2025-01-01 RX ORDER — SODIUM CHLORIDE 0.4 %
1 AEROSOL, SPRAY (ML) NASAL THREE TIMES A DAY
Refills: 0 | Status: DISCONTINUED | OUTPATIENT
Start: 2025-01-01 | End: 2025-01-01

## 2025-01-01 RX ORDER — SENNOSIDES 8.6 MG
2 TABLET ORAL AT BEDTIME
Refills: 0 | Status: DISCONTINUED | OUTPATIENT
Start: 2025-01-01 | End: 2025-01-01

## 2025-01-01 RX ORDER — ONDANSETRON 4 MG/1
4 TABLET, ORALLY DISINTEGRATING ORAL EVERY 8 HOURS
Refills: 0 | Status: DISCONTINUED | OUTPATIENT
Start: 2025-01-01 | End: 2025-01-01

## 2025-01-01 RX ORDER — MORPHINE SULFATE 60 MG/1
2 TABLET, FILM COATED, EXTENDED RELEASE ORAL ONCE
Refills: 0 | Status: DISCONTINUED | OUTPATIENT
Start: 2025-01-01 | End: 2025-01-01

## 2025-01-01 RX ORDER — CAPECITABINE 150 MG/1
3 TABLET, FILM COATED ORAL
Refills: 0 | DISCHARGE

## 2025-01-01 RX ORDER — ALPRAZOLAM 2 MG
2 TABLET ORAL ONCE
Refills: 0 | Status: DISCONTINUED | OUTPATIENT
Start: 2025-01-01 | End: 2025-01-01

## 2025-01-01 RX ORDER — OXYCODONE HYDROCHLORIDE 30 MG/1
20 TABLET ORAL EVERY 12 HOURS
Refills: 0 | Status: DISCONTINUED | OUTPATIENT
Start: 2025-01-01 | End: 2025-01-01

## 2025-01-01 RX ORDER — FULVESTRANT 50 MG/ML
500 INJECTION INTRAMUSCULAR
Refills: 0 | DISCHARGE

## 2025-01-01 RX ORDER — OXYCODONE HYDROCHLORIDE 30 MG/1
5 TABLET ORAL EVERY 4 HOURS
Refills: 0 | Status: DISCONTINUED | OUTPATIENT
Start: 2025-01-01 | End: 2025-01-01

## 2025-01-01 RX ORDER — HYDROMORPHONE HYDROCHLORIDE 4 MG/ML
0.8 INJECTION, SOLUTION INTRAMUSCULAR; INTRAVENOUS; SUBCUTANEOUS EVERY 4 HOURS
Refills: 0 | Status: DISCONTINUED | OUTPATIENT
Start: 2025-01-01 | End: 2025-01-01

## 2025-01-01 RX ORDER — ALPRAZOLAM 2 MG
0 TABLET ORAL
Refills: 0 | DISCHARGE

## 2025-01-01 RX ORDER — ACETAMINOPHEN, DIPHENHYDRAMINE HCL, PHENYLEPHRINE HCL 325; 25; 5 MG/1; MG/1; MG/1
3 TABLET ORAL ONCE
Refills: 0 | Status: DISCONTINUED | OUTPATIENT
Start: 2025-01-01 | End: 2025-01-01

## 2025-01-01 RX ORDER — POTASSIUM PHOSPHATE, MONOBASIC POTASSIUM PHOSPHATE, DIBASIC 236; 224 MG/ML; MG/ML
30 INJECTION, SOLUTION INTRAVENOUS ONCE
Refills: 0 | Status: COMPLETED | OUTPATIENT
Start: 2025-01-01 | End: 2025-01-01

## 2025-01-01 RX ORDER — OXYCODONE HYDROCHLORIDE 30 MG/1
25 TABLET ORAL EVERY 4 HOURS
Refills: 0 | Status: DISCONTINUED | OUTPATIENT
Start: 2025-01-01 | End: 2025-01-01

## 2025-01-01 RX ORDER — ONDANSETRON 4 MG/1
4 TABLET, ORALLY DISINTEGRATING ORAL ONCE
Refills: 0 | Status: COMPLETED | OUTPATIENT
Start: 2025-01-01 | End: 2025-01-01

## 2025-01-01 RX ADMIN — ENOXAPARIN SODIUM 40 MILLIGRAM(S): 100 INJECTION SUBCUTANEOUS at 17:25

## 2025-01-01 RX ADMIN — SODIUM CHLORIDE 1000 MILLILITER(S): 9 INJECTION, SOLUTION INTRAVENOUS at 02:40

## 2025-01-01 RX ADMIN — OXYCODONE HYDROCHLORIDE 10 MILLIGRAM(S): 30 TABLET ORAL at 16:04

## 2025-01-01 RX ADMIN — Medication 2000 UNIT(S): at 17:41

## 2025-01-01 RX ADMIN — SODIUM CHLORIDE 85 MILLILITER(S): 9 INJECTION, SOLUTION INTRAVENOUS at 09:10

## 2025-01-01 RX ADMIN — SODIUM PHOSPHATE, DIBASIC, ANHYDROUS, POTASSIUM PHOSPHATE, MONOBASIC, AND SODIUM PHOSPHATE, MONOBASIC, MONOHYDRATE 1 PACKET(S): 852; 155; 130 TABLET, COATED ORAL at 05:20

## 2025-01-01 RX ADMIN — HYDROMORPHONE HYDROCHLORIDE 0.5 MILLIGRAM(S): 4 INJECTION, SOLUTION INTRAMUSCULAR; INTRAVENOUS; SUBCUTANEOUS at 05:02

## 2025-01-01 RX ADMIN — HYDROMORPHONE HYDROCHLORIDE 0.5 MILLIGRAM(S): 4 INJECTION, SOLUTION INTRAMUSCULAR; INTRAVENOUS; SUBCUTANEOUS at 10:58

## 2025-01-01 RX ADMIN — HYDROMORPHONE HYDROCHLORIDE 0.5 MILLIGRAM(S): 4 INJECTION, SOLUTION INTRAMUSCULAR; INTRAVENOUS; SUBCUTANEOUS at 17:43

## 2025-01-01 RX ADMIN — MORPHINE SULFATE 2 MILLIGRAM(S): 60 TABLET, FILM COATED, EXTENDED RELEASE ORAL at 03:48

## 2025-01-01 RX ADMIN — HYDROMORPHONE HYDROCHLORIDE 0.5 MILLIGRAM(S): 4 INJECTION, SOLUTION INTRAMUSCULAR; INTRAVENOUS; SUBCUTANEOUS at 08:33

## 2025-01-01 RX ADMIN — HYDROMORPHONE HYDROCHLORIDE 0.5 MILLIGRAM(S): 4 INJECTION, SOLUTION INTRAMUSCULAR; INTRAVENOUS; SUBCUTANEOUS at 15:41

## 2025-01-01 RX ADMIN — HYDROMORPHONE HYDROCHLORIDE 0.5 MILLIGRAM(S): 4 INJECTION, SOLUTION INTRAMUSCULAR; INTRAVENOUS; SUBCUTANEOUS at 19:29

## 2025-01-01 RX ADMIN — HYDROMORPHONE HYDROCHLORIDE 0.5 MILLIGRAM(S): 4 INJECTION, SOLUTION INTRAMUSCULAR; INTRAVENOUS; SUBCUTANEOUS at 10:12

## 2025-01-01 RX ADMIN — POLYETHYLENE GLYCOL 3350 17 GRAM(S): 17 POWDER, FOR SOLUTION ORAL at 10:42

## 2025-01-01 RX ADMIN — HYDROMORPHONE HYDROCHLORIDE 0.8 MILLIGRAM(S): 4 INJECTION, SOLUTION INTRAMUSCULAR; INTRAVENOUS; SUBCUTANEOUS at 20:13

## 2025-01-01 RX ADMIN — POLYETHYLENE GLYCOL 3350 17 GRAM(S): 17 POWDER, FOR SOLUTION ORAL at 17:55

## 2025-01-01 RX ADMIN — ENOXAPARIN SODIUM 40 MILLIGRAM(S): 100 INJECTION SUBCUTANEOUS at 16:33

## 2025-01-01 RX ADMIN — MORPHINE SULFATE 2 MILLIGRAM(S): 60 TABLET, FILM COATED, EXTENDED RELEASE ORAL at 02:39

## 2025-01-01 RX ADMIN — Medication 2 TABLET(S): at 23:13

## 2025-01-01 RX ADMIN — OXYCODONE HYDROCHLORIDE 15 MILLIGRAM(S): 30 TABLET ORAL at 10:16

## 2025-01-01 RX ADMIN — HYDROMORPHONE HYDROCHLORIDE 0.5 MILLIGRAM(S): 4 INJECTION, SOLUTION INTRAMUSCULAR; INTRAVENOUS; SUBCUTANEOUS at 13:30

## 2025-01-01 RX ADMIN — Medication 2000 UNIT(S): at 10:42

## 2025-01-01 RX ADMIN — Medication 2 TABLET(S): at 21:19

## 2025-01-01 RX ADMIN — SODIUM CHLORIDE 85 MILLILITER(S): 9 INJECTION, SOLUTION INTRAVENOUS at 17:27

## 2025-01-01 RX ADMIN — ENOXAPARIN SODIUM 40 MILLIGRAM(S): 100 INJECTION SUBCUTANEOUS at 16:04

## 2025-01-01 RX ADMIN — HYDROMORPHONE HYDROCHLORIDE 0.5 MILLIGRAM(S): 4 INJECTION, SOLUTION INTRAMUSCULAR; INTRAVENOUS; SUBCUTANEOUS at 09:20

## 2025-01-01 RX ADMIN — POLYETHYLENE GLYCOL 3350 17 GRAM(S): 17 POWDER, FOR SOLUTION ORAL at 17:43

## 2025-01-01 RX ADMIN — HYDROMORPHONE HYDROCHLORIDE 0.5 MILLIGRAM(S): 4 INJECTION, SOLUTION INTRAMUSCULAR; INTRAVENOUS; SUBCUTANEOUS at 13:55

## 2025-01-01 RX ADMIN — HYDROMORPHONE HYDROCHLORIDE 0.5 MILLIGRAM(S): 4 INJECTION, SOLUTION INTRAMUSCULAR; INTRAVENOUS; SUBCUTANEOUS at 05:36

## 2025-01-01 RX ADMIN — OXYCODONE HYDROCHLORIDE 15 MILLIGRAM(S): 30 TABLET ORAL at 17:20

## 2025-01-01 RX ADMIN — HYDROMORPHONE HYDROCHLORIDE 0.8 MILLIGRAM(S): 4 INJECTION, SOLUTION INTRAMUSCULAR; INTRAVENOUS; SUBCUTANEOUS at 21:00

## 2025-01-01 RX ADMIN — HYDROMORPHONE HYDROCHLORIDE 0.5 MILLIGRAM(S): 4 INJECTION, SOLUTION INTRAMUSCULAR; INTRAVENOUS; SUBCUTANEOUS at 09:52

## 2025-01-01 RX ADMIN — OXYCODONE HYDROCHLORIDE 10 MILLIGRAM(S): 30 TABLET ORAL at 18:50

## 2025-01-01 RX ADMIN — CAPECITABINE 1500 MILLIGRAM(S): 150 TABLET, FILM COATED ORAL at 14:46

## 2025-01-01 RX ADMIN — HYDROMORPHONE HYDROCHLORIDE 0.5 MILLIGRAM(S): 4 INJECTION, SOLUTION INTRAMUSCULAR; INTRAVENOUS; SUBCUTANEOUS at 18:05

## 2025-01-01 RX ADMIN — ENOXAPARIN SODIUM 40 MILLIGRAM(S): 100 INJECTION SUBCUTANEOUS at 17:42

## 2025-01-01 RX ADMIN — OXYCODONE HYDROCHLORIDE 10 MILLIGRAM(S): 30 TABLET ORAL at 17:21

## 2025-01-01 RX ADMIN — Medication 2000 UNIT(S): at 18:45

## 2025-01-01 RX ADMIN — HYDROMORPHONE HYDROCHLORIDE 0.8 MILLIGRAM(S): 4 INJECTION, SOLUTION INTRAMUSCULAR; INTRAVENOUS; SUBCUTANEOUS at 17:13

## 2025-01-01 RX ADMIN — OXYCODONE HYDROCHLORIDE 10 MILLIGRAM(S): 30 TABLET ORAL at 09:09

## 2025-01-01 RX ADMIN — HYDROMORPHONE HYDROCHLORIDE 0.8 MILLIGRAM(S): 4 INJECTION, SOLUTION INTRAMUSCULAR; INTRAVENOUS; SUBCUTANEOUS at 05:15

## 2025-01-01 RX ADMIN — Medication 2 TABLET(S): at 21:38

## 2025-01-01 RX ADMIN — OXYCODONE HYDROCHLORIDE 10 MILLIGRAM(S): 30 TABLET ORAL at 17:04

## 2025-01-01 RX ADMIN — Medication 100 MILLIGRAM(S): at 12:43

## 2025-01-01 RX ADMIN — HYDROMORPHONE HYDROCHLORIDE 0.5 MILLIGRAM(S): 4 INJECTION, SOLUTION INTRAMUSCULAR; INTRAVENOUS; SUBCUTANEOUS at 23:41

## 2025-01-01 RX ADMIN — HYDROMORPHONE HYDROCHLORIDE 0.5 MILLIGRAM(S): 4 INJECTION, SOLUTION INTRAMUSCULAR; INTRAVENOUS; SUBCUTANEOUS at 09:03

## 2025-01-01 RX ADMIN — OXYCODONE HYDROCHLORIDE 10 MILLIGRAM(S): 30 TABLET ORAL at 17:48

## 2025-01-01 RX ADMIN — Medication 2000 UNIT(S): at 12:43

## 2025-01-01 RX ADMIN — HYDROMORPHONE HYDROCHLORIDE 0.5 MILLIGRAM(S): 4 INJECTION, SOLUTION INTRAMUSCULAR; INTRAVENOUS; SUBCUTANEOUS at 06:41

## 2025-01-01 RX ADMIN — OXYCODONE HYDROCHLORIDE 15 MILLIGRAM(S): 30 TABLET ORAL at 10:45

## 2025-01-01 RX ADMIN — OXYCODONE HYDROCHLORIDE 10 MILLIGRAM(S): 30 TABLET ORAL at 18:25

## 2025-01-01 RX ADMIN — SODIUM CHLORIDE 2000 MILLILITER(S): 9 INJECTION, SOLUTION INTRAVENOUS at 14:35

## 2025-01-01 RX ADMIN — OXYCODONE HYDROCHLORIDE 15 MILLIGRAM(S): 30 TABLET ORAL at 00:15

## 2025-01-01 RX ADMIN — HYDROMORPHONE HYDROCHLORIDE 1 MILLIGRAM(S): 4 INJECTION, SOLUTION INTRAMUSCULAR; INTRAVENOUS; SUBCUTANEOUS at 21:46

## 2025-01-01 RX ADMIN — Medication 2 MILLIGRAM(S): at 21:51

## 2025-01-01 RX ADMIN — Medication 100 MILLIGRAM(S): at 17:42

## 2025-01-01 RX ADMIN — OXYCODONE HYDROCHLORIDE 15 MILLIGRAM(S): 30 TABLET ORAL at 10:50

## 2025-01-01 RX ADMIN — Medication 2 MILLIGRAM(S): at 22:22

## 2025-01-01 RX ADMIN — HYDROMORPHONE HYDROCHLORIDE 1 MILLIGRAM(S): 4 INJECTION, SOLUTION INTRAMUSCULAR; INTRAVENOUS; SUBCUTANEOUS at 15:08

## 2025-01-01 RX ADMIN — SODIUM CHLORIDE 85 MILLILITER(S): 9 INJECTION, SOLUTION INTRAVENOUS at 17:43

## 2025-01-01 RX ADMIN — Medication 85 MILLIMOLE(S): at 10:52

## 2025-01-01 RX ADMIN — ENOXAPARIN SODIUM 40 MILLIGRAM(S): 100 INJECTION SUBCUTANEOUS at 17:55

## 2025-01-01 RX ADMIN — HYDROMORPHONE HYDROCHLORIDE 0.5 MILLIGRAM(S): 4 INJECTION, SOLUTION INTRAMUSCULAR; INTRAVENOUS; SUBCUTANEOUS at 18:52

## 2025-01-01 RX ADMIN — Medication 2000 UNIT(S): at 11:37

## 2025-01-01 RX ADMIN — HYDROMORPHONE HYDROCHLORIDE 1 MILLIGRAM(S): 4 INJECTION, SOLUTION INTRAMUSCULAR; INTRAVENOUS; SUBCUTANEOUS at 20:01

## 2025-01-01 RX ADMIN — CAPECITABINE 1500 MILLIGRAM(S): 150 TABLET, FILM COATED ORAL at 11:02

## 2025-01-01 RX ADMIN — OXYCODONE HYDROCHLORIDE 10 MILLIGRAM(S): 30 TABLET ORAL at 22:30

## 2025-01-01 RX ADMIN — HYDROMORPHONE HYDROCHLORIDE 0.8 MILLIGRAM(S): 4 INJECTION, SOLUTION INTRAMUSCULAR; INTRAVENOUS; SUBCUTANEOUS at 07:35

## 2025-01-01 RX ADMIN — Medication 100 MILLIGRAM(S): at 14:53

## 2025-01-01 RX ADMIN — Medication 2 TABLET(S): at 21:51

## 2025-01-01 RX ADMIN — HYDROMORPHONE HYDROCHLORIDE 0.5 MILLIGRAM(S): 4 INJECTION, SOLUTION INTRAMUSCULAR; INTRAVENOUS; SUBCUTANEOUS at 12:53

## 2025-01-01 RX ADMIN — Medication 2 MILLIGRAM(S): at 21:18

## 2025-01-01 RX ADMIN — OXYCODONE HYDROCHLORIDE 15 MILLIGRAM(S): 30 TABLET ORAL at 16:49

## 2025-01-01 RX ADMIN — OXYCODONE HYDROCHLORIDE 15 MILLIGRAM(S): 30 TABLET ORAL at 10:20

## 2025-01-01 RX ADMIN — HYDROMORPHONE HYDROCHLORIDE 0.5 MILLIGRAM(S): 4 INJECTION, SOLUTION INTRAMUSCULAR; INTRAVENOUS; SUBCUTANEOUS at 11:30

## 2025-01-01 RX ADMIN — OXYCODONE HYDROCHLORIDE 10 MILLIGRAM(S): 30 TABLET ORAL at 05:20

## 2025-01-01 RX ADMIN — POLYETHYLENE GLYCOL 3350 17 GRAM(S): 17 POWDER, FOR SOLUTION ORAL at 17:41

## 2025-01-01 RX ADMIN — OXYCODONE HYDROCHLORIDE 10 MILLIGRAM(S): 30 TABLET ORAL at 22:45

## 2025-01-01 RX ADMIN — Medication 2 MILLIGRAM(S): at 01:41

## 2025-01-01 RX ADMIN — Medication 100 MILLIGRAM(S): at 11:37

## 2025-01-01 RX ADMIN — HYDROMORPHONE HYDROCHLORIDE 0.5 MILLIGRAM(S): 4 INJECTION, SOLUTION INTRAMUSCULAR; INTRAVENOUS; SUBCUTANEOUS at 12:10

## 2025-01-01 RX ADMIN — Medication 20 GRAM(S): at 17:00

## 2025-01-01 RX ADMIN — Medication 2 MILLIGRAM(S): at 01:18

## 2025-01-01 RX ADMIN — ONDANSETRON 4 MILLIGRAM(S): 4 TABLET, ORALLY DISINTEGRATING ORAL at 02:39

## 2025-01-01 RX ADMIN — Medication 20 GRAM(S): at 10:42

## 2025-01-01 RX ADMIN — ENOXAPARIN SODIUM 40 MILLIGRAM(S): 100 INJECTION SUBCUTANEOUS at 17:46

## 2025-01-01 RX ADMIN — Medication 2 TABLET(S): at 21:09

## 2025-01-01 RX ADMIN — HYDROMORPHONE HYDROCHLORIDE 0.5 MILLIGRAM(S): 4 INJECTION, SOLUTION INTRAMUSCULAR; INTRAVENOUS; SUBCUTANEOUS at 13:15

## 2025-01-01 RX ADMIN — POLYETHYLENE GLYCOL 3350 17 GRAM(S): 17 POWDER, FOR SOLUTION ORAL at 16:37

## 2025-01-01 RX ADMIN — Medication 2000 UNIT(S): at 16:32

## 2025-01-01 RX ADMIN — HYDROMORPHONE HYDROCHLORIDE 0.5 MILLIGRAM(S): 4 INJECTION, SOLUTION INTRAMUSCULAR; INTRAVENOUS; SUBCUTANEOUS at 17:26

## 2025-01-01 RX ADMIN — OXYCODONE HYDROCHLORIDE 15 MILLIGRAM(S): 30 TABLET ORAL at 23:12

## 2025-01-01 RX ADMIN — OXYCODONE HYDROCHLORIDE 10 MILLIGRAM(S): 30 TABLET ORAL at 10:41

## 2025-01-01 RX ADMIN — HYDROMORPHONE HYDROCHLORIDE 0.5 MILLIGRAM(S): 4 INJECTION, SOLUTION INTRAMUSCULAR; INTRAVENOUS; SUBCUTANEOUS at 11:08

## 2025-01-01 RX ADMIN — HYDROMORPHONE HYDROCHLORIDE 0.5 MILLIGRAM(S): 4 INJECTION, SOLUTION INTRAMUSCULAR; INTRAVENOUS; SUBCUTANEOUS at 08:52

## 2025-01-01 RX ADMIN — HYDROMORPHONE HYDROCHLORIDE 0.5 MILLIGRAM(S): 4 INJECTION, SOLUTION INTRAMUSCULAR; INTRAVENOUS; SUBCUTANEOUS at 06:55

## 2025-01-01 RX ADMIN — OXYCODONE HYDROCHLORIDE 10 MILLIGRAM(S): 30 TABLET ORAL at 21:30

## 2025-01-01 RX ADMIN — Medication 20 GRAM(S): at 21:09

## 2025-01-01 RX ADMIN — OXYCODONE HYDROCHLORIDE 10 MILLIGRAM(S): 30 TABLET ORAL at 11:42

## 2025-01-01 RX ADMIN — OXYCODONE HYDROCHLORIDE 10 MILLIGRAM(S): 30 TABLET ORAL at 05:40

## 2025-01-01 RX ADMIN — ENOXAPARIN SODIUM 40 MILLIGRAM(S): 100 INJECTION SUBCUTANEOUS at 17:52

## 2025-01-01 RX ADMIN — Medication 100 MILLIGRAM(S): at 16:32

## 2025-01-01 RX ADMIN — HYDROMORPHONE HYDROCHLORIDE 0.5 MILLIGRAM(S): 4 INJECTION, SOLUTION INTRAMUSCULAR; INTRAVENOUS; SUBCUTANEOUS at 21:18

## 2025-01-01 RX ADMIN — Medication 1 SPRAY(S): at 21:51

## 2025-01-01 RX ADMIN — OXYCODONE HYDROCHLORIDE 10 MILLIGRAM(S): 30 TABLET ORAL at 05:45

## 2025-01-01 RX ADMIN — CAPECITABINE 1500 MILLIGRAM(S): 150 TABLET, FILM COATED ORAL at 23:51

## 2025-01-01 RX ADMIN — CAPECITABINE 1500 MILLIGRAM(S): 150 TABLET, FILM COATED ORAL at 21:22

## 2025-01-01 RX ADMIN — Medication 100 MILLIGRAM(S): at 18:45

## 2025-01-01 RX ADMIN — Medication 2 TABLET(S): at 21:46

## 2025-01-01 RX ADMIN — POTASSIUM PHOSPHATE, MONOBASIC POTASSIUM PHOSPHATE, DIBASIC 83.33 MILLIMOLE(S): 236; 224 INJECTION, SOLUTION INTRAVENOUS at 09:27

## 2025-01-01 RX ADMIN — OXYCODONE HYDROCHLORIDE 10 MILLIGRAM(S): 30 TABLET ORAL at 17:53

## 2025-01-01 RX ADMIN — Medication 2 MILLIGRAM(S): at 22:38

## 2025-01-01 RX ADMIN — SODIUM CHLORIDE 100 MILLILITER(S): 9 INJECTION, SOLUTION INTRAVENOUS at 11:38

## 2025-01-01 RX ADMIN — SODIUM CHLORIDE 85 MILLILITER(S): 9 INJECTION, SOLUTION INTRAVENOUS at 17:45

## 2025-01-01 RX ADMIN — OXYCODONE HYDROCHLORIDE 10 MILLIGRAM(S): 30 TABLET ORAL at 23:54

## 2025-01-01 RX ADMIN — HYDROMORPHONE HYDROCHLORIDE 0.5 MILLIGRAM(S): 4 INJECTION, SOLUTION INTRAMUSCULAR; INTRAVENOUS; SUBCUTANEOUS at 11:50

## 2025-01-01 RX ADMIN — POLYETHYLENE GLYCOL 3350 4000 MILLILITER(S): 420 POWDER, FOR SOLUTION ORAL at 21:30

## 2025-01-01 RX ADMIN — HYDROMORPHONE HYDROCHLORIDE 0.5 MILLIGRAM(S): 4 INJECTION, SOLUTION INTRAMUSCULAR; INTRAVENOUS; SUBCUTANEOUS at 23:20

## 2025-01-01 RX ADMIN — SODIUM CHLORIDE 85 MILLILITER(S): 9 INJECTION, SOLUTION INTRAVENOUS at 06:46

## 2025-01-01 RX ADMIN — OXYCODONE HYDROCHLORIDE 15 MILLIGRAM(S): 30 TABLET ORAL at 06:35

## 2025-01-01 RX ADMIN — Medication 2 MILLIGRAM(S): at 22:50

## 2025-01-01 RX ADMIN — OXYCODONE HYDROCHLORIDE 10 MILLIGRAM(S): 30 TABLET ORAL at 09:55

## 2025-01-01 RX ADMIN — Medication 2000 UNIT(S): at 14:53

## 2025-01-01 RX ADMIN — HYDROMORPHONE HYDROCHLORIDE 0.5 MILLIGRAM(S): 4 INJECTION, SOLUTION INTRAMUSCULAR; INTRAVENOUS; SUBCUTANEOUS at 21:10

## 2025-01-01 RX ADMIN — HYDROMORPHONE HYDROCHLORIDE 0.5 MILLIGRAM(S): 4 INJECTION, SOLUTION INTRAMUSCULAR; INTRAVENOUS; SUBCUTANEOUS at 16:22

## 2025-01-01 RX ADMIN — OXYCODONE HYDROCHLORIDE 10 MILLIGRAM(S): 30 TABLET ORAL at 06:30

## 2025-01-01 RX ADMIN — POLYETHYLENE GLYCOL 3350 17 GRAM(S): 17 POWDER, FOR SOLUTION ORAL at 17:46

## 2025-01-01 RX ADMIN — HYDROMORPHONE HYDROCHLORIDE 0.5 MILLIGRAM(S): 4 INJECTION, SOLUTION INTRAMUSCULAR; INTRAVENOUS; SUBCUTANEOUS at 22:50

## 2025-01-01 RX ADMIN — HYDROMORPHONE HYDROCHLORIDE 0.5 MILLIGRAM(S): 4 INJECTION, SOLUTION INTRAMUSCULAR; INTRAVENOUS; SUBCUTANEOUS at 11:28

## 2025-01-02 ENCOUNTER — TRANSCRIPTION ENCOUNTER (OUTPATIENT)
Age: 50
End: 2025-01-02

## 2025-01-03 ENCOUNTER — TRANSCRIPTION ENCOUNTER (OUTPATIENT)
Age: 50
End: 2025-01-03

## 2025-01-07 RX ORDER — CAPECITABINE 500 MG/1
500 TABLET ORAL TWICE DAILY
Qty: 84 | Refills: 2 | Status: ACTIVE | COMMUNITY
Start: 2025-01-07 | End: 1900-01-01

## 2025-01-08 ENCOUNTER — NON-APPOINTMENT (OUTPATIENT)
Age: 50
End: 2025-01-08

## 2025-01-08 DIAGNOSIS — R10.9 UNSPECIFIED ABDOMINAL PAIN: ICD-10-CM

## 2025-01-13 ENCOUNTER — NON-APPOINTMENT (OUTPATIENT)
Age: 50
End: 2025-01-13

## 2025-01-13 ENCOUNTER — EMERGENCY (EMERGENCY)
Facility: HOSPITAL | Age: 50
LOS: 1 days | Discharge: LEFT BEFORE DISCHARGE | End: 2025-01-13
Admitting: EMERGENCY MEDICINE
Payer: COMMERCIAL

## 2025-01-13 VITALS
HEIGHT: 65 IN | RESPIRATION RATE: 18 BRPM | SYSTOLIC BLOOD PRESSURE: 122 MMHG | WEIGHT: 117.95 LBS | DIASTOLIC BLOOD PRESSURE: 82 MMHG | TEMPERATURE: 98 F | OXYGEN SATURATION: 98 % | HEART RATE: 85 BPM

## 2025-01-13 DIAGNOSIS — Z98.890 OTHER SPECIFIED POSTPROCEDURAL STATES: Chronic | ICD-10-CM

## 2025-01-13 DIAGNOSIS — Z90.49 ACQUIRED ABSENCE OF OTHER SPECIFIED PARTS OF DIGESTIVE TRACT: Chronic | ICD-10-CM

## 2025-01-13 PROCEDURE — L9991: CPT

## 2025-01-13 NOTE — CHART NOTE - NSCHARTNOTEFT_GEN_A_CORE
Informed by outpatient oncologist that pt was sent to ED. Pt has metastatic breast cancer to the bones: progressed on 2 prior lines of endocrine therapy: Kisqali (ribociclib and Faslodex) and then the Truqap (capivasertib) and Faslodex. Last Pet/ CT done 12/29/2024 showing new liver lesions, RP adenopathy, progression of bone metastases. She was planned to switch off Capivasertib to capecitabine but developed lower abdominal/ back pain. Pain was 10/10 ,4 days ago and went to [facility] ED but left due to 3 hour wait. Pain improved with wedge pillow but persistent over the weekend. Has history of IBS with exacerbation due to the capivasertib but also has bone mets: she had RT to the R pelvis done with Dr Mary Ann Ibrahim.   Pt will need CT of the abd/ pelvis and possible MRI LS spine.

## 2025-01-13 NOTE — ED ADULT TRIAGE NOTE - CHIEF COMPLAINT QUOTE
pt with hx breast ca with mets to bones, c/o of lower back pain for one week, pt denies any trauma, denies dysuria, pt ambulatory

## 2025-01-13 NOTE — ED PROVIDER NOTE - RAPID ASSESSMENT
GUI Batista I performed a rapid assessment, I am not the primary provider, pt will be further evaluated in the Main ER. 50 y/o female pmh stage 4 breast CA with mets to liver and bone c/o R lower back pain x1 weeks. Pt advised to present to the ER by her oncologist. denies fall or injury. denies numbness, tingling, weakness, bowel or bladder incontinence. Pt is very upset about the wait time. pt thought she would be able to obtain a CT scan quickly. I explained that the ER is very busy and that we can obtain an IV and get blood work and a CT scan but it will be several hours before this is completed. Pt does not want to stay in the ER and does not want to wait to see a doctor in the main ER. Pt states that she is going ot leave as her pain has improved while waiting to be seen. Pt starts chemo tomorrow and will call her oncologist to schedule a CT scan as an outpatient. Pt advised to stay in the ER to obtain labs and CT scan but pt wants to leave. Pt given strict return precautions.

## 2025-01-14 ENCOUNTER — EMERGENCY (EMERGENCY)
Facility: HOSPITAL | Age: 50
LOS: 1 days | Discharge: HOME | End: 2025-01-14
Attending: EMERGENCY MEDICINE | Admitting: EMERGENCY MEDICINE
Payer: COMMERCIAL

## 2025-01-14 VITALS
RESPIRATION RATE: 16 BRPM | HEART RATE: 80 BPM | OXYGEN SATURATION: 98 % | SYSTOLIC BLOOD PRESSURE: 107 MMHG | DIASTOLIC BLOOD PRESSURE: 74 MMHG

## 2025-01-14 VITALS
RESPIRATION RATE: 15 BRPM | TEMPERATURE: 98 F | OXYGEN SATURATION: 98 % | DIASTOLIC BLOOD PRESSURE: 73 MMHG | HEART RATE: 76 BPM | SYSTOLIC BLOOD PRESSURE: 109 MMHG | HEIGHT: 65 IN

## 2025-01-14 DIAGNOSIS — Z98.890 OTHER SPECIFIED POSTPROCEDURAL STATES: Chronic | ICD-10-CM

## 2025-01-14 DIAGNOSIS — Z90.49 ACQUIRED ABSENCE OF OTHER SPECIFIED PARTS OF DIGESTIVE TRACT: Chronic | ICD-10-CM

## 2025-01-14 LAB
ALBUMIN SERPL ELPH-MCNC: 4.2 G/DL — SIGNIFICANT CHANGE UP (ref 3.3–5)
ALP SERPL-CCNC: 132 U/L — HIGH (ref 40–120)
ALT FLD-CCNC: 220 U/L — HIGH (ref 4–33)
ANION GAP SERPL CALC-SCNC: 11 MMOL/L — SIGNIFICANT CHANGE UP (ref 7–14)
APTT BLD: 39.6 SEC — HIGH (ref 24.5–35.6)
AST SERPL-CCNC: 272 U/L — HIGH (ref 4–32)
BASOPHILS # BLD AUTO: 0.04 K/UL — SIGNIFICANT CHANGE UP (ref 0–0.2)
BASOPHILS NFR BLD AUTO: 0.7 % — SIGNIFICANT CHANGE UP (ref 0–2)
BILIRUB SERPL-MCNC: 0.3 MG/DL — SIGNIFICANT CHANGE UP (ref 0.2–1.2)
BLD GP AB SCN SERPL QL: NEGATIVE — SIGNIFICANT CHANGE UP
BUN SERPL-MCNC: 10 MG/DL — SIGNIFICANT CHANGE UP (ref 7–23)
CALCIUM SERPL-MCNC: 9.6 MG/DL — SIGNIFICANT CHANGE UP (ref 8.4–10.5)
CHLORIDE SERPL-SCNC: 103 MMOL/L — SIGNIFICANT CHANGE UP (ref 98–107)
CO2 SERPL-SCNC: 26 MMOL/L — SIGNIFICANT CHANGE UP (ref 22–31)
CREAT SERPL-MCNC: 0.67 MG/DL — SIGNIFICANT CHANGE UP (ref 0.5–1.3)
EGFR: 106 ML/MIN/1.73M2 — SIGNIFICANT CHANGE UP
EOSINOPHIL # BLD AUTO: 0.26 K/UL — SIGNIFICANT CHANGE UP (ref 0–0.5)
EOSINOPHIL NFR BLD AUTO: 4.3 % — SIGNIFICANT CHANGE UP (ref 0–6)
GLUCOSE SERPL-MCNC: 81 MG/DL — SIGNIFICANT CHANGE UP (ref 70–99)
HCT VFR BLD CALC: 38.1 % — SIGNIFICANT CHANGE UP (ref 34.5–45)
HGB BLD-MCNC: 12.7 G/DL — SIGNIFICANT CHANGE UP (ref 11.5–15.5)
IANC: 4.3 K/UL — SIGNIFICANT CHANGE UP (ref 1.8–7.4)
IMM GRANULOCYTES NFR BLD AUTO: 1 % — HIGH (ref 0–0.9)
INR BLD: 1.15 RATIO — SIGNIFICANT CHANGE UP (ref 0.85–1.16)
LIDOCAIN IGE QN: 37 U/L — SIGNIFICANT CHANGE UP (ref 7–60)
LYMPHOCYTES # BLD AUTO: 0.81 K/UL — LOW (ref 1–3.3)
LYMPHOCYTES # BLD AUTO: 13.3 % — SIGNIFICANT CHANGE UP (ref 13–44)
MCHC RBC-ENTMCNC: 29.6 PG — SIGNIFICANT CHANGE UP (ref 27–34)
MCHC RBC-ENTMCNC: 33.3 G/DL — SIGNIFICANT CHANGE UP (ref 32–36)
MCV RBC AUTO: 88.8 FL — SIGNIFICANT CHANGE UP (ref 80–100)
MONOCYTES # BLD AUTO: 0.61 K/UL — SIGNIFICANT CHANGE UP (ref 0–0.9)
MONOCYTES NFR BLD AUTO: 10 % — SIGNIFICANT CHANGE UP (ref 2–14)
NEUTROPHILS # BLD AUTO: 4.3 K/UL — SIGNIFICANT CHANGE UP (ref 1.8–7.4)
NEUTROPHILS NFR BLD AUTO: 70.7 % — SIGNIFICANT CHANGE UP (ref 43–77)
NRBC # BLD: 0 /100 WBCS — SIGNIFICANT CHANGE UP (ref 0–0)
NRBC # FLD: 0 K/UL — SIGNIFICANT CHANGE UP (ref 0–0)
PLATELET # BLD AUTO: 139 K/UL — LOW (ref 150–400)
POTASSIUM SERPL-MCNC: 4.1 MMOL/L — SIGNIFICANT CHANGE UP (ref 3.5–5.3)
POTASSIUM SERPL-SCNC: 4.1 MMOL/L — SIGNIFICANT CHANGE UP (ref 3.5–5.3)
PROT SERPL-MCNC: 7.2 G/DL — SIGNIFICANT CHANGE UP (ref 6–8.3)
PROTHROM AB SERPL-ACNC: 13.7 SEC — HIGH (ref 9.9–13.4)
RBC # BLD: 4.29 M/UL — SIGNIFICANT CHANGE UP (ref 3.8–5.2)
RBC # FLD: 12.8 % — SIGNIFICANT CHANGE UP (ref 10.3–14.5)
RH IG SCN BLD-IMP: NEGATIVE — SIGNIFICANT CHANGE UP
SODIUM SERPL-SCNC: 140 MMOL/L — SIGNIFICANT CHANGE UP (ref 135–145)
WBC # BLD: 6.08 K/UL — SIGNIFICANT CHANGE UP (ref 3.8–10.5)
WBC # FLD AUTO: 6.08 K/UL — SIGNIFICANT CHANGE UP (ref 3.8–10.5)

## 2025-01-14 PROCEDURE — 74177 CT ABD & PELVIS W/CONTRAST: CPT | Mod: 26

## 2025-01-14 PROCEDURE — 99284 EMERGENCY DEPT VISIT MOD MDM: CPT

## 2025-01-14 PROCEDURE — 72132 CT LUMBAR SPINE W/DYE: CPT | Mod: 26

## 2025-01-14 PROCEDURE — 99285 EMERGENCY DEPT VISIT HI MDM: CPT

## 2025-01-14 RX ORDER — MORPHINE SULFATE 15 MG
2 TABLET, EXTENDED RELEASE ORAL ONCE
Refills: 0 | Status: DISCONTINUED | OUTPATIENT
Start: 2025-01-14 | End: 2025-01-14

## 2025-01-14 RX ADMIN — Medication 2 MILLIGRAM(S): at 07:15

## 2025-01-14 RX ADMIN — Medication 2 MILLIGRAM(S): at 07:50

## 2025-01-14 NOTE — ED PROVIDER NOTE - OBJECTIVE STATEMENT
50 y/o F with h/o IBS, anxiety/depression, stage IV breast ca with mets to bone and liver with 1 week R lower back, flank, and RLQ pain.  No associated fever, chills, n/v/c/d, dysuria, vaginal bleeding.  Pt sent by heme-onc for CT imaging.

## 2025-01-14 NOTE — ED ADULT NURSE REASSESSMENT NOTE - NS ED NURSE REASSESS COMMENT FT1
Patient crying and in pain; MD Leong made aware. Patient medicated per chart. Respirations even and unlabored, chest rise symmetrical b/l. Family at bedside. Emotional support provided. Comfort measures maintained. Bed in lowest position. Safety maintained.

## 2025-01-14 NOTE — ED PROVIDER NOTE - NSICDXPASTSURGICALHX_GEN_ALL_CORE_FT
PAST SURGICAL HISTORY:  History of cholecystectomy     History of myomectomy     Status post excision of fibroadenoma of breast

## 2025-01-14 NOTE — ED ADULT NURSE REASSESSMENT NOTE - NS ED NURSE REASSESS COMMENT FT1
Patient received from intake. Patient is alert and in no signs of acute distress. Patient pending imaging. Respirations even and unlabored, chest rise symmetrical b/l. Comfort measures maintained. Family at bedside. Bed in lowest position. Safety maintained.

## 2025-01-14 NOTE — ED PROVIDER NOTE - PATIENT PORTAL LINK FT
You can access the FollowMyHealth Patient Portal offered by [facility] by registering at the following website: [email or web address]. By joining [email or web address]’s FollowMyHealth portal, you will also be able to view your health information using other applications (apps) compatible with our system. You can access the FollowMyHealth Patient Portal offered by [facility] by registering at the following website: [email or web address]. By joining [email or web address]’s FollowMyHealth portal, you will also be able to view your health information using other applications (apps) compatible with our system.

## 2025-01-14 NOTE — ED PROVIDER NOTE - CLINICAL SUMMARY MEDICAL DECISION MAKING FREE TEXT BOX
48 y/o F with h/o stage IV breast ca with R low back, flank, and lower quad pain; known mets.  No red flag s/s to suggest cord involvement.  Afebrile, HD stable.  Will obtain labs, ct a/p and LS spine, pt declining pain meds at this time, reassess.

## 2025-01-14 NOTE — ED PROVIDER NOTE - NSFOLLOWUPINSTRUCTIONS_ED_ALL_ED_FT
You were seen in the emergency department for back and abdominal pain.  You had blood work and a CT scan of your abdomen/pelvis and your lumbosacral spine.  You left prior to receiving results of the CT scan.  Please follow-up with your oncologist today for further management.    Drink plenty of fluids.  You can take ibuprofen 600mg every 6 hours or Tylenol 650mg every 4 hours as needed for pain or fever.  Return to the emergency department for any new or worsening symptoms. You were seen in the emergency department for back and abdominal pain.  You had blood work and a CT scan of your abdomen/pelvis and your lumbosacral spine.  Your CT showed constipation as well as the known metastases.  You can take MIRALAX ONCE A DAY for constipation.  Follow-up with your oncologist today.    Drink plenty of fluids.  You can take ibuprofen 600mg every 6 hours or Tylenol 650mg every 4 hours as needed for pain or fever.  Return to the emergency department for any new or worsening symptoms.

## 2025-01-14 NOTE — ED ADULT TRIAGE NOTE - CHIEF COMPLAINT QUOTE
C/O Lower back pain. No complaints of chest pain, headache, nausea, dizziness, vomiting  SOB, fever, chills verbalized. Phx history of breast CA,

## 2025-01-14 NOTE — ED PROVIDER NOTE - PROGRESS NOTE DETAILS
Salo WINKLER: Pt does not want to wait for CT results.  Will f/u with outpatient heme-onc today to follow-up results and discuss further management. Salo WINKLER: Pt updated on labs and CT results.  No acutely emergent findings.  Stable for dc home, will f/u with heme-onc today.

## 2025-01-15 ENCOUNTER — NON-APPOINTMENT (OUTPATIENT)
Age: 50
End: 2025-01-15

## 2025-01-15 RX ORDER — TRAMADOL HYDROCHLORIDE 50 MG/1
50 TABLET, COATED ORAL
Qty: 21 | Refills: 0 | Status: ACTIVE | COMMUNITY
Start: 2025-01-15 | End: 1900-01-01

## 2025-01-15 RX ORDER — DEXAMETHASONE 2 MG/1
2 TABLET ORAL DAILY
Qty: 20 | Refills: 0 | Status: ACTIVE | COMMUNITY
Start: 2025-01-15 | End: 1900-01-01

## 2025-01-16 ENCOUNTER — NON-APPOINTMENT (OUTPATIENT)
Age: 50
End: 2025-01-16

## 2025-01-17 ENCOUNTER — APPOINTMENT (OUTPATIENT)
Dept: INFUSION THERAPY | Facility: HOSPITAL | Age: 50
End: 2025-01-17

## 2025-01-17 ENCOUNTER — APPOINTMENT (OUTPATIENT)
Dept: MRI IMAGING | Facility: CLINIC | Age: 50
End: 2025-01-17
Payer: SELF-PAY

## 2025-01-17 ENCOUNTER — APPOINTMENT (OUTPATIENT)
Dept: GERIATRICS | Facility: CLINIC | Age: 50
End: 2025-01-17
Payer: COMMERCIAL

## 2025-01-17 ENCOUNTER — APPOINTMENT (OUTPATIENT)
Dept: HEMATOLOGY ONCOLOGY | Facility: CLINIC | Age: 50
End: 2025-01-17
Payer: COMMERCIAL

## 2025-01-17 DIAGNOSIS — G89.3 NEOPLASM RELATED PAIN (ACUTE) (CHRONIC): ICD-10-CM

## 2025-01-17 DIAGNOSIS — F41.9 ANXIETY DISORDER, UNSPECIFIED: ICD-10-CM

## 2025-01-17 DIAGNOSIS — K59.00 CONSTIPATION, UNSPECIFIED: ICD-10-CM

## 2025-01-17 DIAGNOSIS — M54.9 DORSALGIA, UNSPECIFIED: ICD-10-CM

## 2025-01-17 DIAGNOSIS — Z51.5 ENCOUNTER FOR PALLIATIVE CARE: ICD-10-CM

## 2025-01-17 DIAGNOSIS — C79.51 SECONDARY MALIGNANT NEOPLASM OF BONE: ICD-10-CM

## 2025-01-17 DIAGNOSIS — Z17.0 MALIGNANT NEOPLASM OF UNSPECIFIED SITE OF RIGHT FEMALE BREAST: ICD-10-CM

## 2025-01-17 DIAGNOSIS — C50.911 MALIGNANT NEOPLASM OF UNSPECIFIED SITE OF RIGHT FEMALE BREAST: ICD-10-CM

## 2025-01-17 PROCEDURE — 99214 OFFICE O/P EST MOD 30 MIN: CPT

## 2025-01-17 PROCEDURE — A9585: CPT | Mod: JW

## 2025-01-17 PROCEDURE — 99205 OFFICE O/P NEW HI 60 MIN: CPT

## 2025-01-17 PROCEDURE — 72157 MRI CHEST SPINE W/O & W/DYE: CPT

## 2025-01-17 RX ORDER — OXYCODONE 5 MG/1
5 TABLET ORAL
Qty: 90 | Refills: 0 | Status: ACTIVE | COMMUNITY
Start: 2025-01-15 | End: 1900-01-01

## 2025-01-17 RX ORDER — TIZANIDINE 2 MG/1
2 TABLET ORAL
Qty: 90 | Refills: 0 | Status: ACTIVE | COMMUNITY
Start: 2025-01-17 | End: 1900-01-01

## 2025-01-20 NOTE — H&P ADULT - PROBLEM SELECTOR PLAN 1
intractable.  Anticipate fentanyl patch for basal control  for now start dilaudid 0.5mg iv q6 for severe pain with 0.2mg iv q3 for breakthrough  agreeable to trialing tizanidine at night as it did help her sleep though caused daytime dizziness  xanax as prescribed at home 1mg qam, 3mg qhs  palliative consult placed  emailed rad onc for consult

## 2025-01-20 NOTE — ED ADULT NURSE NOTE - NSICDXPASTMEDICALHX_GEN_ALL_CORE_FT
Patient walked into [place] clinic, had questions. Patient wants to know if he can submerge his knee under water yet. Also wants to know if physical therapist cna use dry needling. Please advise.   PAST MEDICAL HISTORY:  IBS (irritable bowel syndrome)

## 2025-01-20 NOTE — PATIENT PROFILE ADULT - FALL HARM RISK - HARM RISK INTERVENTIONS

## 2025-01-20 NOTE — H&P ADULT - NSHPLABSRESULTS_GEN_ALL_CORE
reviewed by me:    < from: CT Lumbar Spine Reform w/ IV Cont (01.14.25 @ 06:23) >    IMPRESSION:  Innumerable hypodense ill-defined hepatic lesions concerning for   metastases.    Multiple prominent retroperitoneal lymph nodes, indeterminate.    Similar osseous metastatic disease in the axial and appendicular skeleton.    Stool-filled colon suggesting moderate constipation.    < end of copied text >

## 2025-01-20 NOTE — ED PROVIDER NOTE - NSICDXFAMILYHX_GEN_ALL_CORE_FT
FAMILY HISTORY:  Mother  Still living? Yes, Estimated age: Age Unknown  FH: stomach cancer, Age at diagnosis: Age Unknown

## 2025-01-20 NOTE — ED PROVIDER NOTE - PATIENT PORTAL LINK FT
You can access the FollowMyHealth Patient Portal offered by [facility] by registering at the following website: [email or web address]. By joining [email or web address]’s FollowMyHealth portal, you will also be able to view your health information using other applications (apps) compatible with our system.

## 2025-01-20 NOTE — H&P ADULT - COMMENTS
Gen: unintentional weight loss  Endo: no hot flashes past week while not eating sugar  Neuro: occasional HA, no syncope, +lightheadedness  Psych:+anxiety/depression related to diagnosis  CV: no cp/palpitations  Resp: sob when crying  Abd: +constipation, +nausea, no vomiting  : no dysuria  Heme: no epistaxis/brbpr  MSK: +body pains/bone pains

## 2025-01-20 NOTE — ED PROVIDER NOTE - CLINICAL SUMMARY MEDICAL DECISION MAKING FREE TEXT BOX
49-year-old female with history of metastatic breast cancer which has progressed to stage IV, IBS comes into the ED for evaluation of 10 out of 10 right lower back pain which radiates towards the abdomen.  Patient has been taking oxycodone for pain which has improved it slightly from 10 to an 8.  She denies any red flag symptoms such as sensory or motor weakness, recent fevers, chills, bowel or bladder incontinence, saddle anesthesia.  She does not have any chest pain, shortness of breath.  On exam patient is moving all 4 extremities, normal gait, no specific findings other than tenderness to the right lower back. Will order basic labs, treat pain, and likely admit for pain control.

## 2025-01-20 NOTE — CONSULT NOTE ADULT - ASSESSMENT
Navah Lerman is a 49 year old woman with metastatic breast cancer (...)  Navah Lerman is a 48 yo female with hx of ER+WI-HER2- right invasive ductal carcinoma s/p mastectomy with micrometastatic disease, f/b ddACT ==> RT to right chest wall and regional nodes to 50, f/b anastrazole. continued until 1/2024, when she developed right hip pain, confirming metastatic disease. She underwent palliative RT 20 Gy/ 5 fx to right hip 2/2024 without relief as noted by Dr. Ibrahim in a follow up note in April. She was on Faslodex and Truqap until 12/29/24 PET/CT showed innumerable osseous metastases prompting switch to Capecitabine; not started d/t concerns of side effects and ongoing back pain.    Radiation medicine consulted for possible palliative RT for relief of intractable back pain and abdominal pain. On exam today there is no specific focality to her back pain. She describes some mid-low back pain and although there is tenderness at this level, she describes that as long standing and not discernibly worse in the context of her recent presentation. Her thoracic spine MRI does not show any areas of concern that could readily explain her current presentation. Her RUQ abdominal pain could possibly be explained by her numerous liver metastases, and this could potentially be associated with her back pain as well, however this is very uncertain. She is planned for a lumbar spine MRI w/ sedation. There is no tenderness to palpation of the lumbar spine, but this could be useful to rule out presence of any spinal metastatic disease contributing to pain.     At this time, there is no apparent focal area to target where she would stand to benefit from a course of palliative radiation. Due to the diffuse nature of her disease and non-localized nature of her symptoms, a course of systemic therapy may benefit her and as such recommend discussion of that possibility with medical oncology. Case discussed with on-call attending.  Navah Lerman is a 48 yo female with hx of ER+IA-HER2- right invasive ductal carcinoma s/p mastectomy with micrometastatic disease, f/b ddACT ==> RT to right chest wall and regional nodes to 50, f/b anastrazole. continued until 1/2024, when she developed right hip pain, confirming metastatic disease. She underwent palliative RT 20 Gy/ 5 fx to right hip 2/2024 without relief as noted by Dr. Ibrahim in a follow up note in April. She was on Faslodex and Truqap until 12/29/24 PET/CT showed innumerable osseous metastases prompting switch to Capecitabine; not started d/t concerns of side effects and ongoing back pain.    Radiation medicine consulted for possible palliative RT for relief of intractable back pain and abdominal pain. On exam today there is no specific focality to her back pain. She describes some mid-low back pain and although there is tenderness at this level, she describes that as long standing and not discernibly worse in the context of her recent presentation. Her thoracic spine MRI does not show any areas of concern that could readily explain her current presentation. Her RUQ abdominal pain could possibly be explained by her numerous liver metastases, and this could potentially be associated with her back pain as well, however this is very uncertain. She is planned for a lumbar spine MRI w/ sedation. There is no tenderness to palpation of the lumbar spine, but this could be useful to rule out presence of any spinal metastatic disease contributing to pain. We will follow the results of this MRI.      At this time, there is no apparent focal area to target where she would stand to benefit from a course of palliative radiation. Due to the diffuse nature of her disease and non-localized nature of her symptoms, a course of systemic therapy may benefit her and as such recommend discussion of that possibility with medical oncology.  Case discussed with on-call attending.

## 2025-01-20 NOTE — ED PROVIDER NOTE - PHYSICAL EXAMINATION
GENERAL: Not in acute distress, non-toxic appearing  HEAD: normocephalic, atraumatic  HEENT: EOMI, normal conjunctiva, oral mucosa moist, neck supple  CARDIAC: appears well perfused, RRR  PULM: normal work of breathing, lungs clear bilat  GI: abdomen nondistended, soft, not peritoneal   NEURO: alert and oriented x 3, normal speech, no focal motor or sensory deficits, gait normal, no gross neurologic deficit  MSK: + tenderness to left lower back, No visible deformities, no peripheral edema,   SKIN: No visible rashes, dry, well-perfused  PSYCH: + tearful

## 2025-01-20 NOTE — H&P ADULT - HISTORY OF PRESENT ILLNESS
50yo F BRCA pmh Stage II right invasive ductal carcinoma s/p ddACT followed by RT and had been on anastrozole since 2021, extensive osseous and liver mets, seen by palliative care 1/17 prescribed trial of oxycodone, tramadol, and tizanidine without relief.  Pain has been increases daily and unable to get out of bed the past 4 days.  Also unable to eat as she cannot sit upright due to pain.  Has been constipated but had a bowel movement yesterday.  Barely makes it to the bathroom due to pain and uses gloves as she cannot stand long enough due to the pain to wash her hands.  Reports right lower back is the worst and right abdomen feels like a bullet wound.  At best, oxycodone brought pain from 9 to an 8.    In ED: 1L LR, 0.5mg dilaudid @5am, 2mg morphine x 2, zofran

## 2025-01-20 NOTE — CONSULT NOTE ADULT - SUBJECTIVE AND OBJECTIVE BOX
***Draft***  48 yo female with hx of ER+OK-HER2- right invasive ductal carcinoma s/p mastectomy with micrometastatic disease, f/b ddACT ==> RT to right chest wall and regional nodes to 50, f/b anastrazole. continued until 1/2024, when she developed right hip pain, confirming metastatic disease.     She underwent palliative RT 20 Gy/ 5 fx to right hip 2/2024 without relief as noted by Dr. Ibrahim in a follow up note in April. She was on Faslodex and Truqap until 12/29/24 PET/CT showed innumerable osseous metastases prompting switch to Capecitabine.     She did not start capectabine prescribed for this in view of concern for side effects and her back pain.     Recently has had multiple presentations for this back pain, including admission to [facility] for a few days, presentation to [facility] ED 1/14/25, with CT scan then showing diffuse osseous mets and innumerable hypodense ill-defined hepatic lesions. Seen by palliative care 1/17 prescribed trial of oxycodone, tramadol, and tizanidine without relief.   and now this recent presentation when she was advised to go to [facility] ED last night but went to [facility] ED.     Also unable to eat as she cannot sit upright due to pain.  Has been constipated but had a bowel movement yesterday.  Barely makes it to the bathroom due to pain and uses gloves as she cannot stand long enough due to the pain to wash her hands.  Reports right lower back is the worst and right abdomen feels like a bullet wound.  At best, oxycodone brought pain from 9 to an 8.    Radiation medicine HPI: Patient seen and examined at bedside      Allergies    Keflex (Other)    Intolerances        ROS: [  ] Fever  [  ] Chills  [  ]Chest Pain [  ] SOB  [  ]Cough [  ] N/V  [  ] Diarrhea [  ]Constipation [  ]Other ROS:  [  ] ROS otherwise negative    PAST MEDICAL & SURGICAL HISTORY:  IBS (irritable bowel syndrome)    History of cholecystectomy    History of myomectomy    Status post excision of fibroadenoma of breast        FAMILY HISTORY:  FH: stomach cancer (Mother)        MEDICATIONS  (STANDING):  cholecalciferol 2000 Unit(s) Oral daily  enoxaparin Injectable 40 milliGRAM(s) SubCutaneous every 24 hours  lactated ringers. 1000 milliLiter(s) (100 mL/Hr) IV Continuous <Continuous>  polyethylene glycol 3350 17 Gram(s) Oral two times a day  senna 2 Tablet(s) Oral at bedtime  sertraline 100 milliGRAM(s) Oral daily    MEDICATIONS  (PRN):  ALPRAZolam 1 milliGRAM(s) Oral daily PRN anxiety  ALPRAZolam 3 milliGRAM(s) Oral at bedtime PRN anxiety  aluminum hydroxide/magnesium hydroxide/simethicone Suspension 30 milliLiter(s) Oral every 4 hours PRN Dyspepsia  HYDROmorphone  Injectable 0.5 milliGRAM(s) IV Push every 6 hours PRN Severe Pain (7 - 10)  HYDROmorphone  Injectable 0.2 milliGRAM(s) IV Push every 3 hours PRN Moderate Pain (4 - 6)  HYDROmorphone  Injectable 0.5 milliGRAM(s) IV Push Once PRN Severe Pain (7 - 10)  magnesium hydroxide Suspension 30 milliLiter(s) Oral daily PRN Constipation  ondansetron Injectable 4 milliGRAM(s) IV Push every 8 hours PRN Nausea and/or Vomiting  tiZANidine 2 milliGRAM(s) Oral at bedtime PRN Muscle Spasm      PHYSICAL EXAM  Vital Signs Last 24 Hrs  T(C): 36.9 (20 Jan 2025 12:23), Max: 37.5 (20 Jan 2025 07:05)  T(F): 98.4 (20 Jan 2025 12:23), Max: 99.5 (20 Jan 2025 07:05)  HR: 71 (20 Jan 2025 12:23) (71 - 75)  BP: 110/69 (20 Jan 2025 12:23) (104/55 - 115/65)  BP(mean): 76 (20 Jan 2025 10:49) (76 - 76)  RR: 18 (20 Jan 2025 12:23) (16 - 18)  SpO2: 95% (20 Jan 2025 12:23) (95% - 99%)    Parameters below as of 20 Jan 2025 12:23  Patient On (Oxygen Delivery Method): room air        General: Well nourished, well developed, no acute distress  HEENT: NC/AT; EOMI, PERRL, sclera nonicteric; external ears normal; no rhinorrhea or epistaxis; mucous membranes moist; oropharynx clear and without erythema  CV: NR, RR; no appreciable r/m/g  Lungs: CTAB, no increased work of breathing  Abdomen: Bowel sounds present; soft, NTND  MSK: Vertebral spine non-tender to palpation  Neuro: AAOx3; cranial nerves II-XII intact; strength 5/5 in upper and lower extremities; sensation to light touch in tact bilaterally.  Psych: Full affect; mood congruent  Skin: no visible rashes on limited examination    IMAGING/LABS/PATHOLOGY: I have personally reviewed the relevant labs, pathology, and imaging as noted in the HPI.  In addition,                          12.9   8.20  )-----------( 112      ( 20 Jan 2025 03:04 )             39.4     01-20    139  |  100  |  8   ----------------------------<  100[H]  4.0   |  26  |  0.57    Ca    9.1      20 Jan 2025 03:04  Mg     1.9     01-20    TPro  6.8  /  Alb  3.8  /  TBili  1.3[H]  /  DBili  x   /  AST  334[H]  /  ALT  182[H]  /  AlkPhos  230[H]  01-20           ***Draft***  48 yo female with hx of ER+CT-HER2- right invasive ductal carcinoma s/p mastectomy with micrometastatic disease, f/b ddACT ==> RT to right chest wall and regional nodes to 50, f/b anastrazole. continued until 1/2024, when she developed right hip pain, confirming metastatic disease.     She underwent palliative RT 20 Gy/ 5 fx to right hip 2/2024 without relief as noted by Dr. Ibrahim in a follow up note in April. She was on Faslodex and Truqap until 12/29/24 PET/CT showed innumerable osseous metastases prompting switch to Capecitabine; not started d/t concerns of side effects and ongoing back pain.     Recently has had multiple presentations for this back pain, including admission to [facility] for a few days, presentation to [facility] ED 1/14/25, with CT scan then showing diffuse osseous mets and innumerable hypodense ill-defined hepatic lesions. Seen by palliative care 1/17 prescribed trial of oxycodone, tramadol, and tizanidine without relief. Most recently presented to [facility] ED with this same symptomatology.     At presentation was unable to eat, as pain was preventing her from sitting upright due to pain.      Radiation medicine HPI: Patient seen and examined at bedside. Reports that she has back pain of variable focality. Although she presently does not have right upper quadrant pain, she states she had pain within that portion of her abdomen. She has pain in her back currently wrapping around the level of T12. She states that there is tenderness at that level that is long standing and not tied to this presentation/ not acutely worsened. She notes that at times, the pain radiates further down her spine but not at present. She denies any focal weakness, numbness or tingling.   Allergies    Keflex (Other)    Intolerances        ROS: [x  ] Fever  [x  ] Chills  [x  ]Chest Pain [x  ] SOB  [  ]Cough [x  ] N/V  [x  ] Diarrhea [ x ]Constipation [  ]Other ROS:  [  ] ROS otherwise negative    PAST MEDICAL & SURGICAL HISTORY:  IBS (irritable bowel syndrome)    History of cholecystectomy    History of myomectomy    Status post excision of fibroadenoma of breast        FAMILY HISTORY:  FH: stomach cancer (Mother)        MEDICATIONS  (STANDING):  cholecalciferol 2000 Unit(s) Oral daily  enoxaparin Injectable 40 milliGRAM(s) SubCutaneous every 24 hours  lactated ringers. 1000 milliLiter(s) (100 mL/Hr) IV Continuous <Continuous>  polyethylene glycol 3350 17 Gram(s) Oral two times a day  senna 2 Tablet(s) Oral at bedtime  sertraline 100 milliGRAM(s) Oral daily    MEDICATIONS  (PRN):  ALPRAZolam 1 milliGRAM(s) Oral daily PRN anxiety  ALPRAZolam 3 milliGRAM(s) Oral at bedtime PRN anxiety  aluminum hydroxide/magnesium hydroxide/simethicone Suspension 30 milliLiter(s) Oral every 4 hours PRN Dyspepsia  HYDROmorphone  Injectable 0.5 milliGRAM(s) IV Push every 6 hours PRN Severe Pain (7 - 10)  HYDROmorphone  Injectable 0.2 milliGRAM(s) IV Push every 3 hours PRN Moderate Pain (4 - 6)  HYDROmorphone  Injectable 0.5 milliGRAM(s) IV Push Once PRN Severe Pain (7 - 10)  magnesium hydroxide Suspension 30 milliLiter(s) Oral daily PRN Constipation  ondansetron Injectable 4 milliGRAM(s) IV Push every 8 hours PRN Nausea and/or Vomiting  tiZANidine 2 milliGRAM(s) Oral at bedtime PRN Muscle Spasm      PHYSICAL EXAM  Vital Signs Last 24 Hrs  T(C): 36.9 (20 Jan 2025 12:23), Max: 37.5 (20 Jan 2025 07:05)  T(F): 98.4 (20 Jan 2025 12:23), Max: 99.5 (20 Jan 2025 07:05)  HR: 71 (20 Jan 2025 12:23) (71 - 75)  BP: 110/69 (20 Jan 2025 12:23) (104/55 - 115/65)  BP(mean): 76 (20 Jan 2025 10:49) (76 - 76)  RR: 18 (20 Jan 2025 12:23) (16 - 18)  SpO2: 95% (20 Jan 2025 12:23) (95% - 99%)    Parameters below as of 20 Jan 2025 12:23  Patient On (Oxygen Delivery Method): room air        General: Well nourished, well developed, no acute distress  HEENT: NC/AT; EOMI, PERRL, sclera nonicteric; external ears normal; no rhinorrhea or epistaxis; mucous membranes moist; oropharynx clear and without erythema  CV: NR, RR; no appreciable r/m/g  Lungs: CTAB, no increased work of breathing  Abdomen: Bowel sounds present; soft, NTND  MSK: Tenderness to palpation of vertebral spine   Neuro: AAOx3; cranial nerves II-XII intact; strength 5/5 in upper and lower extremities; sensation to light touch in tact bilaterally.  Psych: Full affect; mood congruent  Skin: no visible rashes on limited examination    IMAGING/LABS/PATHOLOGY: I have personally reviewed the relevant labs, pathology, and imaging as noted in the HPI.  In addition,                          12.9   8.20  )-----------( 112      ( 20 Jan 2025 03:04 )             39.4     01-20    139  |  100  |  8   ----------------------------<  100[H]  4.0   |  26  |  0.57    Ca    9.1      20 Jan 2025 03:04  Mg     1.9     01-20    TPro  6.8  /  Alb  3.8  /  TBili  1.3[H]  /  DBili  x   /  AST  334[H]  /  ALT  182[H]  /  AlkPhos  230[H]  01-20           48 yo female with hx of ER+ID-HER2- right invasive ductal carcinoma s/p mastectomy with micrometastatic disease, f/b ddACT ==> RT to right chest wall and regional nodes to 50, f/b anastrazole. continued until 1/2024, when she developed right hip pain, confirming metastatic disease.     She underwent palliative RT 20 Gy/ 5 fx to right hip 2/2024 without relief as noted by Dr. Ibrahim in a follow up note in April. She was on Faslodex and Truqap until 12/29/24 PET/CT showed innumerable osseous metastases prompting switch to Capecitabine; not started d/t concerns of side effects and ongoing back pain.     Recently has had multiple presentations for this back pain, including admission to [facility] for a few days, presentation to [facility] ED 1/14/25, with CT scan then showing diffuse osseous mets and innumerable hypodense ill-defined hepatic lesions. Seen by palliative care 1/17 prescribed trial of oxycodone, tramadol, and tizanidine without relief. Most recently presented to [facility] ED with this same symptomatology.     At presentation was unable to eat, as pain was preventing her from sitting upright due to pain.      Radiation medicine HPI: Patient seen and examined at bedside. Reports that she has back pain of variable focality. Although she presently does not have right upper quadrant pain, she states she had pain within that portion of her abdomen. She has pain in her back currently wrapping around the level of T12. She states that there is tenderness at that level that is long standing and not tied to this presentation/ not acutely worsened. She notes that at times, the pain radiates further down her spine but not at present. She denies any focal weakness, numbness or tingling.   Allergies    Keflex (Other)    Intolerances        ROS: [x  ] Fever  [x  ] Chills  [x  ]Chest Pain [x  ] SOB  [  ]Cough [x  ] N/V  [x  ] Diarrhea [ x ]Constipation [  ]Other ROS:  [  ] ROS otherwise negative    PAST MEDICAL & SURGICAL HISTORY:  IBS (irritable bowel syndrome)    History of cholecystectomy    History of myomectomy    Status post excision of fibroadenoma of breast        FAMILY HISTORY:  FH: stomach cancer (Mother)        MEDICATIONS  (STANDING):  cholecalciferol 2000 Unit(s) Oral daily  enoxaparin Injectable 40 milliGRAM(s) SubCutaneous every 24 hours  lactated ringers. 1000 milliLiter(s) (100 mL/Hr) IV Continuous <Continuous>  polyethylene glycol 3350 17 Gram(s) Oral two times a day  senna 2 Tablet(s) Oral at bedtime  sertraline 100 milliGRAM(s) Oral daily    MEDICATIONS  (PRN):  ALPRAZolam 1 milliGRAM(s) Oral daily PRN anxiety  ALPRAZolam 3 milliGRAM(s) Oral at bedtime PRN anxiety  aluminum hydroxide/magnesium hydroxide/simethicone Suspension 30 milliLiter(s) Oral every 4 hours PRN Dyspepsia  HYDROmorphone  Injectable 0.5 milliGRAM(s) IV Push every 6 hours PRN Severe Pain (7 - 10)  HYDROmorphone  Injectable 0.2 milliGRAM(s) IV Push every 3 hours PRN Moderate Pain (4 - 6)  HYDROmorphone  Injectable 0.5 milliGRAM(s) IV Push Once PRN Severe Pain (7 - 10)  magnesium hydroxide Suspension 30 milliLiter(s) Oral daily PRN Constipation  ondansetron Injectable 4 milliGRAM(s) IV Push every 8 hours PRN Nausea and/or Vomiting  tiZANidine 2 milliGRAM(s) Oral at bedtime PRN Muscle Spasm      PHYSICAL EXAM  Vital Signs Last 24 Hrs  T(C): 36.9 (20 Jan 2025 12:23), Max: 37.5 (20 Jan 2025 07:05)  T(F): 98.4 (20 Jan 2025 12:23), Max: 99.5 (20 Jan 2025 07:05)  HR: 71 (20 Jan 2025 12:23) (71 - 75)  BP: 110/69 (20 Jan 2025 12:23) (104/55 - 115/65)  BP(mean): 76 (20 Jan 2025 10:49) (76 - 76)  RR: 18 (20 Jan 2025 12:23) (16 - 18)  SpO2: 95% (20 Jan 2025 12:23) (95% - 99%)    Parameters below as of 20 Jan 2025 12:23  Patient On (Oxygen Delivery Method): room air        General: Well nourished, well developed, no acute distress  HEENT: NC/AT; EOMI, PERRL, sclera nonicteric; external ears normal; no rhinorrhea or epistaxis; mucous membranes moist; oropharynx clear and without erythema  CV: NR, RR; no appreciable r/m/g  Lungs: CTAB, no increased work of breathing  Abdomen: Bowel sounds present; soft, NTND  MSK: Tenderness to palpation of vertebral spine   Neuro: AAOx3; cranial nerves II-XII intact; strength 5/5 in upper and lower extremities; sensation to light touch in tact bilaterally.  Psych: Full affect; mood congruent  Skin: no visible rashes on limited examination    IMAGING/LABS/PATHOLOGY: I have personally reviewed the relevant labs, pathology, and imaging as noted in the HPI.  In addition,                          12.9   8.20  )-----------( 112      ( 20 Jan 2025 03:04 )             39.4     01-20    139  |  100  |  8   ----------------------------<  100[H]  4.0   |  26  |  0.57    Ca    9.1      20 Jan 2025 03:04  Mg     1.9     01-20    TPro  6.8  /  Alb  3.8  /  TBili  1.3[H]  /  DBili  x   /  AST  334[H]  /  ALT  182[H]  /  AlkPhos  230[H]  01-20           50 yo female with hx of ER+MI-HER2- right invasive ductal carcinoma s/p mastectomy with micrometastatic disease, f/b ddACT ==> RT to right chest wall and regional nodes to 50, f/b anastrazole. continued until 1/2024, when she developed right hip pain, confirming metastatic disease.     She underwent palliative RT 20 Gy/ 5 fx to right hip 2/2024 without relief as noted by Dr. Ibrahim in a follow up note in April. She was on Faslodex and Truqap until 12/29/24 PET/CT showed innumerable osseous metastases prompting switch to Capecitabine; not started d/t concerns of side effects and ongoing back pain.     Recently has had multiple presentations for this back pain, including admission to [facility] for a few days, presentation to [facility] ED 1/14/25, with CT scan then showing diffuse osseous mets and innumerable hypodense ill-defined hepatic lesions. Seen by palliative care 1/17 prescribed trial of oxycodone, tramadol, and tizanidine without relief. Most recently presented to [facility] ED with this same symptomatology.     At presentation was unable to eat, as pain was preventing her from sitting upright due to pain.      Radiation medicine HPI: Patient seen and examined at bedside. Reports that she has back pain of variable focality. Although she presently does not have right upper quadrant pain, she states she had pain within that portion of her abdomen. She has pain in her back currently wrapping around the level of T12. She states that there is tenderness at that level that is long standing and not tied to this presentation/ not acutely worsened. She notes that at times, the pain radiates further down her spine but not at present. She denies any focal weakness, numbness or tingling.   Allergies    Keflex (Other)     Intolerances        ROS: [x  ] Fever  [x  ] Chills  [x  ]Chest Pain [x  ] SOB  [  ]Cough [x  ] N/V  [x  ] Diarrhea [ x ]Constipation [  ]Other ROS:  [  ] ROS otherwise negative    PAST MEDICAL & SURGICAL HISTORY:  IBS (irritable bowel syndrome)    History of cholecystectomy    History of myomectomy    Status post excision of fibroadenoma of breast        FAMILY HISTORY:  FH: stomach cancer (Mother)        MEDICATIONS  (STANDING):  cholecalciferol 2000 Unit(s) Oral daily  enoxaparin Injectable 40 milliGRAM(s) SubCutaneous every 24 hours  lactated ringers. 1000 milliLiter(s) (100 mL/Hr) IV Continuous <Continuous>  polyethylene glycol 3350 17 Gram(s) Oral two times a day  senna 2 Tablet(s) Oral at bedtime  sertraline 100 milliGRAM(s) Oral daily    MEDICATIONS  (PRN):  ALPRAZolam 1 milliGRAM(s) Oral daily PRN anxiety  ALPRAZolam 3 milliGRAM(s) Oral at bedtime PRN anxiety  aluminum hydroxide/magnesium hydroxide/simethicone Suspension 30 milliLiter(s) Oral every 4 hours PRN Dyspepsia  HYDROmorphone  Injectable 0.5 milliGRAM(s) IV Push every 6 hours PRN Severe Pain (7 - 10)  HYDROmorphone  Injectable 0.2 milliGRAM(s) IV Push every 3 hours PRN Moderate Pain (4 - 6)  HYDROmorphone  Injectable 0.5 milliGRAM(s) IV Push Once PRN Severe Pain (7 - 10)  magnesium hydroxide Suspension 30 milliLiter(s) Oral daily PRN Constipation  ondansetron Injectable 4 milliGRAM(s) IV Push every 8 hours PRN Nausea and/or Vomiting  tiZANidine 2 milliGRAM(s) Oral at bedtime PRN Muscle Spasm      PHYSICAL EXAM  Vital Signs Last 24 Hrs  T(C): 36.9 (20 Jan 2025 12:23), Max: 37.5 (20 Jan 2025 07:05)  T(F): 98.4 (20 Jan 2025 12:23), Max: 99.5 (20 Jan 2025 07:05)  HR: 71 (20 Jan 2025 12:23) (71 - 75)  BP: 110/69 (20 Jan 2025 12:23) (104/55 - 115/65)  BP(mean): 76 (20 Jan 2025 10:49) (76 - 76)  RR: 18 (20 Jan 2025 12:23) (16 - 18)  SpO2: 95% (20 Jan 2025 12:23) (95% - 99%)    Parameters below as of 20 Jan 2025 12:23  Patient On (Oxygen Delivery Method): room air        General: Well nourished, well developed, no acute distress  HEENT: NC/AT; EOMI, PERRL, sclera nonicteric; external ears normal; no rhinorrhea or epistaxis; mucous membranes moist; oropharynx clear and without erythema  CV: NR, RR; no appreciable r/m/g  Lungs: CTAB, no increased work of breathing  Abdomen: Bowel sounds present; soft, NTND  MSK: Tenderness to palpation of vertebral spine   Neuro: AAOx3; cranial nerves II-XII intact; strength 5/5 in upper and lower extremities; sensation to light touch in tact bilaterally.  Psych: Full affect; mood congruent  Skin: no visible rashes on limited examination    IMAGING/LABS/PATHOLOGY: I have personally reviewed the relevant labs, pathology, and imaging as noted in the HPI.  In addition,                          12.9   8.20  )-----------( 112      ( 20 Jan 2025 03:04 )             39.4     01-20    139  |  100  |  8   ----------------------------<  100[H]  4.0   |  26  |  0.57    Ca    9.1      20 Jan 2025 03:04  Mg     1.9     01-20    TPro  6.8  /  Alb  3.8  /  TBili  1.3[H]  /  DBili  x   /  AST  334[H]  /  ALT  182[H]  /  AlkPhos  230[H]  01-20

## 2025-01-20 NOTE — H&P ADULT - PROBLEM SELECTOR PLAN 4
dehydrated from poor po   LR x 24 hours  lovenox for elevated improve score with active malignancy  anticipate transfer to [facility] if plans for RT

## 2025-01-20 NOTE — H&P ADULT - NSHPADDITIONALINFOADULT_GEN_ALL_CORE
discussed with patient and sister at bedside discussed with patient and sister at bedside    istop reviewed :   PDI	Current Rx	Drug Type	Rx Written	Rx Dispensed	Drug	Quantity	Days Supply	Prescriber Name	Prescriber PO #	Payment Method	Dispenser  A	Y	O	01/17/2025	01/17/2025	oxycodone hcl (ir) 5 mg tablet	30	5	Michaelle Morales	[id number]	Insurance	[facility]  B	N	O	01/16/2025	01/16/2025	oxycodone hcl (ir) 5 mg tablet	8	2	Nimisha Albright	[id number]	Insurance	[facility] #[id number]  B	Y	O	01/15/2025	01/15/2025	tramadol hcl 50 mg tablet	21	7	Michaelle Morales	[id number]	Insurance	[facility] #[id number]  B	Y	B	12/16/2024	12/27/2024	alprazolam 2 mg tablet	60	30	Truong, Freddy	[id number]	Insurance	[facility] #[id number]  B	N	B	10/14/2024	11/12/2024	alprazolam 2 mg tablet	60	30	Truong, Freddy	[id number]	Insurance	[facility] #[id number]  B	N	B	09/08/2024	09/09/2024	alprazolam 2 mg tablet	60	30	Truong, Freddy	[id number]	Insurance	[facility] #[id number]  B	N	B	07/31/2024	08/13/2024	alprazolam 1 mg tablet	90	30	Truong, Freddy	[id number]	Insurance	[facility] #[id number]  B	N	B	05/14/2024	05/14/2024	alprazolam 1 mg tablet	90	30	Truong, Freddy	[id number]	Insurance	[facility] #[id number]  B	N	B	03/07/2024	03/10/2024	alprazolam 1 mg tablet	90	30	Truong, Freddy	[id number]	Insurance	[facility] #[id number]  B	N	B	02/16/2024	02/22/2024	alprazolam 0.5 mg tablet	90	30	Truong, Freddy	[id number]	Insurance	[facility] #[id number]  B	N	B	01/18/2024	01/21/2024	temazepam 15 mg capsule	30	30	Truong, Freddy	[id number]	Insurance	[facility] #[id number]

## 2025-01-20 NOTE — CHART NOTE - NSCHARTNOTEFT_GEN_A_CORE
Patient is a 50 y/o BRCA negative  F who had Stage II right invasive ductal carcinoma s/p ddACT followed by RT and had been on anastrozole since 2021. She had symptomatic hip pain and had MRI of the hip done 1/19/2024 which showed extensive bony metastases largest in the R iliac bone/ acetabulum. She had Pet/ CT done on 1/31/2024 which showed multiple FDG avid lucencies in the axial skeleton compatible with osseous metastases SUV 12.8 at the iliac R; 3.9 in the T5 L transverse process, She had bone biopsy done on the R iliac which was positive for malignant cells: ER 95%, CT 0%, Her2 negative (1). She started palliative RT to the R pelvic region. She started on Kisqali and Faslodex 3/12/2024. She had genomic testing done 3/2024 in [facility]. She completed RT to the R pelvic region with Dr Ibrahim 2/2024. She had interval Pet/ CT done on 7/25/2024 which showed progression of osseous metastases in the axial and appendicular skeleton with interval visualization of new lesions. resolution of soft tissue left chest wall and right iliac bone decreased in size and avidity from RT.     Palliative treatment options were reviewed and she was subsequently prescribed capecitabine: she has not started yet due to concerns about side effects and ongoing back pain. However, capecitabine has not been started.  ?  Lower back pain: We reviewed pain management options. Had trial of tramadol which she did not find effective. We reviewed oxycodone every 4 to 6 hours PRN severe pain - advised trial of 10 mg dose to see if pain is better controlled. Patient also seen by palliative care (Dr. Booker) today who prescribed tizanidine. She was scheduled for MRI spine last week but was unable to complete the test due to pain. She called Dr. Obando's office overnight endorsing intractable pain and was recommended to be admitted to the hospital.    Recommendations:  - Please admit to [facility] for pain control. She is not taking the oxycodone prescribed 5 mg Q4h consistently and takes Xanax at night and was concerned about taking oxycodone due to drug-drug interaction. Please consult palliative for more consistent pain control counseling including longer-term pain coverage for day and night.  - Please complete MRI as ordered outpatient to better assess extent of disease. Patient may require ativan for anxiety or sedation for procedure.   - continue with Xgeva; calcium remains WNL and no new dental issues.  - Please have Radiation Oncology assess patient for palliative radiation. If acceptable, please transfer patient to [facility] for inpatient radiation  - Will discuss with patient starting capecitabine  ?- Full consult in the am    ***************************************************************  Cheyenne Bonilla, PGY5  Fellow Hematology/Oncology  pager: [phone]   Available on Microsoft Teams  After 5pm or on weekends please contact  to page on-call fellow   *************************************************************** Patient is a 50 y/o BRCA negative  F who had Stage II right invasive ductal carcinoma s/p ddACT followed by RT and had been on anastrozole since 2021. She had symptomatic hip pain and had MRI of the hip done 1/19/2024 which showed extensive bony metastases largest in the R iliac bone/ acetabulum. She had Pet/ CT done on 1/31/2024 which showed multiple FDG avid lucencies in the axial skeleton compatible with osseous metastases SUV 12.8 at the iliac R; 3.9 in the T5 L transverse process, She had bone biopsy done on the R iliac which was positive for malignant cells: ER 95%, IN 0%, Her2 negative (1). She started palliative RT to the R pelvic region. She started on Kisqali and Faslodex 3/12/2024. She had genomic testing done 3/2024 in [facility]. She completed RT to the R pelvic region with Dr Ibrahim 2/2024. She had interval Pet/ CT done on 7/25/2024 which showed progression of osseous metastases in the axial and appendicular skeleton with interval visualization of new lesions. resolution of soft tissue left chest wall and right iliac bone decreased in size and avidity from RT.     Palliative treatment options were reviewed and she was subsequently prescribed capecitabine: she has not started yet due to concerns about side effects and ongoing back pain. However, capecitabine has not been started.    # Lower back pain:   Palliative care (Dr. Booker) saw patient on 1/17/25 and reviewed pain management options. Had trial of tramadol which she did not find effective. Reviewed oxycodone every 4 to 6 hours PRN severe pain - advised trial of 10 mg dose to see if pain is better controlled. Patient was also prescribed tizanidine. She was scheduled for MRI spine last week but was unable to complete the test due to pain.     Patient called Dr. Obando's office last night endorsing intractable back and RUQ pain (consistent with liver metastasis and osseous mets) and was recommended to be admitted to the hospital. Of note, she went to [facility] ED 1/14/25 endorsing same issue and was not assessed by Radiation Oncology to better control of pain for palliative purposes, received morphine which controlled the pain and was sent home.    Recommendations:  - Please admit to [facility] for pain control. She is not taking the oxycodone prescribed 5 mg Q4h consistently and takes Xanax at night and was concerned about taking oxycodone due to drug-drug interaction. Please consult palliative for more consistent pain control counseling including longer-term pain coverage for day and night.  - Please complete MRI spine as ordered outpatient to better assess extent of disease. Patient may require ativan for anxiety or sedation for procedure.   - continue with Xgeva; calcium remains WNL and no new dental issues.  - Please have Radiation Oncology assess patient for palliative radiation. If acceptable, please transfer patient to [facility] for inpatient radiation  - Will discuss with patient starting capecitabine  ?- Full consult in the am    ***************************************************************  Cheyenne Bonilla, PGY5  Fellow Hematology/Oncology  pager: [phone]   Available on Microsoft Teams  After 5pm or on weekends please contact  to page on-call fellow   *************************************************************** Patient is a 50 y/o BRCA negative  F who had Stage II right invasive ductal carcinoma s/p ddACT followed by RT and had been on anastrozole since 2021. She had symptomatic hip pain and had MRI of the hip done 1/19/2024 which showed extensive bony metastases largest in the R iliac bone/ acetabulum. She had Pet/ CT done on 1/31/2024 which showed multiple FDG avid lucencies in the axial skeleton compatible with osseous metastases SUV 12.8 at the iliac R; 3.9 in the T5 L transverse process, She had bone biopsy done on the R iliac which was positive for malignant cells: ER 95%, IN 0%, Her2 negative (1). She started palliative RT to the R pelvic region. She started on Kisqali and Faslodex 3/12/2024. She had genomic testing done 3/2024 in [facility]. She completed RT to the R pelvic region with Dr Ibrahim 2/2024. She had interval Pet/ CT done on 7/25/2024 which showed progression of osseous metastases in the axial and appendicular skeleton with interval visualization of new lesions. resolution of soft tissue left chest wall and right iliac bone decreased in size and avidity from RT.     Palliative treatment options were reviewed and she was subsequently prescribed capecitabine: she has not started yet due to concerns about side effects and ongoing back pain. However, capecitabine has not been started.    # Lower back pain:   Palliative care (Dr. Booker) saw patient on 1/17/25 and reviewed pain management options. Had trial of tramadol which she did not find effective. Reviewed oxycodone every 4 to 6 hours PRN severe pain - advised trial of 10 mg dose to see if pain is better controlled. Patient was also prescribed tizanidine. She was scheduled for MRI spine last week but was unable to complete the test due to pain.     Patient called Dr. Obando's office last night endorsing intractable back and RUQ pain (consistent with liver metastasis and osseous mets) and was recommended to go to [facility] to be assessed by Rad Onc, but called again when she arrived at [facility] and we advised that we would recommend she be admitted to the hospital. Of note, she went to [facility] ED 1/14/25 endorsing same issue, had a CT scan performed, was not assessed by Radiation Oncology to better control of pain for palliative purposes, received morphine which controlled the pain and was sent home.    Recommendations:  - Please admit to [facility] for pain control. She is not taking the oxycodone prescribed 5 mg Q4h consistently and takes Xanax at night and was concerned about taking oxycodone due to drug-drug interaction. Please consult palliative for more consistent pain control counseling including longer-term pain coverage for day and night.  - Please complete MRI spine as ordered outpatient to better assess extent of disease. Patient may require ativan for anxiety or sedation for procedure.   - continue with Xgeva; calcium remains WNL and no new dental issues.  - Please have Radiation Oncology assess patient for palliative radiation. If acceptable, please transfer patient to [facility] for inpatient radiation  - Will discuss with patient starting capecitabine  ?- Full consult in the am    ***************************************************************  Cheyenne Bonilla, PGY5  Fellow Hematology/Oncology  pager: [phone]   Available on [employer] Teams  After 5pm or on weekends please contact  to page on-call fellow   *************************************************************** Patient is a 48 y/o BRCA negative  F who had Stage II right invasive ductal carcinoma s/p ddACT followed by RT and had been on anastrozole since 2021. She had symptomatic hip pain and had MRI of the hip done 1/19/2024 which showed extensive bony metastases largest in the R iliac bone/ acetabulum. She had Pet/ CT done on 1/31/2024 which showed multiple FDG avid lucencies in the axial skeleton compatible with osseous metastases SUV 12.8 at the iliac R; 3.9 in the T5 L transverse process, She had bone biopsy done on the R iliac which was positive for malignant cells: ER 95%, NV 0%, Her2 negative (1). She started palliative RT to the R pelvic region. She started on Kisqali and Faslodex 3/12/2024. She had genomic testing done 3/2024 in [facility]. She completed RT to the R pelvic region with Dr Ibrahim 2/2024. She had interval Pet/ CT done on 7/25/2024 which showed progression of osseous metastases in the axial and appendicular skeleton with interval visualization of new lesions. resolution of soft tissue left chest wall and right iliac bone decreased in size and avidity from RT.     Palliative treatment options were reviewed and she was subsequently prescribed capecitabine: she has not started yet due to concerns about side effects and ongoing back pain. However, capecitabine has not been started.    # Lower back pain and RUQ pain:   Palliative care (Dr. Booker) saw patient on 1/17/25 and reviewed pain management options. Had trial of tramadol which she did not find effective. Reviewed oxycodone every 4 to 6 hours PRN severe pain - advised trial of 10 mg dose to see if pain is better controlled. Patient was also prescribed tizanidine. She was scheduled for MRI spine last week but was unable to complete the test due to pain.     Patient called Dr. Obando's office last night endorsing intractable back and RUQ pain (consistent with liver metastasis and osseous mets) and was recommended to go to [facility] to be assessed by Rad Onc, but called again when she arrived at [facility] and we advised that we would recommend she be admitted to the hospital. Of note, she went to [facility] ED 1/14/25 endorsing same issue, had a CT scan performed, was not assessed by Radiation Oncology to better control of pain for palliative purposes, received morphine which controlled the pain and was sent home.    Recommendations:  - Please admit to [facility] for pain control. She is not taking the oxycodone prescribed 5 mg Q4h consistently and takes Xanax at night and was concerned about taking oxycodone due to drug-drug interaction. Please consult palliative for more consistent pain control counseling including longer-term pain coverage for day and night.  - Please complete MRI spine as ordered outpatient to better assess extent of disease. Patient may require ativan for anxiety or sedation for procedure.   - continue with Xgeva; calcium remains WNL and no new dental issues.  - Please have Radiation Oncology assess patient for palliative radiation. If acceptable, please transfer patient to [facility] for inpatient radiation  - Will discuss with patient starting capecitabine  - Full consult in the am    ***************************************************************  Cheyenne Bonilla, PGY5  Fellow Hematology/Oncology  pager: [phone]   Available on [employer] Teams  After 5pm or on weekends please contact  to page on-call fellow   *************************************************************** Patient is a 48 y/o BRCA negative  F who had Stage II right invasive ductal carcinoma s/p ddACT followed by RT and had been on anastrozole since 2021. She had symptomatic hip pain and had MRI of the hip done 1/19/2024 which showed extensive bony metastases largest in the R iliac bone/ acetabulum. She had Pet/ CT done on 1/31/2024 which showed multiple FDG avid lucencies in the axial skeleton compatible with osseous metastases SUV 12.8 at the iliac R; 3.9 in the T5 L transverse process, She had bone biopsy done on the R iliac which was positive for malignant cells: ER 95%, KS 0%, Her2 negative (1). She started palliative RT to the R pelvic region. She started on Kisqali and Faslodex 3/12/2024. She had genomic testing done 3/2024 in [facility]. She completed RT to the R pelvic region with Dr Ibrahim 2/2024. She had interval Pet/ CT done on 7/25/2024 which showed progression of osseous metastases in the axial and appendicular skeleton with interval visualization of new lesions. resolution of soft tissue left chest wall and right iliac bone decreased in size and avidity from RT. Palliative treatment options were reviewed and she was subsequently prescribed capecitabine: she has not started yet due to concerns about side effects and ongoing back pain.     # Lower back pain and RUQ pain:   Palliative care (Dr. Booker) saw patient on 1/17/25 and reviewed pain management options. Had trial of tramadol which she did not find effective. Reviewed oxycodone every 4 to 6 hours PRN severe pain - advised trial of 10 mg dose to see if pain is better controlled. Patient was also prescribed tizanidine. She was scheduled for MRI spine last week but was unable to complete the test due to pain.     Patient called Dr. Obando's office last night endorsing intractable back and RUQ pain (consistent with liver metastasis and osseous mets) and was recommended to go to [facility] to be assessed by Rad Onc, but called again when she arrived at [facility] and we advised that we would recommend she be admitted to the hospital. Of note, she went to [facility] ED 1/14/25 endorsing same issue, had a CT scan performed, was not assessed by Radiation Oncology to better control of pain for palliative purposes, received morphine which controlled the pain and was sent home.    Recommendations:  - Please admit to [facility] for pain control. She is not taking the oxycodone prescribed 5 mg Q4h consistently and takes Xanax at night and was concerned about taking oxycodone due to drug-drug interaction. Please consult palliative for more consistent pain control counseling including longer-term pain coverage for day and night.  - Please complete MRI spine as ordered outpatient to better assess extent of disease. Patient may require ativan for anxiety or sedation for procedure.   - continue with Xgeva; calcium remains WNL and no new dental issues.  - Please have Radiation Oncology assess patient for palliative radiation. If acceptable, please transfer patient to [facility] for inpatient radiation  - Will discuss with patient starting capecitabine  - Full consult in the am    ***************************************************************  Cheyenne Bonilla, PGY5  Fellow Hematology/Oncology  pager: [phone]   Available on [employer] Teams  After 5pm or on weekends please contact  to page on-call fellow   *************************************************************** Patient is a 50 y/o BRCA negative  F who had Stage II right invasive ductal carcinoma s/p ddACT followed by RT and had been on anastrozole since 2021. She had symptomatic hip pain and had MRI of the hip done 1/19/2024 which showed extensive bony metastases largest in the R iliac bone/ acetabulum. She had Pet/ CT done on 1/31/2024 which showed multiple FDG avid lucencies in the axial skeleton compatible with osseous metastases SUV 12.8 at the iliac R; 3.9 in the T5 L transverse process, She had bone biopsy done on the R iliac which was positive for malignant cells: ER 95%, LA 0%, Her2 negative (1). She started palliative RT to the R pelvic region. She started on Kisqali and Faslodex 3/12/2024. She had genomic testing done 3/2024 in [facility]. She completed RT to the R pelvic region with Dr Ibrahim 2/2024. She had interval Pet/ CT done on 7/25/2024 which showed progression of osseous metastases in the axial and appendicular skeleton with interval visualization of new lesions. resolution of soft tissue left chest wall and right iliac bone decreased in size and avidity from RT. Palliative treatment options were reviewed and she was subsequently prescribed capecitabine: she has not started yet due to concerns about side effects and ongoing back pain.     # Lower back pain and RUQ pain:   Palliative care (Dr. Booker) saw patient on 1/17/25 and reviewed pain management options. Had trial of tramadol which she did not find effective. Reviewed oxycodone every 4 to 6 hours PRN severe pain - advised trial of 10 mg dose to see if pain is better controlled. Patient was also prescribed tizanidine. She was scheduled for MRI spine last week but was unable to complete the test due to pain.     Patient called Dr. Obando's office last night endorsing intractable back and RUQ pain (consistent with liver metastasis and osseous mets) and was recommended to go to [facility] to be assessed by Rad Onc, but called again when she arrived at [facility] and we advised that we would recommend she be admitted to the hospital. Of note, she went to [facility] ED 1/14/25 endorsing same issue, had a CT scan performed (which showed diffuse osseous mets and innumerable hypodense ill-defined hepatic lesions which measure up   to 1.4 cm in the right hepatic lobe, concerning for metastases)., was not assessed by Radiation Oncology to better control of pain for palliative purposes, received morphine which controlled the pain and was sent home.    Recommendations:  - Please admit to [facility] for pain control. She is not taking the oxycodone prescribed 5 mg Q4h consistently and takes Xanax at night and was concerned about taking oxycodone due to drug-drug interaction. Please consult palliative for more consistent pain control counseling including longer-term pain coverage for day and night.  - Please complete MRI spine as ordered outpatient to better assess extent of disease. Patient may require ativan for anxiety or sedation for procedure.   - continue with Xgeva; calcium remains WNL and no new dental issues.  - Please have Radiation Oncology assess patient for palliative radiation. If acceptable, please transfer patient to [facility] for inpatient radiation  - Will discuss with patient starting capecitabine  - Full consult in the am    ***************************************************************  Cheyenne Bonilla, PGY5  Fellow Hematology/Oncology  pager: [phone]   Available on [employer] Teams  After 5pm or on weekends please contact  to page on-call fellow   ***************************************************************

## 2025-01-20 NOTE — CONSULT NOTE ADULT - ASSESSMENT
48 y/o BRCA negative  F who had Stage II right invasive ductal carcinoma s/p ddACT followed by RT and had been on anastrozole since 2021. She had POD on 1/2024 and R iliac bone biopsy that is consistent with metastatic breast cancer to the bones and ER positive, CA negative, Her2 negative. She was on Faslodex and Truqap, however recent PET/CT 12/29/2024 which showed progression of disease. Palliative treatment options were reviewed and she was subsequently prescribed capecitabine: she has not started yet due to concerns about side effects and ongoing back pain. Pt now admitted to [facility] for intractable back pain for last few days. Pt initially went to [facility] ED 1/14/25 endorsing same issue, had a CT scan performed (which showed diffuse osseous mets and innumerable hypodense ill-defined hepatic lesions which measure up   to 1.4 cm in the right hepatic lobe, concerning for metastases)., was not assessed by Radiation Oncology to better control of pain for palliative purposes, received morphine which controlled the pain and was sent home.         # Metastatic breast cancer  # Lower back pain, cancer related    *MRI Thoracic spine done outpatient on 1/17/25 shows- Partially treated diffuse osseous metastases. Largest residual enhancing osteolytic metastasis within the field of view arising within the posterior spinous processes T5.  - Obtain palliative care consult for pain control   - daily CBC w diff, CMP            --- incomplete---  48 y/o BRCA negative  F who had Stage II right invasive ductal carcinoma s/p ddACT followed by RT and had been on anastrozole since 2021. She had POD on 1/2024 and R iliac bone biopsy that is consistent with metastatic breast cancer to the bones and ER positive, ID negative, Her2 negative. She was on Faslodex and Truqap, however recent PET/CT 12/29/2024 which showed progression of disease. Palliative treatment options were reviewed and she was subsequently prescribed capecitabine: she has not started yet due to concerns about side effects and ongoing back pain. Pt initially went to [facility] ED 1/14/25 endorsing same issue, had a CT scan performed (which showed diffuse osseous mets and innumerable hypodense ill-defined hepatic lesions which measure up  to 1.4 cm in the right hepatic lobe, concerning for metastases)., was not assessed by Radiation Oncology to better control of pain for palliative purposes, received morphine which controlled the pain and was sent home.  Pt now admitted to [facility] for intractable back pain for last few days      # Metastatic breast cancer  # Lower back pain, cancer related  *MRI Thoracic spine done outpatient on 1/17/25 shows- Partially treated diffuse osseous metastases. Largest residual enhancing osteolytic metastasis within the field of view arising within the posterior spinous processes T5.  - recommend dilaudid IV PRN for moderate and severe pain every 3 -4 hours until recommendation from palliative care team  - Obtain palliative care consult for pain control   - Obtain Radiation oncology consult to evaluate for radiation treatment of bone metastasis  - daily CBC w diff, CMP  - Obtain MRI Lumbar spine under anesthesia (Pt could not get MRI lumbar spine outpatient due to pain and was able to complete only MRI thoracic spine)  - will discuss with outpatient oncology team regarding DMT      Akin Randle MD  PGY4  Hematology-Oncology Fellow  [facility]

## 2025-01-20 NOTE — H&P ADULT - NSHPSOCIALHISTORY_GEN_ALL_CORE
lives alone but moved in with mother 5 days ago due to pain  denies etoh/ivdu/tob  former research analyst

## 2025-01-20 NOTE — CONSULT NOTE ADULT - SUBJECTIVE AND OBJECTIVE BOX
HPI: Patient is a 48 y/o BRCA negative  F who had Stage II right invasive ductal carcinoma s/p ddACT followed by RT and had been on anastrozole since 2021. She had symptomatic hip pain and had MRI of the hip done 1/19/2024 which showed extensive bony metastases largest in the R iliac bone/ acetabulum. She had Pet/ CT done on 1/31/2024 which showed multiple FDG avid lucencies in the axial skeleton compatible with osseous metastases SUV 12.8 at the iliac R; 3.9 in the T5 L transverse process, She had bone biopsy done on the R iliac which was positive for malignant cells: ER 95%, TX 0%, Her2 negative (1). She started palliative RT to the R pelvic region. She started on Kisqali and Faslodex 3/12/2024. She had genomic testing done 3/2024 in [facility]. She completed RT to the R pelvic region with Dr Ibrahim 2/2024. She had interval Pet/ CT done on 7/25/2024 which showed progression of osseous metastases in the axial and appendicular skeleton with interval visualization of new lesions. resolution of soft tissue left chest wall and right iliac bone decreased in size and avidity from RT. Palliative treatment options were reviewed and she was subsequently prescribed capecitabine: she has not started yet due to concerns about side effects and ongoing back pain.     Palliative care (Dr. Booker) saw patient on 1/17/25 and reviewed pain management options. Had trial of tramadol which she did not find effective. Reviewed oxycodone every 4 to 6 hours PRN severe pain - advised trial of 10 mg dose to see if pain is better controlled. Patient was also prescribed tizanidine. She was scheduled for MRI spine last week but was unable to complete the test due to pain.     Patient called Dr. Obando's office last night endorsing intractable back and RUQ pain (consistent with liver metastasis and osseous mets) and was recommended to go to [facility] to be assessed by Rad Onc, but called again when she arrived at [facility] and we advised that we would recommend she be admitted to the hospital. Of note, she went to [facility] ED 1/14/25 endorsing same issue, had a CT scan performed (which showed diffuse osseous mets and innumerable hypodense ill-defined hepatic lesions which measure up to 1.4 cm in the right hepatic lobe, concerning for metastases)., was not assessed by Radiation Oncology to better control of pain for palliative purposes, received morphine which controlled the pain and was sent home.        PAST MEDICAL & SURGICAL HISTORY:  IBS (irritable bowel syndrome)      History of cholecystectomy      History of myomectomy      Status post excision of fibroadenoma of breast          Allergies    No Known Allergies    Intolerances        MEDICATIONS  (STANDING):    MEDICATIONS  (PRN):      FAMILY HISTORY:      SOCIAL HISTORY: No EtOH, no tobacco    REVIEW OF SYSTEMS: as per HPI    Height (cm): 165.1 (01-20 @ 01:22)  Weight (kg): 47.6 (01-20 @ 01:22)  BMI (kg/m2): 17.5 (01-20 @ 01:22)  BSA (m2): 1.5 (01-20 @ 01:22)    T(F): 99.5 (01-20-25 @ 07:05), Max: 99.5 (01-20-25 @ 07:05)  HR: 74 (01-20-25 @ 07:05)  BP: 104/62 (01-20-25 @ 07:05)  RR: 16 (01-20-25 @ 07:05)  SpO2: 95% (01-20-25 @ 07:05)  Wt(kg): --    GENERAL: NAD, well-developed  HEAD:  Atraumatic, Normocephalic  EYES: EOMI, PERRLA, conjunctiva and sclera clear  NECK: Supple, No JVD  CHEST/LUNG: Clear to auscultation bilaterally; No wheeze  HEART: Regular rate and rhythm; No murmurs, rubs, or gallops  ABDOMEN: Soft, Nontender, Nondistended; Bowel sounds present  EXTREMITIES:  2+ Peripheral Pulses, No clubbing, cyanosis, or edema  NEUROLOGY: non-focal  SKIN: No rashes or lesions                          12.9   8.20  )-----------( 112      ( 20 Jan 2025 03:04 )             39.4       01-20    139  |  100  |  8   ----------------------------<  100[H]  4.0   |  26  |  0.57    Ca    9.1      20 Jan 2025 03:04  Mg     1.9     01-20    TPro  6.8  /  Alb  3.8  /  TBili  1.3[H]  /  DBili  x   /  AST  334[H]  /  ALT  182[H]  /  AlkPhos  230[H]  01-20      Magnesium: 1.9 mg/dL (01-20 @ 03:04)           HPI: Patient is a 48 y/o BRCA negative  F who had Stage II right invasive ductal carcinoma s/p ddACT followed by RT and had been on anastrozole since 2021. She had symptomatic hip pain and had MRI of the hip done 1/19/2024 which showed extensive bony metastases largest in the R iliac bone/ acetabulum. She had Pet/ CT done on 1/31/2024 which showed multiple FDG avid lucencies in the axial skeleton compatible with osseous metastases SUV 12.8 at the iliac R; 3.9 in the T5 L transverse process, She had bone biopsy done on the R iliac which was positive for malignant cells: ER 95%, KS 0%, Her2 negative (1). She started palliative RT to the R pelvic region. She started on Kisqali and Faslodex 3/12/2024. She had genomic testing done 3/2024 in [facility]. She completed RT to the R pelvic region with Dr Ibrahim 2/2024. She had interval Pet/ CT done on 7/25/2024 which showed progression of osseous metastases in the axial and appendicular skeleton with interval visualization of new lesions. resolution of soft tissue left chest wall and right iliac bone decreased in size and avidity from RT. Palliative treatment options were reviewed and she was subsequently prescribed capecitabine: she has not started yet due to concerns about side effects and ongoing back pain.     Palliative care (Dr. Booker) saw patient on 1/17/25 and reviewed pain management options. Had trial of tramadol which she did not find effective. Reviewed oxycodone every 4 to 6 hours PRN severe pain - advised trial of 10 mg dose to see if pain is better controlled. Patient was also prescribed tizanidine. She was scheduled for MRI spine last week but was unable to complete the test due to pain.     Patient called Dr. Obando's office last night endorsing intractable back and RUQ pain (consistent with liver metastasis and osseous mets) and was recommended to go to [facility] to be assessed by Rad Onc, but called again when she arrived at [facility] and we advised that we would recommend she be admitted to the hospital. Of note, she went to [facility] ED 1/14/25 endorsing same issue, had a CT scan performed (which showed diffuse osseous mets and innumerable hypodense ill-defined hepatic lesions which measure up to 1.4 cm in the right hepatic lobe, concerning for metastases)., was not assessed by Radiation Oncology to better control of pain for palliative purposes, received morphine which controlled the pain and was sent home.        PAST MEDICAL & SURGICAL HISTORY:  IBS (irritable bowel syndrome)      History of cholecystectomy      History of myomectomy      Status post excision of fibroadenoma of breast          Allergies    No Known Allergies    Intolerances        MEDICATIONS  (STANDING):    MEDICATIONS  (PRN):      FAMILY HISTORY:      SOCIAL HISTORY: No EtOH, no tobacco    REVIEW OF SYSTEMS: as per HPI    Height (cm): 165.1 (01-20 @ 01:22)  Weight (kg): 47.6 (01-20 @ 01:22)  BMI (kg/m2): 17.5 (01-20 @ 01:22)  BSA (m2): 1.5 (01-20 @ 01:22)    T(F): 99.5 (01-20-25 @ 07:05), Max: 99.5 (01-20-25 @ 07:05)  HR: 74 (01-20-25 @ 07:05)  BP: 104/62 (01-20-25 @ 07:05)  RR: 16 (01-20-25 @ 07:05)  SpO2: 95% (01-20-25 @ 07:05)  Wt(kg): --    Physical exam...                          12.9   8.20  )-----------( 112      ( 20 Jan 2025 03:04 )             39.4       01-20    139  |  100  |  8   ----------------------------<  100[H]  4.0   |  26  |  0.57    Ca    9.1      20 Jan 2025 03:04  Mg     1.9     01-20    TPro  6.8  /  Alb  3.8  /  TBili  1.3[H]  /  DBili  x   /  AST  334[H]  /  ALT  182[H]  /  AlkPhos  230[H]  01-20      Magnesium: 1.9 mg/dL (01-20 @ 03:04)           HPI: Patient is a 48 y/o BRCA negative  F who had Stage II right invasive ductal carcinoma s/p ddACT followed by RT and had been on anastrozole since 2021. She had symptomatic hip pain and had MRI of the hip done 1/19/2024 which showed extensive bony metastases largest in the R iliac bone/ acetabulum. She had Pet/ CT done on 1/31/2024 which showed multiple FDG avid lucencies in the axial skeleton compatible with osseous metastases SUV 12.8 at the iliac R; 3.9 in the T5 L transverse process, She had bone biopsy done on the R iliac which was positive for malignant cells: ER 95%, MD 0%, Her2 negative (1). She started palliative RT to the R pelvic region. She started on Kisqali and Faslodex 3/12/2024. She had genomic testing done 3/2024 in [facility]. She completed RT to the R pelvic region with Dr Ibrahim 2/2024. She had interval Pet/ CT done on 7/25/2024 which showed progression of osseous metastases in the axial and appendicular skeleton with interval visualization of new lesions. resolution of soft tissue left chest wall and right iliac bone decreased in size and avidity from RT. Palliative treatment options were reviewed and she was subsequently prescribed capecitabine: she has not started yet due to concerns about side effects and ongoing back pain.     Palliative care (Dr. Booker) saw patient on 1/17/25 and reviewed pain management options. Had trial of tramadol which she did not find effective. Reviewed oxycodone every 4 to 6 hours PRN severe pain - advised trial of 10 mg dose to see if pain is better controlled. Patient was also prescribed tizanidine. She was scheduled for MRI spine last week but was unable to complete the test due to pain.     Patient called Dr. Obando's office last night endorsing intractable back and RUQ pain (consistent with liver metastasis and osseous mets) and was recommended to go to [facility] to be assessed by Rad Onc, but called again when she arrived at [facility] and we advised that we would recommend she be admitted to the hospital. Of note, she went to [facility] ED 1/14/25 endorsing same issue, had a CT scan performed (which showed diffuse osseous mets and innumerable hypodense ill-defined hepatic lesions which measure up to 1.4 cm in the right hepatic lobe, concerning for metastases)., was not assessed by Radiation Oncology to better control of pain for palliative purposes, received morphine which controlled the pain and was sent home.        PAST MEDICAL & SURGICAL HISTORY:  IBS (irritable bowel syndrome)      History of cholecystectomy      History of myomectomy      Status post excision of fibroadenoma of breast          Allergies    No Known Allergies    Intolerances        MEDICATIONS  (STANDING):    MEDICATIONS  (PRN):      FAMILY HISTORY:      SOCIAL HISTORY: No EtOH, no tobacco    REVIEW OF SYSTEMS: as per HPI    Height (cm): 165.1 (01-20 @ 01:22)  Weight (kg): 47.6 (01-20 @ 01:22)  BMI (kg/m2): 17.5 (01-20 @ 01:22)  BSA (m2): 1.5 (01-20 @ 01:22)    T(F): 99.5 (01-20-25 @ 07:05), Max: 99.5 (01-20-25 @ 07:05)  HR: 74 (01-20-25 @ 07:05)  BP: 104/62 (01-20-25 @ 07:05)  RR: 16 (01-20-25 @ 07:05)  SpO2: 95% (01-20-25 @ 07:05)  Wt(kg): --    Physical exam:  Constitutional: In Mild distress  Heart: RRR  Lungs: CTAB, no added sound  Neuro: nonfocal, sensation intact                            12.9   8.20  )-----------( 112      ( 20 Jan 2025 03:04 )             39.4       01-20    139  |  100  |  8   ----------------------------<  100[H]  4.0   |  26  |  0.57    Ca    9.1      20 Jan 2025 03:04  Mg     1.9     01-20    TPro  6.8  /  Alb  3.8  /  TBili  1.3[H]  /  DBili  x   /  AST  334[H]  /  ALT  182[H]  /  AlkPhos  230[H]  01-20      Magnesium: 1.9 mg/dL (01-20 @ 03:04)

## 2025-01-20 NOTE — ED ADULT NURSE NOTE - OBJECTIVE STATEMENT
48 y/o female complaining of back pain. Pt is A&Ox4, breathing spontaneously speaking in full sentences but slow due to pain, with pmh of Metastatic breast ca now stage 4, IBS, presents to the ED for evaluation of increased back pain not improved by home remedies. PT endorsing taking oxycodone at 1825 pm with no relief, states at last oncology treatment and visit that if the pain became unbearable to come to the ED. Pt also endorsing decreased appetite, and ability to get comfortable, currently laying only on the right side. Pt denies chest pain, sob, dizziness, recent travel or sick contacts. PT lung sounds clear and equal bilat abd soft nontender nondistended x4 quadrants, back pain is right sided and tender to palpitations. Pt placed in patient gown bed in lowest position to maintain safety. Commode placed at bedside for comfort and ease of using the bathroom.

## 2025-01-20 NOTE — ED PROVIDER NOTE - ATTENDING CONTRIBUTION TO CARE
Morales Roberts MD (Attending Physician):    I performed a history and physical exam of the patient and discussed their management with the resident/fellow/ACP/student. I have reviewed the resident/fellow/ACP/student note and agree with the documented findings and plan of care, except as noted. I have personally performed a substantive portion of the visit including all aspects of the medical decision making. My medical decision making and observations are found below. Please refer to any progress notes for updates on clinical course.    HPI:  50yo F with pmhx of BRCA positive Stage II right invasive ductal carcinoma s/p ddACT followed by RT and had been on anastrozole since 2021, extensive osseous and liver mets, seen by palliative care 1/17 prescribed trial of oxycodone, tramadol, and tizanidine without relief p/w back pain that has progressively worsened and has been unable to get out of bed the past 4 days. Also unable to eat as she cannot sit upright due to pain. Has been constipated but had a bowel movement yesterday.  Barely makes it to the bathroom due to pain and uses gloves as she cannot stand long enough due to the pain to wash her hands. Reports right lower back is the worst and right abdomen feels like a bullet wound.  At best, oxycodone brought pain from 9 to an 8. Denies fever, cp, sob, saddle anesthesia, bowel incontinence, constipation, urinary retention/incontinence, LE weakness/sensory changes.    PE:  GEN - +In moderate discomfort, A&Ox3  HEAD - NC/AT  EYES - PERRL, EOMI  ENT - Airway patent, +mucous membranes dry  NECK - Supple, non-tender without lymphadenopathy, no masses  PULMONARY - Normal wob, CTA b/l, symmetric breath sounds, no W/R/R, satting 98% on RA  CARDIAC - +S1S2, RRR, no M/G/R, no JVD  ABDOMEN - +BS, ND, +mildly TTP in RUQ, soft, no guarding, no rebound, no masses, no rigidity   - No CVA TTP b/l  BACK - No midline tenderness. +B/l para-lumbar areas TTP (R>L).  EXTREMITIES - FROM, symmetric pulses, no edema. B/l calves NT.  SKIN - No rash or bruising  NEUROLOGIC - Alert, speech clear, moving all extremities spontaneously, CN II-XII grossly intact, no pronator drift, normal gait, strength and sensation grossly intact, FTN negative b/l  PSYCH - Normal mood/affect, normal insight    MDM:  DDx includes, but not limited to: metastatic disease from primary breast CA causing worsening back pain. Low suspicion for spinal cord compression or kidney stone. Will evaluate for liver mets due to RUQ pain. ekg, RUQ US, labs, pain meds, IVF, oncology consult. Will most likely require admission for pain control.

## 2025-01-20 NOTE — H&P ADULT - ASSESSMENT
50yo F BRCA pmh Stage II right invasive ductal carcinoma s/p ddACT followed by RT and had been on anastrozole since 2021, extensive osseous and liver mets, seen by palliative care 1/17 prescribed trial of oxycodone, tramadol, and tizanidine without relief admitted 1/19 with intractable cancer pain pending rad onc and palliative eval.

## 2025-01-20 NOTE — ED PROVIDER NOTE - PROGRESS NOTE DETAILS
Matthew Horton PGY3:  Pt's pain is currently a 4/10. Spke with [facility] who accepted Pt for admission. Updated pt and sister about plan and they are agreeable.

## 2025-01-21 NOTE — CONSULT NOTE ADULT - PROBLEM SELECTOR RECOMMENDATION 5
- chaplaincy referral placed  - palliative SW referral placed     In the event of worsening symptoms, please contact the Palliative Medicine team via pager (if the patient is at [facility] #[phone] or if the patient is at [facility] #[phone]) The Geriatric and Palliative Medicine service has coverage 24 hours a day/ 7 days a week to provide medical recommendations regarding symptom management needs via telephone.    Note incomplete until attested by attending.

## 2025-01-21 NOTE — CONSULT NOTE ADULT - SUBJECTIVE AND OBJECTIVE BOX
HPI:   48yo F BRCA pmh Stage II right invasive ductal carcinoma s/p ddACT followed by RT and had been on anastrozole since 2021, extensive osseous and liver mets, seen by palliative care 1/17 prescribed trial of oxycodone, tramadol, and tizanidine without relief.  Pain has been increases daily and unable to get out of bed the past 4 days.  Also unable to eat as she cannot sit upright due to pain.  Has been constipated but had a bowel movement yesterday.  Barely makes it to the bathroom due to pain and uses gloves as she cannot stand long enough due to the pain to wash her hands.  Reports right lower back is the worst and right abdomen feels like a bullet wound.  At best, oxycodone brought pain from 9 to an 8.    In ED: 1L LR, 0.5mg dilaudid @5am, 2mg morphine x 2, zofran (20 Jan 2025 10:49)    GaP team consulted for pain management.     PERTINENT PM/SXH:   IBS (irritable bowel syndrome)      History of cholecystectomy    History of myomectomy    Status post excision of fibroadenoma of breast      FAMILY HISTORY:  FH: stomach cancer (Mother)      Family Hx substance abuse [ ]yes [ ]no  ITEMS NOT CHECKED ARE NOT PRESENT    SOCIAL HISTORY:   Significant other/partner[ ]  Children[ ]  Sikh/Spirituality:  Substance hx:  [ ]   Tobacco hx:  [ ]   Alcohol hx: [ ]   Home Opioid hx:  [x ] I-Stop Reference No: [id number]  Living Situation: [ ]Home  [ ]Long term care  [ ]Rehab [ ]Other      PDI	Current Rx	Drug Type	Rx Written	Rx Dispensed	Drug	Quantity	Days Supply	Prescriber Name	Prescriber PO #	Payment Method	Dispenser  A	Y	O	01/17/2025	01/17/2025	oxycodone hcl (ir) 5 mg tablet	30	5	Michaelle Morales	[id number]	Insurance	[facility]  B	N	O	01/16/2025	01/16/2025	oxycodone hcl (ir) 5 mg tablet	8	2	Nimisha Albright	[id number]	Insurance	[facility] #[id number]  B	Y	O	01/15/2025	01/15/2025	tramadol hcl 50 mg tablet	21	7	Michaelle Morales	[id number]	Insurance	[facility] #[id number]  B	Y	B	12/16/2024	12/27/2024	alprazolam 2 mg tablet	60	30	Truong, Freddy	[id number]	Insurance	[facility] #[id number]  B	N	B	10/14/2024	11/12/2024	alprazolam 2 mg tablet	60	30	Truong, [facility]	[id number]	Insurance	[facility] #[id number]  B	N	B	09/08/2024	09/09/2024	alprazolam 2 mg tablet	60	30	Truong, [facility]	[id number]	Insurance	[facility] #[id number]  B	N	B	07/31/2024	08/13/2024	alprazolam 1 mg tablet	90	30	Truong, [facility]	[id number]	Insurance	[facility] #[id number]  B	N	B	05/14/2024	05/14/2024	alprazolam 1 mg tablet	90	30	Truong, [facility]	[id number]	Insurance	[facility] #[id number]  B	N	B	03/07/2024	03/10/2024	alprazolam 1 mg tablet	90	30	Truong, [facility]	[id number]	Insurance	[facility] #[id number]  B	N	B	02/16/2024	02/22/2024	alprazolam 0.5 mg tablet	90	30	Truong, [facility]	[id number]	Insurance	[facility] #[id number]    ADVANCE DIRECTIVES:    DNR/MOLST  [ ]  Living Will  [ ]   DECISION MAKER(s):  [ ] Health Care Proxy(s)  [ ] Surrogate(s)  [ ] Guardian           Name(s): Phone Number(s):    BASELINE (I)ADL(s) (prior to admission):  [place]: [ ]Total  [ ] Moderate [ ]Dependent    Allergies    Keflex (Other)    Intolerances    MEDICATIONS  (STANDING):  cholecalciferol 2000 Unit(s) Oral daily  enoxaparin Injectable 40 milliGRAM(s) SubCutaneous every 24 hours  lactated ringers. 1000 milliLiter(s) (100 mL/Hr) IV Continuous <Continuous>  melatonin 3 milliGRAM(s) Oral once  polyethylene glycol 3350 17 Gram(s) Oral two times a day  senna 2 Tablet(s) Oral at bedtime  sertraline 100 milliGRAM(s) Oral daily    MEDICATIONS  (PRN):  ALPRAZolam 1 milliGRAM(s) Oral daily PRN anxiety  ALPRAZolam 3 milliGRAM(s) Oral at bedtime PRN anxiety  aluminum hydroxide/magnesium hydroxide/simethicone Suspension 30 milliLiter(s) Oral every 4 hours PRN Dyspepsia  HYDROmorphone  Injectable 0.5 milliGRAM(s) IV Push every 6 hours PRN Severe Pain (7 - 10)  HYDROmorphone  Injectable 0.2 milliGRAM(s) IV Push every 3 hours PRN Moderate Pain (4 - 6)  HYDROmorphone  Injectable 0.5 milliGRAM(s) IV Push Once PRN Severe Pain (7 - 10)  magnesium hydroxide Suspension 30 milliLiter(s) Oral daily PRN Constipation  ondansetron Injectable 4 milliGRAM(s) IV Push every 8 hours PRN Nausea and/or Vomiting  tiZANidine 2 milliGRAM(s) Oral at bedtime PRN Muscle Spasm    PRESENT SYMPTOMS: [ ]Unable to self-report  [ ] CPOT [ ] PAINADs [ ] RDOS  Source if other than patient:  [ ]Family   [ ]Team     Pain: [ ]yes [ ]no  QOL impact -   Location -                    Aggravating factors -  Quality -  Radiation -  Timing-  Severity (0-10 scale):  Minimal acceptable level (0-10 scale):     CPOT:    https://www.[facility].org/getattachment/[email or web address]/Critical-Care-Pain-Observation-Tool-(CPOT)    PAIN AD Score:   http://geriatrictoolkit.[facility]/cog/painad.pdf (press ctrl +  left click to view)    Dyspnea:                           [ ]Mild [ ]Moderate [ ]Severe      RDOS:  0 to 2  minimal or no respiratory distress   3  mild distress  4 to 6 moderate distress  >7 severe distress  https://homecareinformation.net/handouts/hen/Respiratory_Distress_Observation_Scale.pdf (Ctrl +  left click to view)     Anxiety:                             [ ]Mild [ ]Moderate [ ]Severe  Fatigue:                             [ ]Mild [ ]Moderate [ ]Severe  Nausea:                             [ ]Mild [ ]Moderate [ ]Severe  Loss of appetite:              [ ]Mild [ ]Moderate [ ]Severe  Constipation:                    [ ]Mild [ ]Moderate [ ]Severe    PCSSQ[Palliative Care Spiritual Screening Question]   Severity (0-10):  Score of 4 or > indicate consideration of Chaplaincy referral.  Chaplaincy Referral: [ ] yes [ ] refused [ ] following [ ] Deferred     Caregiver [place]? : [ ] yes [ ] no [ ] Deferred [ ] Declined             Social work referral [ ] Patient & Family Centered Care Referral [ ]     Anticipatory Grief present?:  [ ] yes [ ] no  [ ] Deferred                  Social work referral [ ] Chaplaincy Referral[ ]      Other Symptoms:  [ ]All other review of systems negative     Palliative Performance Status Version 2:         %    http://[facility].org/files/news/palliative_performance_scale_ppsv2.pdf  PHYSICAL EXAM:  Vital Signs Last 24 Hrs  T(C): 36.5 (20 Jan 2025 20:24), Max: 37.5 (20 Jan 2025 10:49)  T(F): 97.7 (20 Jan 2025 20:24), Max: 99.5 (20 Jan 2025 10:49)  HR: 72 (20 Jan 2025 20:24) (71 - 79)  BP: 115/73 (20 Jan 2025 20:24) (102/63 - 115/73)  BP(mean): 76 (20 Jan 2025 10:49) (76 - 76)  RR: 18 (20 Jan 2025 20:24) (18 - 18)  SpO2: 95% (20 Jan 2025 20:24) (93% - 95%)    Parameters below as of 20 Jan 2025 20:24  Patient On (Oxygen Delivery Method): room air     I&O's Summary    20 Jan 2025 07:01  -  21 Jan 2025 07:00  --------------------------------------------------------  IN: 1100 mL / OUT: 0 mL / NET: 1100 mL      GENERAL: [ ]Cachexia    [ ]Alert  [ ]Oriented x   [ ]Lethargic  [ ]Unarousable  [ ]Verbal  [ ]Non-Verbal  Behavioral:   [ ] Anxiety  [ ] Delirium [ ] Agitation [ ] Other  HEENT:  [ ]Normal   [ ]Dry mouth   [ ]ET Tube/Trach  [ ]Oral lesions  PULMONARY:   [ ]Clear [ ]Tachypnea  [ ]Audible excessive secretions   [ ]Rhonchi        [ ]Right [ ]Left [ ]Bilateral  [ ]Crackles        [ ]Right [ ]Left [ ]Bilateral  [ ]Wheezing     [ ]Right [ ]Left [ ]Bilateral  [ ]Diminished breath sounds [ ]right [ ]left [ ]bilateral  CARDIOVASCULAR:    [ ]Regular [ ]Irregular [ ]Tachy  [ ]Per [ ]Murmur [ ]Other  GASTROINTESTINAL:  [ ]Soft  [ ]Distended   [ ]+BS  [ ]Non tender [ ]Tender  [ ]Other [ ]PEG [ ]OGT/ NGT  Last BM:  GENITOURINARY:  [ ]Normal [ ] Incontinent   [ ]Oliguria/Anuria   [ ]Marcial  MUSCULOSKELETAL:   [ ]Normal   [ ]Weakness  [ ]Bed/Wheelchair bound [ ]Edema  NEUROLOGIC:   [ ]No focal deficits  [ ]Cognitive impairment  [ ]Dysphagia [ ]Dysarthria [ ]Paresis [ ]Other   SKIN:   [ ]Normal  [ ]Rash  [ ]Other  [ ]Pressure ulcer(s)       Present on admission [ ]y [ ]n    CRITICAL CARE:  [ ] Shock Present  [ ]Septic [ ]Cardiogenic [ ]Neurologic [ ]Hypovolemic  [ ]  Vasopressors [ ]  Inotropes   [ ]Respiratory failure present [ ]Mechanical ventilation [ ]Non-invasive ventilatory support [ ]High flow    [ ]Acute  [ ]Chronic [ ]Hypoxic  [ ]Hypercarbic [ ]Other  [ ]Other organ failure     LABS:                        12.9   8.20  )-----------( 112      ( 20 Jan 2025 03:04 )             39.4   01-20    139  |  100  |  8   ----------------------------<  100[H]  4.0   |  26  |  0.57    Ca    9.1      20 Jan 2025 03:04  Mg     1.9     01-20    TPro  6.8  /  Alb  3.8  /  TBili  1.3[H]  /  DBili  x   /  AST  334[H]  /  ALT  182[H]  /  AlkPhos  230[H]  01-20      Urinalysis Basic - ( 20 Jan 2025 03:04 )    Color: x / Appearance: x / SG: x / pH: x  Gluc: 100 mg/dL / Ketone: x  / Bili: x / Urobili: x   Blood: x / Protein: x / Nitrite: x   Leuk Esterase: x / RBC: x / WBC x   Sq Epi: x / Non Sq Epi: x / Bacteria: x      RADIOLOGY & ADDITIONAL STUDIES:    PROTEIN CALORIE MALNUTRITION PRESENT: [ ]mild [ ]moderate [ ]severe [ ]underweight [ ]morbid obesity  https://www.andeal.org/vault/2440/web/files/ONC/Table_Clinical%20Characteristics%20to%20Document%20Malnutrition-White%20JV%20et%20al%202012.pdf    Height (cm): 165.1 (01-20-25 @ 14:17), 165.1 (01-14-25 @ 02:37), 165.1 (01-13-25 @ 19:43)  Weight (kg): 51.71 (01-20-25 @ 14:17), 53.5 (01-13-25 @ 19:43), 53 (12-20-24 @ 13:00)  BMI (kg/m2): 19 (01-20-25 @ 14:17), 19.6 (01-14-25 @ 02:37), 19.6 (01-13-25 @ 19:43)    [ ]PPSV2 < or = to 30% [ ]significant weight loss  [ ]poor nutritional intake  [ ]anasarca[ ]Artificial Nutrition      Other REFERRALS:  [ ]Hospice  [ ]Child Life  [ ]Social Work  [ ]Case management [ ]Holistic Therapy     Goals of Care Document:  HPI:   48yo F BRCA pmh Stage II right invasive ductal carcinoma s/p ddACT followed by RT and had been on anastrozole since 2021, extensive osseous and liver mets, seen by palliative care 1/17 prescribed trial of oxycodone, tramadol, and tizanidine without relief.  Pain has been increases daily and unable to get out of bed the past 4 days.  Also unable to eat as she cannot sit upright due to pain.  Has been constipated but had a bowel movement yesterday.  Barely makes it to the bathroom due to pain and uses gloves as she cannot stand long enough due to the pain to wash her hands.  Reports right lower back is the worst and right abdomen feels like a bullet wound.  At best, oxycodone brought pain from 9 to an 8.    In ED: 1L LR, 0.5mg dilaudid @5am, 2mg morphine x 2, zofran (20 Jan 2025 10:49)    GaP team consulted for pain management.     Pt shared she 's been having sharp, non-radiating R lower back pain and RLQ pain for the passed 2 weeks that became severe last Monday. Reports laying on R side improves the pain while sitting, walking, and laying on L side worsens the pain. Shared pain was 9/10 this morning and improved to 3/10 with medication and just laying in bed however when she starts to move the pain worsens. Was rx'd oxy 5mg q4h PRN outpatient and reports taking it about 2 times a day with improvement in pain from 9 to 8.     Pt also shared she has a clear understanding about her breast cancer. She understands she has multiple lesions and will likely die from this metastatic cancer. She shared that because of this clear understanding she feels as though she's been mourning her life and has been crying a lot, going on to describe a lot of existential pain. Shared she has a lot of family and friends who have been supporting her. Chaplaincy and palliative SW services offered and accepted. Emotional support provided.     PERTINENT PM/SXH:   IBS (irritable bowel syndrome)      History of cholecystectomy    History of myomectomy    Status post excision of fibroadenoma of breast      FAMILY HISTORY:  FH: stomach cancer (Mother)      Family Hx substance abuse [ ]yes [ ]no  ITEMS NOT CHECKED ARE NOT PRESENT    SOCIAL HISTORY:   Significant other/partner[ ]  Children[ ]  Restorationism/Spirituality: Episcopal  Substance hx:  [ ]   Tobacco hx:  [ ]   Alcohol hx: [ ]   Home Opioid hx:  [x ] I-Stop Reference No: [id number]  Living Situation: [x ]Home  [ ]Long term care  [ ]Rehab [ ]Other      PDI	Current Rx	Drug Type	Rx Written	Rx Dispensed	Drug	Quantity	Days Supply	Prescriber Name	Prescriber PO #	Payment Method	Dispenser  A	Y	O	01/17/2025	01/17/2025	oxycodone hcl (ir) 5 mg tablet	30	5	Michaelle Morales	[id number]	Insurance	[facility]  B	N	O	01/16/2025	01/16/2025	oxycodone hcl (ir) 5 mg tablet	8	2	Nimisha Albright	[id number]	Insurance	[facility] #[id number]  B	Y	O	01/15/2025	01/15/2025	tramadol hcl 50 mg tablet	21	7	Michaelle Morales	[id number]	Insurance	[facility] #[id number]  B	Y	B	12/16/2024	12/27/2024	alprazolam 2 mg tablet	60	30	Truong, Freddy	[id number]	Insurance	[facility] #[id number]  B	N	B	10/14/2024	11/12/2024	alprazolam 2 mg tablet	60	30	Truong, Freddy	[id number]	Insurance	[facility] #[id number]  B	N	B	09/08/2024	09/09/2024	alprazolam 2 mg tablet	60	30	Truong, Freddy	[id number]	Insurance	[facility] #[id number]  B	N	B	07/31/2024	08/13/2024	alprazolam 1 mg tablet	90	30	Truong, Freddy	[id number]	Insurance	[facility] #[id number]  B	N	B	05/14/2024	05/14/2024	alprazolam 1 mg tablet	90	30	Truong, Freddy	[id number]	Insurance	[facility] #[id number]  B	N	B	03/07/2024	03/10/2024	alprazolam 1 mg tablet	90	30	Truong, Freddy	[id number]	Insurance	[facility] #[id number]  B	N	B	02/16/2024	02/22/2024	alprazolam 0.5 mg tablet	90	30	Truong, Alfonzovipérez	[id number]	Insurance	[facility] #[id number]    ADVANCE DIRECTIVES:    DNR/MOLST  [ ]  Living Will  [ ]   DECISION MAKER(s):  [ ] Health Care Proxy(s)  [ ] Surrogate(s)  [ ] Guardian           Name(s): Phone Number(s):    BASELINE (I)ADL(s) (prior to admission):  [place]: [x ]Total  [ ] Moderate [ ]Dependent    Allergies    Keflex (Other)    Intolerances    MEDICATIONS  (STANDING):  cholecalciferol 2000 Unit(s) Oral daily  enoxaparin Injectable 40 milliGRAM(s) SubCutaneous every 24 hours  lactated ringers. 1000 milliLiter(s) (100 mL/Hr) IV Continuous <Continuous>  melatonin 3 milliGRAM(s) Oral once  polyethylene glycol 3350 17 Gram(s) Oral two times a day  senna 2 Tablet(s) Oral at bedtime  sertraline 100 milliGRAM(s) Oral daily    MEDICATIONS  (PRN):  ALPRAZolam 1 milliGRAM(s) Oral daily PRN anxiety  ALPRAZolam 3 milliGRAM(s) Oral at bedtime PRN anxiety  aluminum hydroxide/magnesium hydroxide/simethicone Suspension 30 milliLiter(s) Oral every 4 hours PRN Dyspepsia  HYDROmorphone  Injectable 0.5 milliGRAM(s) IV Push every 6 hours PRN Severe Pain (7 - 10)  HYDROmorphone  Injectable 0.2 milliGRAM(s) IV Push every 3 hours PRN Moderate Pain (4 - 6)  HYDROmorphone  Injectable 0.5 milliGRAM(s) IV Push Once PRN Severe Pain (7 - 10)  magnesium hydroxide Suspension 30 milliLiter(s) Oral daily PRN Constipation  ondansetron Injectable 4 milliGRAM(s) IV Push every 8 hours PRN Nausea and/or Vomiting  tiZANidine 2 milliGRAM(s) Oral at bedtime PRN Muscle Spasm    PRESENT SYMPTOMS: [ ]Unable to self-report  [ ] CPOT [ ] PAINADs [ ] RDOS  Source if other than patient:  [ ]Family   [ ]Team     Pain: [x ]yes [ ]no  QOL impact - unable to to function/do daily tasks  Location -   R lower back pain and RLQ pain                 Aggravating factors - movement and laying on L side  Quality - sharp  Radiation - non-radiating  Timing- constant  Severity (0-10 scale): 3/10 when lying still, 8/10 when moving  Minimal acceptable level (0-10 scale): 2-3/10    CPOT:    https://www.[facility].org/getattachment/[email or web address]/Critical-Care-Pain-Observation-Tool-(CPOT)    PAIN AD Score:   http://geriatrictoolkit.[facility]/cog/painad.pdf (press ctrl +  left click to view)    Dyspnea:                           [ ]Mild [ ]Moderate [ ]Severe      RDOS:  0 to 2  minimal or no respiratory distress   3  mild distress  4 to 6 moderate distress  >7 severe distress  https://homecareinformation.net/handouts/hen/Respiratory_Distress_Observation_Scale.pdf (Ctrl +  left click to view)     Anxiety:                             [ ]Mild [ ]Moderate [ ]Severe  Fatigue:                             [ ]Mild [ ]Moderate [ ]Severe  Nausea:                             [ ]Mild [ ]Moderate [ ]Severe  Loss of appetite:              [ ]Mild [ ]Moderate [ ]Severe  Constipation:                    [ ]Mild [ ]Moderate [ ]Severe    PCSSQ[Palliative Care Spiritual Screening Question]   Severity (0-10):  Score of 4 or > indicate consideration of Chaplaincy referral.  Chaplaincy Referral: [x ] yes [ ] refused [ ] following [ ] Deferred     Caregiver [place]? : [ ] yes [ ] no [ x] Deferred [ ] Declined             Social work referral [ ] Patient & Family Centered Care Referral [ ]     Anticipatory Grief present?:  [ x] yes [ ] no  [ ] Deferred                  Social work referral [ x] Chaplaincy Referral[x ]      Other Symptoms: constipation   [ x]All other review of systems negative     Palliative Performance Status Version 2:   50      %    http://npcrc.org/files/news/palliative_performance_scale_ppsv2.pdf  PHYSICAL EXAM:  Vital Signs Last 24 Hrs  T(C): 36.5 (20 Jan 2025 20:24), Max: 37.5 (20 Jan 2025 10:49)  T(F): 97.7 (20 Jan 2025 20:24), Max: 99.5 (20 Jan 2025 10:49)  HR: 72 (20 Jan 2025 20:24) (71 - 79)  BP: 115/73 (20 Jan 2025 20:24) (102/63 - 115/73)  BP(mean): 76 (20 Jan 2025 10:49) (76 - 76)  RR: 18 (20 Jan 2025 20:24) (18 - 18)  SpO2: 95% (20 Jan 2025 20:24) (93% - 95%)    Parameters below as of 20 Jan 2025 20:24  Patient On (Oxygen Delivery Method): room air     I&O's Summary    20 Jan 2025 07:01  -  21 Jan 2025 07:00  --------------------------------------------------------  IN: 1100 mL / OUT: 0 mL / NET: 1100 mL      GENERAL: [ ]Cachexia    [x ]Alert  [ x]Oriented x3   [ ]Lethargic  [ ]Unarousable  [ x]Verbal  [ ]Non-Verbal  Behavioral:   [ ] Anxiety  [ ] Delirium [ ] Agitation [ ] Other  HEENT:  [ x]Normal   [ ]Dry mouth   [ ]ET Tube/Trach  [ ]Oral lesions  PULMONARY:   [x ]Clear [ ]Tachypnea  [ ]Audible excessive secretions   [ ]Rhonchi        [ ]Right [ ]Left [ ]Bilateral  [ ]Crackles        [ ]Right [ ]Left [ ]Bilateral  [ ]Wheezing     [ ]Right [ ]Left [ ]Bilateral  [ ]Diminished breath sounds [ ]right [ ]left [ ]bilateral  CARDIOVASCULAR:    [ ]Regular [ ]Irregular [ ]Tachy  [ ]Per [ ]Murmur [ ]Other  GASTROINTESTINAL:  [ x]Soft  [ ]Distended   [ x]+BS  [ x]Non tender [ ]Tender  [ ]Other [ ]PEG [ ]OGT/ NGT  Last BM:  GENITOURINARY:  [x ]Normal [ ] Incontinent   [ ]Oliguria/Anuria   [ ]Marcial  MUSCULOSKELETAL:   [ x]Normal   [ ]Weakness  [ ]Bed/Wheelchair bound [ ]Edema  NEUROLOGIC:   [x ]No focal deficits  [ ]Cognitive impairment  [ ]Dysphagia [ ]Dysarthria [ ]Paresis [ ]Other   SKIN:   [ ]Normal  [ ]Rash  [ ]Other  [ ]Pressure ulcer(s)       Present on admission [ ]y [ ]n    CRITICAL CARE:  [ ] Shock Present  [ ]Septic [ ]Cardiogenic [ ]Neurologic [ ]Hypovolemic  [ ]  Vasopressors [ ]  Inotropes   [ ]Respiratory failure present [ ]Mechanical ventilation [ ]Non-invasive ventilatory support [ ]High flow    [ ]Acute  [ ]Chronic [ ]Hypoxic  [ ]Hypercarbic [ ]Other  [ ]Other organ failure     LABS:                        12.9   8.20  )-----------( 112      ( 20 Jan 2025 03:04 )             39.4   01-20    139  |  100  |  8   ----------------------------<  100[H]  4.0   |  26  |  0.57    Ca    9.1      20 Jan 2025 03:04  Mg     1.9     01-20    TPro  6.8  /  Alb  3.8  /  TBili  1.3[H]  /  DBili  x   /  AST  334[H]  /  ALT  182[H]  /  AlkPhos  230[H]  01-20      Urinalysis Basic - ( 20 Jan 2025 03:04 )    Color: x / Appearance: x / SG: x / pH: x  Gluc: 100 mg/dL / Ketone: x  / Bili: x / Urobili: x   Blood: x / Protein: x / Nitrite: x   Leuk Esterase: x / RBC: x / WBC x   Sq Epi: x / Non Sq Epi: x / Bacteria: x      RADIOLOGY & ADDITIONAL STUDIES:  Sonography of the right upper quadrant  FINDINGS:  Liver: Innumerable indistinct hypodense lesions.  Bile ducts: Normal caliber. Common bile duct measures 5 mm.  Gallbladder: Cholecystectomy.  Pancreas: Visualized portions are within normal limits.  Right kidney: 10.7 cm. No hydronephrosis.  Ascites: None.  IVC: Visualized portions are within normal limits.    IMPRESSION:  1.  No biliary duct dilatation  2.  Diffuse hepatic metastasis    PROTEIN CALORIE MALNUTRITION PRESENT: [ ]mild [ ]moderate [ ]severe [ ]underweight [ ]morbid obesity  https://www.andeal.org/vault/2440/web/files/ONC/Table_Clinical%20Characteristics%20to%20Document%20Malnutrition-White%20JV%20et%20al%202012.pdf    Height (cm): 165.1 (01-20-25 @ 14:17), 165.1 (01-14-25 @ 02:37), 165.1 (01-13-25 @ 19:43)  Weight (kg): 51.71 (01-20-25 @ 14:17), 53.5 (01-13-25 @ 19:43), 53 (12-20-24 @ 13:00)  BMI (kg/m2): 19 (01-20-25 @ 14:17), 19.6 (01-14-25 @ 02:37), 19.6 (01-13-25 @ 19:43)    [ ]PPSV2 < or = to 30% [ ]significant weight loss  [ ]poor nutritional intake  [ ]anasarca[ ]Artificial Nutrition      Other REFERRALS:  [ ]Hospice  [ ]Child Life  [ ]Social Work  [ ]Case management [ ]Holistic Therapy     Goals of Care Document:  HPI:   50yo F BRCA pmh Stage II right invasive ductal carcinoma s/p ddACT followed by RT and had been on anastrozole since 2021, extensive osseous and liver mets, seen by palliative care 1/17 prescribed trial of oxycodone, tramadol, and tizanidine without relief.  Pain has been increases daily and unable to get out of bed the past 4 days.  Also unable to eat as she cannot sit upright due to pain.  Has been constipated but had a bowel movement yesterday.  Barely makes it to the bathroom due to pain and uses gloves as she cannot stand long enough due to the pain to wash her hands.  Reports right lower back is the worst and right abdomen feels like a bullet wound.  At best, oxycodone brought pain from 9 to an 8.    In ED: 1L LR, 0.5mg dilaudid @5am, 2mg morphine x 2, zofran (20 Jan 2025 10:49)    GaP team consulted for pain management.     Pt shared she 's been having sharp, non-radiating R lower back pain and RLQ pain for the passed 2 weeks that became severe last Monday. Reports laying on R side improves the pain while sitting, walking, and laying on L side worsens the pain. Shared pain was 9/10 this morning and improved to 3/10 with medication and just laying in bed however when she starts to move the pain worsens. Was rx'd oxy 5mg q4h PRN outpatient and reports taking it about 2 times a day with improvement in pain from 9 to 8.     Pt also shared she has a clear understanding about her breast cancer. She understands she has multiple lesions and will likely die from this metastatic cancer. She shared that because of this clear understanding she feels as though she's been mourning her life and has been crying a lot, going on to describe a lot of existential pain. Shared she has a lot of family and friends who have been supporting her. Chaplaincy and palliative SW services offered and accepted. Emotional support provided.     PERTINENT PM/SXH:   IBS (irritable bowel syndrome)      History of cholecystectomy    History of myomectomy    Status post excision of fibroadenoma of breast      FAMILY HISTORY:  FH: stomach cancer (Mother)    ITEMS NOT CHECKED ARE NOT PRESENT    SOCIAL HISTORY:   Significant other/partner[ ]  Children[ ]  Zoroastrian/Spirituality: Lutheran  Substance hx:  [ ]   Tobacco hx:  [ ]   Alcohol hx: [ ]   Home Opioid hx:  [x ] I-Stop Reference No: [id number]  Living Situation: [x ]Home  [ ]Long term care  [ ]Rehab [ ]Other      PDI	Current Rx	Drug Type	Rx Written	Rx Dispensed	Drug	Quantity	Days Supply	Prescriber Name	Prescriber PO #	Payment Method	Dispenser  A	Y	O	01/17/2025	01/17/2025	oxycodone hcl (ir) 5 mg tablet	30	5	Karrie Moralesfany	[id number]	Insurance	[facility]  B	N	O	01/16/2025	01/16/2025	oxycodone hcl (ir) 5 mg tablet	8	2	Nimisha Albright	[id number]	Insurance	[id number]  B	Y	O	01/15/2025	01/15/2025	tramadol hcl 50 mg tablet	21	7	AndrewMichaelle	[id number]	Insurance	[facility] #[id number]  B	Y	B	12/16/2024	12/27/2024	alprazolam 2 mg tablet	60	30	Truong, Freddy	[id number]	Insurance	[facility] #[id number]  B	N	B	10/14/2024	11/12/2024	alprazolam 2 mg tablet	60	30	Truong, Freddy	[id number]	Insurance	[facility] #[id number]  B	N	B	09/08/2024	09/09/2024	alprazolam 2 mg tablet	60	30	Truong, Freddy	[id number]	Insurance	[facility] #[id number]  B	N	B	07/31/2024	08/13/2024	alprazolam 1 mg tablet	90	30	Truong, Freddy	[id number]	Insurance	[facility] #[id number]  B	N	B	05/14/2024	05/14/2024	alprazolam 1 mg tablet	90	30	Truong, Freddy	[id number]	Insurance	[facility] #[id number]  B	N	B	03/07/2024	03/10/2024	alprazolam 1 mg tablet	90	30	Truong, Freddy	[id number]	Insurance	[facility] #[id number]  B	N	B	02/16/2024	02/22/2024	alprazolam 0.5 mg tablet	90	30	Truong, Alfonzovipérez	[id number]	Insurance	[facility] #[id number]    ADVANCE DIRECTIVES:    DNR/MOLST  [ ]  Living Will  [ ]   DECISION MAKER(s): Self  [ ] Health Care Proxy(s)  [ ] Surrogate(s)  [ ] Guardian           Name(s): Phone Number(s):    BASELINE (I)ADL(s) (prior to admission):  [place]: [x ]Total  [ ] Moderate [ ]Dependent    Allergies    Keflex (Other)    Intolerances    MEDICATIONS  (STANDING):  cholecalciferol 2000 Unit(s) Oral daily  enoxaparin Injectable 40 milliGRAM(s) SubCutaneous every 24 hours  lactated ringers. 1000 milliLiter(s) (100 mL/Hr) IV Continuous <Continuous>  melatonin 3 milliGRAM(s) Oral once  polyethylene glycol 3350 17 Gram(s) Oral two times a day  senna 2 Tablet(s) Oral at bedtime  sertraline 100 milliGRAM(s) Oral daily    MEDICATIONS  (PRN):  ALPRAZolam 1 milliGRAM(s) Oral daily PRN anxiety  ALPRAZolam 3 milliGRAM(s) Oral at bedtime PRN anxiety  aluminum hydroxide/magnesium hydroxide/simethicone Suspension 30 milliLiter(s) Oral every 4 hours PRN Dyspepsia  HYDROmorphone  Injectable 0.5 milliGRAM(s) IV Push every 6 hours PRN Severe Pain (7 - 10)  HYDROmorphone  Injectable 0.2 milliGRAM(s) IV Push every 3 hours PRN Moderate Pain (4 - 6)  HYDROmorphone  Injectable 0.5 milliGRAM(s) IV Push Once PRN Severe Pain (7 - 10)  magnesium hydroxide Suspension 30 milliLiter(s) Oral daily PRN Constipation  ondansetron Injectable 4 milliGRAM(s) IV Push every 8 hours PRN Nausea and/or Vomiting  tiZANidine 2 milliGRAM(s) Oral at bedtime PRN Muscle Spasm    PRESENT SYMPTOMS: [ ]Unable to self-report  [ ] CPOT [ ] PAINADs [ ] RDOS  Source if other than patient:  [ ]Family   [ ]Team     Pain: [x ]yes [ ]no  QOL impact - unable to to function/do daily tasks  Location -   R lower back pain and RLQ pain                 Aggravating factors - movement and laying on L side  Quality - sharp  Radiation - non-radiating  Timing- constant  Severity (0-10 scale): 3/10 when lying still, 8/10 when moving  Minimal acceptable level (0-10 scale): 2-3/10    CPOT:    https://www.[email or web address]/getattachment/[email or web address]/Critical-Care-Pain-Observation-Tool-(CPOT)    PAIN AD Score:   http://geriatrictoolkit.[facility]/cog/painad.pdf (press ctrl +  left click to view)    Dyspnea:              No             [ ]Mild [ ]Moderate [ ]Severe      RDOS:  0 to 2  minimal or no respiratory distress   3  mild distress  4 to 6 moderate distress  >7 severe distress  https://[facility]areinformation.net/handouts/hen/Respiratory_Distress_Observation_Scale.pdf (Ctrl +  left click to view)     Anxiety:                             [ ]Mild [ ]Moderate [ ]Severe  Fatigue:                             [ ]Mild [ ]Moderate [ ]Severe  Nausea:                             [ ]Mild [ ]Moderate [ ]Severe  Loss of appetite:              [ ]Mild [ ]Moderate [ ]Severe  Constipation:                    [ ]Mild [ ]Moderate [ ]Severe    PCSSQ[Palliative Care Spiritual Screening Question]   Severity (0-10):  Score of 4 or > indicate consideration of Chaplaincy referral.  Chaplaincy Referral: [x ] yes [ ] refused [ ] following [ ] Deferred     Caregiver [place]? : [ ] yes [ ] no [ x] Deferred [ ] Declined             Social work referral [ ] Patient & Family Centered Care Referral [ ]     Anticipatory Grief present?:  [ x] yes [ ] no  [ ] Deferred                  Social work referral [ x] Chaplaincy Referral[x ]      Other Symptoms: constipation   [ x]All other review of systems negative     Palliative Performance Status Version 2:   50      %    http://npcrc.org/files/news/palliative_performance_scale_ppsv2.pdf  PHYSICAL EXAM:  Vital Signs Last 24 Hrs  T(C): 36.5 (20 Jan 2025 20:24), Max: 37.5 (20 Jan 2025 10:49)  T(F): 97.7 (20 Jan 2025 20:24), Max: 99.5 (20 Jan 2025 10:49)  HR: 72 (20 Jan 2025 20:24) (71 - 79)  BP: 115/73 (20 Jan 2025 20:24) (102/63 - 115/73)  BP(mean): 76 (20 Jan 2025 10:49) (76 - 76)  RR: 18 (20 Jan 2025 20:24) (18 - 18)  SpO2: 95% (20 Jan 2025 20:24) (93% - 95%)    Parameters below as of 20 Jan 2025 20:24  Patient On (Oxygen Delivery Method): room air     I&O's Summary    20 Jan 2025 07:01  -  21 Jan 2025 07:00  --------------------------------------------------------  IN: 1100 mL / OUT: 0 mL / NET: 1100 mL      GENERAL: [ ]Cachexia    [x ]Alert  [ x]Oriented x3   [ ]Lethargic  [ ]Unarousable  [ x]Verbal  [ ]Non-Verbal  Behavioral:   [ ] Anxiety  [ ] Delirium [ ] Agitation [ ] Other  HEENT:  [ x]Normal   [ ]Dry mouth   [ ]ET Tube/Trach  [ ]Oral lesions  PULMONARY:   [x ]Clear [ ]Tachypnea  [ ]Audible excessive secretions   [ ]Rhonchi        [ ]Right [ ]Left [ ]Bilateral  [ ]Crackles        [ ]Right [ ]Left [ ]Bilateral  [ ]Wheezing     [ ]Right [ ]Left [ ]Bilateral  [ ]Diminished breath sounds [ ]right [ ]left [ ]bilateral  CARDIOVASCULAR:    [ ]Regular [ ]Irregular [ ]Tachy  [ ]Per [ ]Murmur [ ]Other  GASTROINTESTINAL:  [ x]Soft  [ ]Distended   [ x]+BS  [ x]Non tender [ ]Tender  [ ]Other [ ]PEG [ ]OGT/ NGT  Last BM:  GENITOURINARY:  [x ]Normal [ ] Incontinent   [ ]Oliguria/Anuria   [ ]Marcial  MUSCULOSKELETAL:   [ x]Normal   [ ]Weakness  [ ]Bed/Wheelchair bound [ ]Edema  NEUROLOGIC:   [x ]No focal deficits  [ ]Cognitive impairment  [ ]Dysphagia [ ]Dysarthria [ ]Paresis [ ]Other   SKIN:   [ ]Normal  [ ]Rash  [ ]Other  [ ]Pressure ulcer(s)       Present on admission [ ]y [ ]n    CRITICAL CARE:  [ ] Shock Present  [ ]Septic [ ]Cardiogenic [ ]Neurologic [ ]Hypovolemic  [ ]  Vasopressors [ ]  Inotropes   [ ]Respiratory failure present [ ]Mechanical ventilation [ ]Non-invasive ventilatory support [ ]High flow    [ ]Acute  [ ]Chronic [ ]Hypoxic  [ ]Hypercarbic [ ]Other  [ ]Other organ failure     LABS:                        12.9   8.20  )-----------( 112      ( 20 Jan 2025 03:04 )             39.4   01-20    139  |  100  |  8   ----------------------------<  100[H]  4.0   |  26  |  0.57    Ca    9.1      20 Jan 2025 03:04  Mg     1.9     01-20    TPro  6.8  /  Alb  3.8  /  TBili  1.3[H]  /  DBili  x   /  AST  334[H]  /  ALT  182[H]  /  AlkPhos  230[H]  01-20      Urinalysis Basic - ( 20 Jan 2025 03:04 )    Color: x / Appearance: x / SG: x / pH: x  Gluc: 100 mg/dL / Ketone: x  / Bili: x / Urobili: x   Blood: x / Protein: x / Nitrite: x   Leuk Esterase: x / RBC: x / WBC x   Sq Epi: x / Non Sq Epi: x / Bacteria: x      RADIOLOGY & ADDITIONAL STUDIES:  Sonography of the right upper quadrant  FINDINGS:  Liver: Innumerable indistinct hypodense lesions.  Bile ducts: Normal caliber. Common bile duct measures 5 mm.  Gallbladder: Cholecystectomy.  Pancreas: Visualized portions are within normal limits.  Right kidney: 10.7 cm. No hydronephrosis.  Ascites: None.  IVC: Visualized portions are within normal limits.    IMPRESSION:  1.  No biliary duct dilatation  2.  Diffuse hepatic metastasis    PROTEIN CALORIE MALNUTRITION PRESENT: [ ]mild [ ]moderate [ ]severe [ ]underweight [ ]morbid obesity  https://www.andeal.org/vault/2440/web/files/ONC/Table_Clinical%20Characteristics%20to%20Document%20Malnutrition-White%20JV%20et%20al%202012.pdf    Height (cm): 165.1 (01-20-25 @ 14:17), 165.1 (01-14-25 @ 02:37), 165.1 (01-13-25 @ 19:43)  Weight (kg): 51.71 (01-20-25 @ 14:17), 53.5 (01-13-25 @ 19:43), 53 (12-20-24 @ 13:00)  BMI (kg/m2): 19 (01-20-25 @ 14:17), 19.6 (01-14-25 @ 02:37), 19.6 (01-13-25 @ 19:43)    [ ]PPSV2 < or = to 30% [ ]significant weight loss  [ ]poor nutritional intake  [ ]anasarca[ ]Artificial Nutrition      Other REFERRALS:  [ ]Hospice  [ ]Child Life  [ ]Social Work  [ ]Case management [ ]Holistic Therapy     Goals of Care Document:

## 2025-01-21 NOTE — CONSULT NOTE ADULT - PROBLEM SELECTOR RECOMMENDATION 4
Pt's goals are to get pain controlled enough to be able to f/u outpatient with onc for continued treatment  - discussed completing HCP and to think about who she would like to name. Will address at next assessment.

## 2025-01-21 NOTE — CONSULT NOTE ADULT - ASSESSMENT
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 GaP team consulted for pain management.

## 2025-01-21 NOTE — CONSULT NOTE ADULT - CONVERSATION DETAILS
Goal is to be able to go home and do daily tasks with minimal pain in order to f/u with onc outpatient for continued treatment.    Also discussed completing HCP and to think about who she would like to name. Will address at next assessment.

## 2025-01-21 NOTE — CONSULT NOTE ADULT - PROBLEM SELECTOR RECOMMENDATION 2
Pt reporting last BM 5 days ago  - on miralax BID and senna 2 tab qhs  - recommend adding dulcolax and considering enema is pt continues without BM Pt reporting last BM 5 days ago  - on miralax BID and senna 2 tab qhs  - recommend adding dulcolax and considering enema if pt continues without BM

## 2025-01-21 NOTE — PROGRESS NOTE ADULT - PROBLEM SELECTOR PLAN 4
dehydrated from poor po   LR x 24 hours  lovenox for elevated improve score with active malignancy  anticipate transfer to [facility] if plans for RT, although she may also be appropriate for OP f/u

## 2025-01-21 NOTE — PROGRESS NOTE ADULT - PROBLEM SELECTOR PLAN 1
intractable.  Anticipate fentanyl patch for basal control  for now start dilaudid 0.5mg iv q6 for severe pain with 0.2mg iv q3 for breakthrough  agreeable to trialing tizanidine at night as it did help her sleep though caused daytime dizziness  xanax as prescribed at home 1mg qam, 3mg qhs  palliative consult placed- anticipate transition to PO meds soon  emailed rad onc for consult- no clear localized target for therapy, will f/u MR L/S spine, recommend systemic palliative chemo  med onc consulted- rec f/u MR L/S spine to further delineate spinal lesions

## 2025-01-21 NOTE — PROGRESS NOTE ADULT - ASSESSMENT
48 y/o BRCA negative  F who had Stage II right invasive ductal carcinoma s/p ddACT followed by RT and had been on anastrozole since 2021. She had POD on 1/2024 and R iliac bone biopsy that is consistent with metastatic breast cancer to the bones and ER positive, [place] negative, Her2 negative. She was on Faslodex and Truqap, however recent PET/CT 12/29/2024 which showed progression of disease. Palliative treatment options were reviewed and she was subsequently prescribed capecitabine: she has not started yet due to concerns about side effects and ongoing back pain. Pt initially went to [facility] ED 1/14/25 endorsing same issue, had a CT scan performed (which showed diffuse osseous mets and innumerable hypodense ill-defined hepatic lesions which measure up  to 1.4 cm in the right hepatic lobe, concerning for metastases)., was not assessed by Radiation Oncology to better control of pain for palliative purposes, received morphine which controlled the pain and was sent home.  Pt now admitted to [facility] for intractable back pain for last few days. Oncology consulted for recommendations.      # Metastatic breast cancer  # Lower back pain, cancer related  *MRI Thoracic spine done outpatient on 1/17/25 shows- Partially treated diffuse osseous metastases. Largest residual enhancing osteolytic metastasis within the field of view arising within the posterior spinous processes T5.  - Appreciate palliative care recommendations for pain control. Would keep patient inpatient until pain is controlled to 2-3 out of 10. Patient states she has not been able to function at home for the past week due to "piercing" pain in her back and RUQ.  - Obtain MRI Lumbar spine under anesthesia (Pt could not get MRI lumbar spine outpatient due to pain and was able to complete only MRI thoracic spine)  - Appreciate final Radiation oncology recommendations once MRI spine is complete. After review of CT spine, no plans for RT given extent of disease.  - Patient started capecitabine before hospital admission. This will hopefully help control the pain but could take 4-6 weeks in the best circumstance.   - daily CBC w diff, CMP  - will discuss with outpatient oncology team regarding DMT  - Will follow with Dr. Obando at [facility] upon hospital discharge    NOTE INCOMPLETE UNTIL ATTENDING SIGNS    ***************************************************************  Cheyenne Bonilla, PGY5  Fellow Hematology/Oncology  pager: [phone]   Available on Microsoft Teams  After 5pm or on weekends please contact  to page on-call fellow   ***************************************************************      48 y/o BRCA negative  F who had Stage II right invasive ductal carcinoma s/p ddACT followed by RT and had been on anastrozole since 2021. She had POD on 1/2024 and R iliac bone biopsy that is consistent with metastatic breast cancer to the bones and ER positive, [place] negative, Her2 negative. She was on Faslodex and Truqap, however recent PET/CT 12/29/2024 which showed progression of disease. Palliative treatment options were reviewed and she was subsequently prescribed capecitabine: she has not started yet due to concerns about side effects and ongoing back pain. Pt initially went to [facility] ED 1/14/25 endorsing same issue, had a CT scan performed (which showed diffuse osseous mets and innumerable hypodense ill-defined hepatic lesions which measure up  to 1.4 cm in the right hepatic lobe, concerning for metastases)., was not assessed by Radiation Oncology to better control of pain for palliative purposes, received morphine which controlled the pain and was sent home.  Pt now admitted to [facility] for intractable back pain for last few days. Oncology consulted for recommendations.      # Metastatic breast cancer  # Lower back pain, cancer related  *MRI Thoracic spine done outpatient on 1/17/25 shows- Partially treated diffuse osseous metastases. Largest residual enhancing osteolytic metastasis within the field of view arising within the posterior spinous processes T5.  - Appreciate palliative care recommendations for pain control. Would keep patient inpatient until pain is controlled to 2-3 out of 10. Patient states she has not been able to function at home for the past week due to "piercing" pain in her back and RUQ. Consider dexamethasone for pain control, appreciate palliative recommendations.  - Obtain MRI Lumbar spine under anesthesia (Pt could not get MRI lumbar spine outpatient due to pain and was able to complete only MRI thoracic spine)  - Appreciate final Radiation oncology recommendations once MRI spine is complete. After review of CT spine, no plans for RT given extent of disease.  - Patient started capecitabine before hospital admission. This will hopefully help control the pain but could take 4-6 weeks in the best circumstance.   - daily CBC w diff, CMP  - will discuss with outpatient oncology team regarding DMT  - Will follow with Dr. Obando at [facility] upon hospital discharge    NOTE INCOMPLETE UNTIL ATTENDING SIGNS    ***************************************************************  Cheyenne Bonilla, PGY5  Fellow Hematology/Oncology  pager: [phone]   Available on [employer] Teams  After 5pm or on weekends please contact  to page on-call fellow   ***************************************************************

## 2025-01-21 NOTE — CONSULT NOTE ADULT - PROBLEM SELECTOR RECOMMENDATION 9
Follows with Dr. Booker outpatient   - MRI Thoracic spine done outpatient on 1/17/25 shows- Partially treated diffuse osseous metastases. Largest residual enhancing osteolytic metastasis within the field of view arising within the posterior spinous processes T5.  - on IV dilaudid 0.2mg q3h PRN MP (no PRNs required in last 24hr 7am-7am)  - on IV dilaudid 0.5mg q6h PRN SP (4 PRNs required in last 24hr 7am-7am)  - recommend:  > dc current IV dilaudid PRNs  > start oxy 5mg q4h PRN MP  > start oxy 10mg q4h PRN SP  > start IV dilaudid 0.5mg q4h PRN breakthrough pain   - would dc tizanidine, pt shared she does not like taking it because it makes her very dizzy fro several hours  - narcan PRN  - Bowel regimen while on opioids. Monitor for constipation Follows with Dr. Booker outpatient   - MRI Thoracic spine done outpatient on 1/17/25 shows- Partially treated diffuse osseous metastases. Largest residual enhancing osteolytic metastasis within the field of view arising within the posterior spinous processes T5.  - on IV dilaudid 0.2mg q3h PRN MP (no PRNs required in last 24hr 7am-7am)  - on IV dilaudid 0.5mg q6h PRN SP (4 PRNs required in last 24hr 7am-7am)  - recommend:  > dc current IV dilaudid PRNs  > start oxy 5mg q4h PRN MP  > start oxy 10mg q4h PRN SP  > start IV dilaudid 0.8mg q4h PRN breakthrough pain   - would dc tizanidine, pt shared she does not like taking it because it makes her very dizzy fro several hours  - narcan PRN  - Bowel regimen while on opioids. Monitor for constipation

## 2025-01-21 NOTE — PROGRESS NOTE ADULT - SUBJECTIVE AND OBJECTIVE BOX
INTERVAL HPI/OVERNIGHT EVENTS:  No overnight events.     MEDICATIONS  (STANDING):  cholecalciferol 2000 Unit(s) Oral daily  enoxaparin Injectable 40 milliGRAM(s) SubCutaneous every 24 hours  lactated ringers. 1000 milliLiter(s) (100 mL/Hr) IV Continuous <Continuous>  melatonin 3 milliGRAM(s) Oral once  polyethylene glycol 3350 17 Gram(s) Oral two times a day  senna 2 Tablet(s) Oral at bedtime  sertraline 100 milliGRAM(s) Oral daily    MEDICATIONS  (PRN):  ALPRAZolam 1 milliGRAM(s) Oral daily PRN anxiety  ALPRAZolam 3 milliGRAM(s) Oral at bedtime PRN anxiety  aluminum hydroxide/magnesium hydroxide/simethicone Suspension 30 milliLiter(s) Oral every 4 hours PRN Dyspepsia  HYDROmorphone  Injectable 0.8 milliGRAM(s) IV Push every 4 hours PRN breakthrough pain  magnesium hydroxide Suspension 30 milliLiter(s) Oral daily PRN Constipation  naloxone Injectable 0.1 milliGRAM(s) IV Push every 3 minutes PRN overdose  ondansetron Injectable 4 milliGRAM(s) IV Push every 8 hours PRN Nausea and/or Vomiting  oxyCODONE    IR 5 milliGRAM(s) Oral every 4 hours PRN Moderate Pain (4 - 6)  oxyCODONE    IR 10 milliGRAM(s) Oral every 4 hours PRN Severe Pain (7 - 10)  tiZANidine 2 milliGRAM(s) Oral at bedtime PRN Muscle Spasm    Allergies    Keflex (Other)    Intolerances          VITAL SIGNS:  T(F): 98 (01-21-25 @ 11:41)  HR: 69 (01-21-25 @ 11:41)  BP: 115/69 (01-21-25 @ 11:41)  RR: 18 (01-21-25 @ 11:41)  SpO2: 92% (01-21-25 @ 11:41)  Wt(kg): --    PHYSICAL EXAM:    Constitutional: NAD, lying comfortably in bed, + dry mouth  Eyes: EOMI, PERRLA  Neck: supple, no masses, no JVD  Respiratory: CTAB; no r/r/w  Cardiovascular: RRR, no M/R/G  Gastrointestinal: +BS, soft, NTND, no hepatosplenomegaly  Extremities: no c/c/e  Neurological: AAOx3, nonfocal    LABS:                        12.3   8.49  )-----------( 96       ( 21 Jan 2025 11:47 )             37.7     01-21    139  |  102  |  10  ----------------------------<  78  4.4   |  26  |  0.52    Ca    8.8      21 Jan 2025 11:47  Phos  2.1     01-21  Mg     1.9     01-21    TPro  6.0  /  Alb  3.3  /  TBili  1.8[H]  /  DBili  x   /  AST  300[H]  /  ALT  145[H]  /  AlkPhos  218[H]  01-21      Urinalysis Basic - ( 21 Jan 2025 11:47 )    Color: x / Appearance: x / SG: x / pH: x  Gluc: 78 mg/dL / Ketone: x  / Bili: x / Urobili: x   Blood: x / Protein: x / Nitrite: x   Leuk Esterase: x / RBC: x / WBC x   Sq Epi: x / Non Sq Epi: x / Bacteria: x        RADIOLOGY & ADDITIONAL TESTS:  Studies reviewed.

## 2025-01-21 NOTE — PROGRESS NOTE ADULT - SUBJECTIVE AND OBJECTIVE BOX
Nadir Mcintosh MD  Division of Hospital Medicine  Available on MS teams until 7pm  If no response or off-hours, page [phone]  -------------------------------------    Patient is a 49y old  Female who presents with a chief complaint of intractable pain (21 Jan 2025 09:51)      SUBJECTIVE / OVERNIGHT EVENTS: none acute  ADDITIONAL REVIEW OF SYSTEMS: pt notes modest improvement in pain with IV dilaudid. No other new complaints.     MEDICATIONS  (STANDING):  cholecalciferol 2000 Unit(s) Oral daily  enoxaparin Injectable 40 milliGRAM(s) SubCutaneous every 24 hours  lactated ringers. 1000 milliLiter(s) (100 mL/Hr) IV Continuous <Continuous>  melatonin 3 milliGRAM(s) Oral once  polyethylene glycol 3350 17 Gram(s) Oral two times a day  senna 2 Tablet(s) Oral at bedtime  sertraline 100 milliGRAM(s) Oral daily    MEDICATIONS  (PRN):  ALPRAZolam 1 milliGRAM(s) Oral daily PRN anxiety  ALPRAZolam 3 milliGRAM(s) Oral at bedtime PRN anxiety  aluminum hydroxide/magnesium hydroxide/simethicone Suspension 30 milliLiter(s) Oral every 4 hours PRN Dyspepsia  HYDROmorphone  Injectable 0.8 milliGRAM(s) IV Push every 4 hours PRN breakthrough pain  magnesium hydroxide Suspension 30 milliLiter(s) Oral daily PRN Constipation  naloxone Injectable 0.1 milliGRAM(s) IV Push every 3 minutes PRN overdose  ondansetron Injectable 4 milliGRAM(s) IV Push every 8 hours PRN Nausea and/or Vomiting  oxyCODONE    IR 5 milliGRAM(s) Oral every 4 hours PRN Moderate Pain (4 - 6)  oxyCODONE    IR 10 milliGRAM(s) Oral every 4 hours PRN Severe Pain (7 - 10)  tiZANidine 2 milliGRAM(s) Oral at bedtime PRN Muscle Spasm      CAPILLARY BLOOD GLUCOSE        I&O's Summary    20 Jan 2025 07:01  -  21 Jan 2025 07:00  --------------------------------------------------------  IN: 1100 mL / OUT: 0 mL / NET: 1100 mL        PHYSICAL EXAM:  Vital Signs Last 24 Hrs  T(C): 36.7 (21 Jan 2025 11:41), Max: 36.7 (21 Jan 2025 11:41)  T(F): 98 (21 Jan 2025 11:41), Max: 98 (21 Jan 2025 11:41)  HR: 69 (21 Jan 2025 11:41) (69 - 72)  BP: 115/69 (21 Jan 2025 11:41) (115/69 - 115/73)  BP(mean): --  RR: 18 (21 Jan 2025 11:41) (18 - 18)  SpO2: 92% (21 Jan 2025 11:41) (92% - 95%)    Parameters below as of 21 Jan 2025 11:41  Patient On (Oxygen Delivery Method): room air      CONSTITUTIONAL: NAD  EYES: PERRLA; conjunctiva and sclera clear  ENMT: MMM  NECK: Supple  RESPIRATORY: Normal respiratory effort; CTAB  CARDIOVASCULAR: RRR, no JVD, no peripheral edema   ABDOMEN: Nontender to palpation, normoactive BS, no guarding/rigidity  MUSCLOSKELETAL:  no clubbing/cyanosis, no joint swelling or tenderness to palpation  PSYCH: A+O x 3, affect normal  NEUROLOGY: CN 2-12 are intact and symmetric; no gross sensory or motor deficits  SKIN: No rashes; no palpable lesions    LABS:                        12.3   8.49  )-----------( 96       ( 21 Jan 2025 11:47 )             37.7     01-21    139  |  102  |  10  ----------------------------<  78  4.4   |  26  |  0.52    Ca    8.8      21 Jan 2025 11:47  Phos  2.1     01-21  Mg     1.9     01-21    TPro  6.0  /  Alb  3.3  /  TBili  1.8[H]  /  DBili  x   /  AST  300[H]  /  ALT  145[H]  /  AlkPhos  218[H]  01-21          Urinalysis Basic - ( 21 Jan 2025 11:47 )    Color: x / Appearance: x / SG: x / pH: x  Gluc: 78 mg/dL / Ketone: x  / Bili: x / Urobili: x   Blood: x / Protein: x / Nitrite: x   Leuk Esterase: x / RBC: x / WBC x   Sq Epi: x / Non Sq Epi: x / Bacteria: x          RADIOLOGY & ADDITIONAL TESTS:  Results Reviewed:   Imaging Personally Reviewed:  Electrocardiogram Personally Reviewed:    COORDINATION OF CARE:  Care Discussed with Consultants/Other Providers [Y/N]:  Prior or Outpatient Records Reviewed [Y/N]:

## 2025-01-21 NOTE — CONSULT NOTE ADULT - PROBLEM SELECTOR RECOMMENDATION 3
Follows with Dr. Obando at [facility]   - onc recommendations appreciated  - rad onc recommendations appreciated

## 2025-01-21 NOTE — PROGRESS NOTE ADULT - PROBLEM SELECTOR PLAN 5
due to known liver mets  trend LFTS    dispo: pending pain control, MR L/S spine, med onc treatment plan, PT eval, anticipate 2-3 days.  plan d/w and agreed on by pt, mother and sister at bedside

## 2025-01-22 NOTE — PROGRESS NOTE ADULT - ASSESSMENT
48yo F BRCA pmh Stage II right invasive ductal carcinoma s/p ddACT followed by RT and had been on anastrozole since 2021, extensive osseous and liver mets, seen by palliative care 1/17 prescribed trial of oxycodone, tramadol, and tizanidine without relief admitted 1/19 with intractable cancer pain pending rad onc and palliative eval.

## 2025-01-22 NOTE — PROGRESS NOTE ADULT - PROBLEM SELECTOR PLAN 4
Pt's goals are to get pain controlled enough to be able to f/u outpatient with onc for continued treatment  - discussed completing HCP and to think about who she would like to name. Provided pt with HCP paperwork to complete at her convenience

## 2025-01-22 NOTE — PROGRESS NOTE ADULT - PROBLEM SELECTOR PLAN 5
due to known liver mets  trend LFTS    dispo: pending pain control, MR L/S spine, med onc treatment plan, PT eval, anticipate 2-3 days.  plan d/w and agreed on by pt, sister at bedside

## 2025-01-22 NOTE — DIETITIAN INITIAL EVALUATION ADULT - REASON INDICATOR FOR ASSESSMENT
Nutrition Consult for MST score 2 or > (decreased appetite & unintentional wt loss).   Source: Pt, Electronic Medical Record.   Chart reviewed, events noted.

## 2025-01-22 NOTE — DIETITIAN INITIAL EVALUATION ADULT - ADD RECOMMEND
1) Continue Kosher diet free of therapeutic restrictions as tolerated.   	- Defer texture/consistency to SLP/team.   2) Recommend Multivitamin daily pending no medical contraindications.  3) Continue to monitor PO intake, weight, labs, skin, GI status, and diet.  4) Malnutrition sticker placed in chart.

## 2025-01-22 NOTE — DIETITIAN INITIAL EVALUATION ADULT - ETIOLOGY
Increased physiological demand for nutrients  decreased ability to consume adequate protein-energy in setting of increased nutrient needs, persistent pain

## 2025-01-22 NOTE — PHYSICAL THERAPY INITIAL EVALUATION ADULT - SIT-TO-STAND BALANCE
Faxed lab bill to [facility] with code attached.     Called pt to advise left detailed message and to call back if further questions.   
I reviewed the billed labs, please resubmit    
EDEMA/PAIN
fair plus

## 2025-01-22 NOTE — DIETITIAN INITIAL EVALUATION ADULT - PERTINENT MEDS FT
MEDICATIONS  (STANDING):  ALPRAZolam 2 milliGRAM(s) Oral at bedtime  cholecalciferol 2000 Unit(s) Oral daily  enoxaparin Injectable 40 milliGRAM(s) SubCutaneous every 24 hours  lactated ringers. 1000 milliLiter(s) (100 mL/Hr) IV Continuous <Continuous>  melatonin 3 milliGRAM(s) Oral once  polyethylene glycol 3350 17 Gram(s) Oral two times a day  senna 2 Tablet(s) Oral at bedtime  sertraline 100 milliGRAM(s) Oral daily    MEDICATIONS  (PRN):  ALPRAZolam 1 milliGRAM(s) Oral daily PRN anxiety  aluminum hydroxide/magnesium hydroxide/simethicone Suspension 30 milliLiter(s) Oral every 4 hours PRN Dyspepsia  HYDROmorphone  Injectable 0.8 milliGRAM(s) IV Push every 4 hours PRN breakthrough pain  magnesium hydroxide Suspension 30 milliLiter(s) Oral daily PRN Constipation  naloxone Injectable 0.1 milliGRAM(s) IV Push every 3 minutes PRN overdose  ondansetron Injectable 4 milliGRAM(s) IV Push every 8 hours PRN Nausea and/or Vomiting  oxyCODONE    IR 5 milliGRAM(s) Oral every 4 hours PRN Moderate Pain (4 - 6)  oxyCODONE    IR 10 milliGRAM(s) Oral every 4 hours PRN Severe Pain (7 - 10)  tiZANidine 2 milliGRAM(s) Oral at bedtime PRN Muscle Spasm

## 2025-01-22 NOTE — DIETITIAN INITIAL EVALUATION ADULT - ENERGY INTAKE
Poor (<50%) - In house, pt reports continued poor appetite and PO intake. No PO intake information available per flowsheets at this time. Pt reports only consuming food brought from home per preference to avoid issues with food sensitivities.   - Pt currently NPO pending MRI.

## 2025-01-22 NOTE — PHYSICAL THERAPY INITIAL EVALUATION ADULT - ADDITIONAL COMMENTS
Pt resides in apartment, alone, (+)elevator, PTA ambulatory without AD, owns rollator, shower chair, Pt states that she will be staying with family upon DC home.

## 2025-01-22 NOTE — PROGRESS NOTE ADULT - PROBLEM SELECTOR PLAN 1
Follows with Dr. Booker outpatient. Reporting minimal improvement in pain. Suspect component of existential pain  - MRI Thoracic spine done outpatient on 1/17/25 shows- Partially treated diffuse osseous metastases. Largest residual enhancing osteolytic metastasis within the field of view arising within the posterior spinous processes T5.  - increase oxy 5mg q4h PRN MP (no PRNs required in last 24hr 7am-7am) to oxy 10mg q4h PRN MP  - increase oxy 10mg q4h PRN SP (1 PRN required in last 24hr 7am-7am) to oxy 15mg q4h PRN SP  - c/w IV dilaudid 0.8mg q4h PRN breakthrough pain (2 PRNs required in last 24hr 7am-7am)   - would dc tizanidine, pt shared she does not like taking it because it makes her very dizzy for several hours  - narcan PRN  - Bowel regimen while on opioids. Monitor for constipation.  - chaplaincy and SW referrals made

## 2025-01-22 NOTE — PHYSICAL THERAPY INITIAL EVALUATION ADULT - PERTINENT HX OF CURRENT PROBLEM, REHAB EVAL
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  Pain has been increases daily and unable to get out of bed the past 4 days.  Also unable to eat as she cannot sit upright due to pain.  Has been constipated but had a bowel movement yesterday.  Barely makes it to the bathroom due to pain and uses gloves as she cannot stand long enough due to the pain to wash her hands.  Reports right lower back is the worst and right abdomen feels like a bullet wound.  At best, oxycodone brought pain from 9 to an 8.    In ED: 1L LR, 0.5mg dilaudid @5am, 2mg morphine x 2, zofran      US ABDOMEN: No biliary duct dilatation. Diffuse hepatic metastasis.

## 2025-01-22 NOTE — DIETITIAN INITIAL EVALUATION ADULT - ORAL INTAKE PTA/DIET HISTORY
Pt reports having a decreased appetite and PO intake x 3 days PTA in setting of increased pain and decreased ability to "sit upright" to eat, resulting in acid reflux and discomfort. Follows Kosher diet. Pt denies any known food allergies or intolerances, but reports she has many food sensitivities (does not specify), reports she only consumes food brought from home. Reports drinking Orgain oral nutrition supplements previously. Pt taking vitamin D at home per outpatient medications list. Denies any difficulty chewing/swallowing at baseline, however pt notes extreme dry mouth in-house secondary to medications.

## 2025-01-22 NOTE — DIETITIAN INITIAL EVALUATION ADULT - SIGNS/SYMPTOMS
metastatic breast CA BMI <19, meeting <50% EER x 5 days, <10% wt loss x 6 months, moderate-severe muscle/fat loss

## 2025-01-22 NOTE — PROGRESS NOTE ADULT - PROBLEM SELECTOR PLAN 1
intractable.  Anticipate fentanyl patch for basal control  dilaudid dosing per palliative  agreeable to trialing robaxin instead of tizanidine  xanax as prescribed at home 1mg qam, 3mg qhs  palliative consult placed- anticipate transition to PO meds soon  emailed rad onc for consult- no clear localized target for therapy, will f/u MR L/S spine, recommend systemic palliative chemo  med onc consulted- rec f/u MR L/S spine to further delineate spinal lesions

## 2025-01-22 NOTE — DIETITIAN NUTRITION RISK NOTIFICATION - TREATMENT: THE FOLLOWING DIET HAS BEEN RECOMMENDED
Diet, NPO after Midnight:      NPO Start Date: 21-Jan-2025,   NPO Start Time: 23:59 (01-21-25 @ 19:07) [Active]  Diet, Regular:   Kosher (01-20-25 @ 11:06) [Active]

## 2025-01-22 NOTE — PROGRESS NOTE ADULT - SUBJECTIVE AND OBJECTIVE BOX
Indication for Geriatrics and Palliative Care Services/INTERVAL HPI:  SUBJECTIVE AND OBJECTIVE: Pt reporting pain minimally improved. Shared pain meds relieve the pain enough to be comfortable lying in bed but does not relieve pain enough to get out of bed. Also shared frustration about pain not improving leading to loss of independence when she was able to be independent a week ago.     OVERNIGHT EVENTS: no events. Required oxy PRN x1 and dilaudid PRN x2 in last 24h 7a-7a.    DNR on chart:  Allergies    Keflex (Other)    Intolerances    MEDICATIONS  (STANDING):  ALPRAZolam 2 milliGRAM(s) Oral at bedtime  cholecalciferol 2000 Unit(s) Oral daily  enoxaparin Injectable 40 milliGRAM(s) SubCutaneous every 24 hours  lactated ringers. 1000 milliLiter(s) (100 mL/Hr) IV Continuous <Continuous>  melatonin 3 milliGRAM(s) Oral once  polyethylene glycol 3350 17 Gram(s) Oral two times a day  senna 2 Tablet(s) Oral at bedtime  sertraline 100 milliGRAM(s) Oral daily    MEDICATIONS  (PRN):  ALPRAZolam 1 milliGRAM(s) Oral daily PRN anxiety  aluminum hydroxide/magnesium hydroxide/simethicone Suspension 30 milliLiter(s) Oral every 4 hours PRN Dyspepsia  HYDROmorphone  Injectable 0.8 milliGRAM(s) IV Push every 4 hours PRN breakthrough pain  HYDROmorphone  Injectable 0.5 milliGRAM(s) IV Push every 10 minutes PRN Moderate Pain (4 - 6)  magnesium hydroxide Suspension 30 milliLiter(s) Oral daily PRN Constipation  naloxone Injectable 0.1 milliGRAM(s) IV Push every 3 minutes PRN overdose  ondansetron Injectable 4 milliGRAM(s) IV Push every 8 hours PRN Nausea and/or Vomiting  oxyCODONE    IR 5 milliGRAM(s) Oral every 4 hours PRN Moderate Pain (4 - 6)  oxyCODONE    IR 10 milliGRAM(s) Oral every 4 hours PRN Severe Pain (7 - 10)  tiZANidine 2 milliGRAM(s) Oral at bedtime PRN Muscle Spasm      ITEMS UNCHECKED ARE NOT PRESENT    PRESENT SYMPTOMS: [ ]Unable to self-report - see [ ] CPOT [ ] PAINADS [ ] RDOS  Source if other than patient:  [ ]Family   [ ]Team     Pain: [x ]yes [ ]no  QOL impact - unable to to function/do daily tasks  Location -   R lower back pain and RLQ pain                 Aggravating factors - movement and laying on L side  Quality - sharp  Radiation - non-radiating  Timing- constant  Severity (0-10 scale): 3/10 when lying still, 8/10 when moving  Minimal acceptable level (0-10 scale): 2-3/10    CPOT:    https://www.[facility].org/getattachment/peg21l69-8s3i-8c9v-5o8x-6474v1911t3u/Critical-Care-Pain-Observation-Tool-(CPOT)    Dyspnea:                           [ ]Mild [ ]Moderate [ ]Severe  Anxiety:                             [ ]Mild [ ]Moderate [ ]Severe  Fatigue:                             [ ]Mild [ ]Moderate [ ]Severe  Nausea:                             [ ]Mild [ ]Moderate [ ]Severe  Loss of appetite:              [ ]Mild [ ]Moderate [ ]Severe  Constipation:                    [ ]Mild [ ]Moderate [ ]Severe    PCSSQ[Palliative Care Spiritual Screening Question]   Severity (0-10):  Score of 4 or > indicate consideration of Chaplaincy referral.  Chaplaincy Referral: [x ] yes [ ] refused [ ] following [ ] Deferred     Caregiver [place]? : [ ] yes [ x] no [ ] Deferred [ ] Declined             Social work referral [ ] Patient & Family Centered Care Referral [ ]     Anticipatory Grief present?:  [ x] yes [ ] no  [ ] Deferred                  Social work referral [x ] Chaplaincy Referral[x ]      Other Symptoms:  [ x]All other review of systems negative   [ ]Unable to obtain due to poor mentation    Palliative Performance Status Version 2:    40     %      http://npcrc.org/files/news/palliative_performance_scale_ppsv2.pdf  PHYSICAL EXAM:  Vital Signs Last 24 Hrs  T(C): 36.7 (22 Jan 2025 12:30), Max: 37 (21 Jan 2025 19:58)  T(F): 98 (22 Jan 2025 12:30), Max: 98.6 (21 Jan 2025 19:58)  HR: 71 (22 Jan 2025 13:30) (67 - 78)  BP: 114/70 (22 Jan 2025 13:30) (102/53 - 128/60)  BP(mean): 76 (22 Jan 2025 13:00) (76 - 76)  RR: 16 (22 Jan 2025 13:30) (9 - 18)  SpO2: 97% (22 Jan 2025 13:30) (92% - 99%)    Parameters below as of 22 Jan 2025 13:30  Patient On (Oxygen Delivery Method): room air     I&O's Summary     GENERAL: [ ]Cachexia    [x ]Alert  [ x]Oriented x3   [ ]Lethargic  [ ]Unarousable  [ x]Verbal  [ ]Non-Verbal  Behavioral:   [ ] Anxiety  [ ] Delirium [ ] Agitation [ ] Other  HEENT:  [ x]Normal   [ ]Dry mouth   [ ]ET Tube/Trach  [ ]Oral lesions  PULMONARY:   [x ]Clear [ ]Tachypnea  [ ]Audible excessive secretions   [ ]Rhonchi        [ ]Right [ ]Left [ ]Bilateral  [ ]Crackles        [ ]Right [ ]Left [ ]Bilateral  [ ]Wheezing     [ ]Right [ ]Left [ ]Bilateral  [ ]Diminished breath sounds [ ]right [ ]left [ ]bilateral  CARDIOVASCULAR:    [ ]Regular [ ]Irregular [ ]Tachy  [ ]Per [ ]Murmur [ ]Other  GASTROINTESTINAL:  [ x]Soft  [ ]Distended   [ x]+BS  [ x]Non tender [ ]Tender  [ ]Other [ ]PEG [ ]OGT/ NGT  Last BM:  GENITOURINARY:  [x ]Normal [ ] Incontinent   [ ]Oliguria/Anuria   [ ]Marcial  MUSCULOSKELETAL:   [ x]Normal   [ ]Weakness  [ ]Bed/Wheelchair bound [ ]Edema  NEUROLOGIC:   [x ]No focal deficits  [ ]Cognitive impairment  [ ]Dysphagia [ ]Dysarthria [ ]Paresis [ ]Other   SKIN:   [ ]Normal  [ ]Rash  [ ]Other  [ ]Pressure ulcer(s)       Present on admission [ ]y [ ]n      CRITICAL CARE:  [ ]Shock Present  [ ]Septic [ ]Cardiogenic [ ]Neurologic [ ]Hypovolemic  [ ]Vasopressors [ ]Inotropes  [ ]Respiratory failure present [ ]Mechanical Ventilation [ ]Non-invasive ventilatory support [ ]High-Flow   [ ]Acute  [ ]Chronic [ ]Hypoxic  [ ]Hypercarbic [ ]Other  [ ]Other organ failure     LABS:                        12.3   8.49  )-----------( 96       ( 21 Jan 2025 11:47 )             37.7   01-21    139  |  102  |  10  ----------------------------<  78  4.4   |  26  |  0.52    Ca    8.8      21 Jan 2025 11:47  Phos  2.1     01-21  Mg     1.9     01-21    TPro  6.0  /  Alb  3.3  /  TBili  1.8[H]  /  DBili  x   /  AST  300[H]  /  ALT  145[H]  /  AlkPhos  218[H]  01-21      Urinalysis Basic - ( 21 Jan 2025 11:47 )    Color: x / Appearance: x / SG: x / pH: x  Gluc: 78 mg/dL / Ketone: x  / Bili: x / Urobili: x   Blood: x / Protein: x / Nitrite: x   Leuk Esterase: x / RBC: x / WBC x   Sq Epi: x / Non Sq Epi: x / Bacteria: x      RADIOLOGY & ADDITIONAL STUDIES:    Protein Calorie Malnutrition Present: [ ]mild [ ]moderate [ ]severe [ ]underweight [ ]morbid obesity  https://www.andeal.org/vault/2440/web/files/ONC/Table_Clinical%20Characteristics%20to%20Document%20Malnutrition-White%20JV%20et%20al%202012.pdf    Height (cm): 165.1 (01-22-25 @ 08:56), 165.1 (01-14-25 @ 02:37), 165.1 (01-13-25 @ 19:43)  Weight (kg): 51.7 (01-22-25 @ 08:56), 53.5 (01-13-25 @ 19:43), 53 (12-20-24 @ 13:00)  BMI (kg/m2): 19 (01-22-25 @ 08:56), 19.6 (01-14-25 @ 02:37), 19.6 (01-13-25 @ 19:43)    [ ]PPSV2 < or = 30%  [ ]significant weight loss [ ]poor nutritional intake [ ]anasarca[ ]Artificial Nutrition    Other REFERRALS:  [ ]Hospice  [ ]Child Life  [ ]Social Work  [ ]Case management [ ]Holistic Therapy     Goals of Care Document:

## 2025-01-22 NOTE — PROGRESS NOTE ADULT - PROBLEM SELECTOR PLAN 4
dehydrated from poor po   LR x 24 hours  lovenox for elevated improve score with active malignancy  anticipate transfer to [facility] if plans for RT, although she may also be appropriate for OP f/u

## 2025-01-22 NOTE — PROGRESS NOTE ADULT - PROBLEM SELECTOR PLAN 5
In the event of worsening symptoms, please contact the Palliative Medicine team via pager (if the patient is at [facility] #[phone] or if the patient is at [facility] #[phone]) The Geriatric and Palliative Medicine service has coverage 24 hours a day/ 7 days a week to provide medical recommendations regarding symptom management needs via telephone.    Note incomplete until attested by attending.

## 2025-01-22 NOTE — PROGRESS NOTE ADULT - PROBLEM SELECTOR PLAN 2
Pt reporting last BM 6 days ago  - on miralax BID and senna 2 tab qhs  - recommend adding dulcolax and considering enema if pt continues without BM.

## 2025-01-22 NOTE — PHYSICAL THERAPY INITIAL EVALUATION ADULT - GENERAL OBSERVATIONS, REHAB EVAL
received semisupine in bed, A&Ox4, following commands, pleasant & eager to participate, h/o carcinoma, a/w intractable pain, as per MD Mcintosh, pt WBAT, cleared to participate in PT evaluation. Pt pre-medicated prior to session.

## 2025-01-22 NOTE — DIETITIAN INITIAL EVALUATION ADULT - OTHER INFO
- Wt hx:         - UBW: was ~122 pounds per pt, now less (unable to quantify).         - Dosing wt: 114 pounds (1/20)         - No new wts to assess per chart; Wt hx per [facility] in pounds: 116 (12/21), 117 (10/25), 122 (8/14)  	-- Possible ~8 lb (6.5%) wt loss x ~6 months indicated.          - RD to continue to monitor weight trends as able.   - Nutritionally Pertinent Meds in-house: IVF.  - Micronutrient supplementation: vitamin D3.   - Nutritionally Pertinent Labs: Hypophosphatemia noted.     - Heme/Onc:  	- Stage 2 breast cancer metastatic to bones.     - Palliative following for cancer-related pain management; pt with intractable back pain.

## 2025-01-22 NOTE — DIETITIAN INITIAL EVALUATION ADULT - PERTINENT LABORATORY DATA
01-21    139  |  102  |  10  ----------------------------<  78  4.4   |  26  |  0.52    Ca    8.8      21 Jan 2025 11:47  Phos  2.1     01-21  Mg     1.9     01-21    TPro  6.0  /  Alb  3.3  /  TBili  1.8[H]  /  DBili  x   /  AST  300[H]  /  ALT  145[H]  /  AlkPhos  218[H]  01-21

## 2025-01-22 NOTE — DIETITIAN INITIAL EVALUATION ADULT - NS FNS DIET ORDER
Diet, NPO after Midnight:      NPO Start Date: 21-Jan-2025,   NPO Start Time: 23:59 (01-21-25 @ 19:07)

## 2025-01-22 NOTE — PROGRESS NOTE ADULT - PROBLEM SELECTOR PLAN 3
Follows with Dr. Obando at [facility]   - onc recommendations appreciated  - rad onc recommendations appreciated.

## 2025-01-22 NOTE — DIETITIAN INITIAL EVALUATION ADULT - OTHER CALCULATIONS
Fluid needs deferred to team.  Estimated needs using dosing wt with consideration for underweight Malnutrition factor, metastatic CA.

## 2025-01-22 NOTE — PROGRESS NOTE ADULT - SUBJECTIVE AND OBJECTIVE BOX
Nadir Mcintosh MD  Division of Hospital Medicine  Available on MS teams until 7pm  If no response or off-hours, page [phone]  -------------------------------------    Patient is a 49y old  Female who presents with a chief complaint of intractable pain (22 Jan 2025 13:43)      SUBJECTIVE / OVERNIGHT EVENTS: none acute  ADDITIONAL REVIEW OF SYSTEMS: pt notes increase in pain since yesterday. States tizanidine made her dizzy for prolonged period of time and wishes to try something else- amenable to robaxin. Otherwise does not want to start xeloda again just yet due to fears of further adverse effects she will not tolerate on top of her current pain.    MEDICATIONS  (STANDING):  ALPRAZolam 2 milliGRAM(s) Oral at bedtime  cholecalciferol 2000 Unit(s) Oral daily  enoxaparin Injectable 40 milliGRAM(s) SubCutaneous every 24 hours  lactated ringers. 1000 milliLiter(s) (100 mL/Hr) IV Continuous <Continuous>  melatonin 3 milliGRAM(s) Oral once  polyethylene glycol 3350 17 Gram(s) Oral two times a day  senna 2 Tablet(s) Oral at bedtime  sertraline 100 milliGRAM(s) Oral daily    MEDICATIONS  (PRN):  ALPRAZolam 1 milliGRAM(s) Oral daily PRN anxiety  aluminum hydroxide/magnesium hydroxide/simethicone Suspension 30 milliLiter(s) Oral every 4 hours PRN Dyspepsia  HYDROmorphone  Injectable 0.5 milliGRAM(s) IV Push every 10 minutes PRN Moderate Pain (4 - 6)  HYDROmorphone  Injectable 0.8 milliGRAM(s) IV Push every 4 hours PRN breakthrough pain  magnesium hydroxide Suspension 30 milliLiter(s) Oral daily PRN Constipation  naloxone Injectable 0.1 milliGRAM(s) IV Push every 3 minutes PRN overdose  ondansetron Injectable 4 milliGRAM(s) IV Push every 8 hours PRN Nausea and/or Vomiting  oxyCODONE    IR 10 milliGRAM(s) Oral every 4 hours PRN Moderate Pain (4 - 6)  oxyCODONE    IR 15 milliGRAM(s) Oral every 4 hours PRN Severe Pain (7 - 10)      CAPILLARY BLOOD GLUCOSE        I&O's Summary      PHYSICAL EXAM:  Vital Signs Last 24 Hrs  T(C): 36.7 (22 Jan 2025 12:30), Max: 37 (21 Jan 2025 19:58)  T(F): 98 (22 Jan 2025 12:30), Max: 98.6 (21 Jan 2025 19:58)  HR: 71 (22 Jan 2025 13:30) (67 - 78)  BP: 114/70 (22 Jan 2025 13:30) (102/53 - 128/60)  BP(mean): 76 (22 Jan 2025 13:00) (76 - 76)  RR: 16 (22 Jan 2025 13:30) (9 - 18)  SpO2: 97% (22 Jan 2025 13:30) (92% - 99%)    Parameters below as of 22 Jan 2025 13:30  Patient On (Oxygen Delivery Method): room air      CONSTITUTIONAL: NAD  EYES: PERRLA; conjunctiva and sclera clear  ENMT: MMM  NECK: Supple  RESPIRATORY: Normal respiratory effort; CTAB  CARDIOVASCULAR: RRR, no JVD, no peripheral edema   ABDOMEN: Nontender to palpation, normoactive BS, no guarding/rigidity  MUSCLOSKELETAL:  no clubbing/cyanosis, no joint swelling or tenderness to palpation  PSYCH: A+O x 3, affect normal  NEUROLOGY: CN 2-12 are intact and symmetric; no gross sensory or motor deficits  SKIN: No rashes; no palpable lesions    LABS:                        12.3   8.49  )-----------( 96       ( 21 Jan 2025 11:47 )             37.7     01-21    139  |  102  |  10  ----------------------------<  78  4.4   |  26  |  0.52    Ca    8.8      21 Jan 2025 11:47  Phos  2.1     01-21  Mg     1.9     01-21    TPro  6.0  /  Alb  3.3  /  TBili  1.8[H]  /  DBili  x   /  AST  300[H]  /  ALT  145[H]  /  AlkPhos  218[H]  01-21          Urinalysis Basic - ( 21 Jan 2025 11:47 )    Color: x / Appearance: x / SG: x / pH: x  Gluc: 78 mg/dL / Ketone: x  / Bili: x / Urobili: x   Blood: x / Protein: x / Nitrite: x   Leuk Esterase: x / RBC: x / WBC x   Sq Epi: x / Non Sq Epi: x / Bacteria: x          RADIOLOGY & ADDITIONAL TESTS:  Results Reviewed:   Imaging Personally Reviewed:  Electrocardiogram Personally Reviewed:    COORDINATION OF CARE:  Care Discussed with Consultants/Other Providers [Y/N]: palliative  Prior or Outpatient Records Reviewed [Y/N]:

## 2025-01-23 NOTE — PROGRESS NOTE ADULT - PROBLEM SELECTOR PLAN 2
Pt reporting last BM 6 days ago  - on miralax BID and senna 2 tab qhs  - recommend adding dulcolax and considering enema if pt continues without BM. - on miralax BID, senna 2 tab qhs, milk of magnesia   - Bowel regimen while on opioids. Monitor for constipation

## 2025-01-23 NOTE — PROGRESS NOTE ADULT - PROBLEM SELECTOR PLAN 5
In the event of worsening symptoms, please contact the Palliative Medicine team via pager (if the patient is at [facility] #[phone] or if the patient is at [facility] #[phone]) The Geriatric and Palliative Medicine service has coverage 24 hours a day/ 7 days a week to provide medical recommendations regarding symptom management needs via telephone.    Note incomplete until attested by attending.

## 2025-01-23 NOTE — PROGRESS NOTE ADULT - PROBLEM SELECTOR PLAN 5
due to known liver mets  trend LFTS    dispo: pending pain control, MR L/S spine, med onc treatment plan, PT eval, anticipate 1-2 days  plan d/w and agreed on by pt, mother at bedside

## 2025-01-23 NOTE — PROGRESS NOTE ADULT - PROBLEM SELECTOR PLAN 1
Follows with Dr. Booker outpatient. Reporting minimal improvement in pain. Suspect component of existential pain  - MRI Thoracic spine done outpatient on 1/17/25 shows- Partially treated diffuse osseous metastases. Largest residual enhancing osteolytic metastasis within the field of view arising within the posterior spinous processes T5.  - increase oxy 5mg q4h PRN MP (no PRNs required in last 24hr 7am-7am) to oxy 10mg q4h PRN MP  - increase oxy 10mg q4h PRN SP (1 PRN required in last 24hr 7am-7am) to oxy 15mg q4h PRN SP  - c/w IV dilaudid 0.8mg q4h PRN breakthrough pain (2 PRNs required in last 24hr 7am-7am)   - would dc tizanidine, pt shared she does not like taking it because it makes her very dizzy for several hours  - narcan PRN  - Bowel regimen while on opioids. Monitor for constipation.  - chaplaincy and SW referrals made Follows with Dr. Booker outpatient. Reporting minimal improvement in pain. Suspect component of existential pain  - MRI Thoracic spine done outpatient on 1/17/25 shows- Partially treated diffuse osseous metastases. Largest residual enhancing osteolytic metastasis within the field of view arising within the posterior spinous processes T5.  - c/w oxy 10mg q4h PRN MP (1 PRNs required in last 24hr 7am-7am)   - c/w oxy 15mg q4h PRN SP (no PRNs required in last 24hr 7am-7am)   - dc IV dilaudid 0.8mg q4h PRN breakthrough pain (2 PRNs required in last 24hr 7am-7am)   - narcan PRN  - Bowel regimen while on opioids. Monitor for constipation.  - chaplaincy and SW referrals made

## 2025-01-23 NOTE — PROGRESS NOTE ADULT - SUBJECTIVE AND OBJECTIVE BOX
Indication for Geriatrics and Palliative Care Services/INTERVAL HPI:  SUBJECTIVE AND OBJECTIVE:    OVERNIGHT EVENTS: Required oxy PRN x1 and IV dilaudid PRN x2 in last 24h 7a-7a.     DNR on chart:  Allergies    Keflex (Other)    Intolerances    MEDICATIONS  (STANDING):  ALPRAZolam 2 milliGRAM(s) Oral at bedtime  cholecalciferol 2000 Unit(s) Oral daily  enoxaparin Injectable 40 milliGRAM(s) SubCutaneous every 24 hours  lactated ringers. 1000 milliLiter(s) (100 mL/Hr) IV Continuous <Continuous>  melatonin 3 milliGRAM(s) Oral once  polyethylene glycol 3350 17 Gram(s) Oral two times a day  senna 2 Tablet(s) Oral at bedtime  sertraline 100 milliGRAM(s) Oral daily    MEDICATIONS  (PRN):  ALPRAZolam 1 milliGRAM(s) Oral daily PRN anxiety  aluminum hydroxide/magnesium hydroxide/simethicone Suspension 30 milliLiter(s) Oral every 4 hours PRN Dyspepsia  HYDROmorphone  Injectable 0.5 milliGRAM(s) IV Push every 10 minutes PRN Moderate Pain (4 - 6)  HYDROmorphone  Injectable 0.8 milliGRAM(s) IV Push every 4 hours PRN breakthrough pain  magnesium hydroxide Suspension 30 milliLiter(s) Oral daily PRN Constipation  methocarbamol 500 milliGRAM(s) Oral two times a day PRN Muscle Spasm  naloxone Injectable 0.1 milliGRAM(s) IV Push every 3 minutes PRN overdose  ondansetron Injectable 4 milliGRAM(s) IV Push every 8 hours PRN Nausea and/or Vomiting  oxyCODONE    IR 10 milliGRAM(s) Oral every 4 hours PRN Moderate Pain (4 - 6)  oxyCODONE    IR 15 milliGRAM(s) Oral every 4 hours PRN Severe Pain (7 - 10)      ITEMS UNCHECKED ARE NOT PRESENT    PRESENT SYMPTOMS: [ ]Unable to self-report - see [ ] CPOT [ ] PAINADS [ ] RDOS  Source if other than patient:  [ ]Family   [ ]Team     Pain:  [ ]yes [ ]no  QOL impact -   Location -                    Aggravating factors -  Quality -  Radiation -  Timing-  Severity (0-10 scale):  Minimal acceptable level (0-10 scale):     CPOT:    https://www.sccm.org/getattachment/mrg19i95-7r2d-[email or web address]-6162y1691m1y/Critical-Care-Pain-Observation-Tool-(CPOT)    Dyspnea:                           [ ]Mild [ ]Moderate [ ]Severe  Anxiety:                             [ ]Mild [ ]Moderate [ ]Severe  Fatigue:                             [ ]Mild [ ]Moderate [ ]Severe  Nausea:                             [ ]Mild [ ]Moderate [ ]Severe  Loss of appetite:              [ ]Mild [ ]Moderate [ ]Severe  Constipation:                    [ ]Mild [ ]Moderate [ ]Severe    PCSSQ[Palliative Care Spiritual Screening Question]   Severity (0-10):  Score of 4 or > indicate consideration of Chaplaincy referral.  Chaplaincy Referral: [ ] yes [ ] refused [ ] following [ ] Deferred     Caregiver [place]? : [ ] yes [ ] no [ ] Deferred [ ] Declined             Social work referral [ ] Patient & Family Centered Care Referral [ ]     Anticipatory Grief present?:  [ ] yes [ ] no  [ ] Deferred                  Social work referral [ ] Chaplaincy Referral[ ]      Other Symptoms:  [ ]All other review of systems negative   [ ]Unable to obtain due to poor mentation    Palliative Performance Status Version 2:         %      http://npcrc.org/files/news/palliative_performance_scale_ppsv2.pdf  PHYSICAL EXAM:  Vital Signs Last 24 Hrs  T(C): 36.7 (23 Jan 2025 05:15), Max: 36.7 (22 Jan 2025 12:30)  T(F): 98 (23 Jan 2025 05:15), Max: 98 (22 Jan 2025 12:30)  HR: 70 (23 Jan 2025 05:15) (70 - 78)  BP: 107/67 (23 Jan 2025 05:15) (102/53 - 128/60)  BP(mean): 76 (22 Jan 2025 13:00) (76 - 76)  RR: 18 (23 Jan 2025 05:15) (9 - 18)  SpO2: 92% (23 Jan 2025 05:15) (92% - 99%)    Parameters below as of 23 Jan 2025 05:15  Patient On (Oxygen Delivery Method): room air     I&O's Summary     GENERAL: [ ]Cachexia    [x ]Alert  [ x]Oriented x3   [ ]Lethargic  [ ]Unarousable  [ x]Verbal  [ ]Non-Verbal  Behavioral:   [ ] Anxiety  [ ] Delirium [ ] Agitation [ ] Other  HEENT:  [ x]Normal   [ ]Dry mouth   [ ]ET Tube/Trach  [ ]Oral lesions  PULMONARY:   [x ]Clear [ ]Tachypnea  [ ]Audible excessive secretions   [ ]Rhonchi        [ ]Right [ ]Left [ ]Bilateral  [ ]Crackles        [ ]Right [ ]Left [ ]Bilateral  [ ]Wheezing     [ ]Right [ ]Left [ ]Bilateral  [ ]Diminished breath sounds [ ]right [ ]left [ ]bilateral  CARDIOVASCULAR:    [ ]Regular [ ]Irregular [ ]Tachy  [ ]Per [ ]Murmur [ ]Other  GASTROINTESTINAL:  [ x]Soft  [ ]Distended   [ x]+BS  [ x]Non tender [ ]Tender  [ ]Other [ ]PEG [ ]OGT/ NGT  Last BM:  GENITOURINARY:  [x ]Normal [ ] Incontinent   [ ]Oliguria/Anuria   [ ]Marcial  MUSCULOSKELETAL:   [ x]Normal   [ ]Weakness  [ ]Bed/Wheelchair bound [ ]Edema  NEUROLOGIC:   [x ]No focal deficits  [ ]Cognitive impairment  [ ]Dysphagia [ ]Dysarthria [ ]Paresis [ ]Other   SKIN:   [ ]Normal  [ ]Rash  [ ]Other  [ ]Pressure ulcer(s)       Present on admission [ ]y [ ]n    CRITICAL CARE:  [ ]Shock Present  [ ]Septic [ ]Cardiogenic [ ]Neurologic [ ]Hypovolemic  [ ]Vasopressors [ ]Inotropes  [ ]Respiratory failure present [ ]Mechanical Ventilation [ ]Non-invasive ventilatory support [ ]High-Flow   [ ]Acute  [ ]Chronic [ ]Hypoxic  [ ]Hypercarbic [ ]Other  [ ]Other organ failure     LABS:                        13.2   7.97  )-----------( 114      ( 22 Jan 2025 18:20 )             41.4   01-22    137  |  101  |  11  ----------------------------<  143[H]  4.6   |  25  |  0.55    Ca    8.9      22 Jan 2025 18:20  Phos  2.1     01-21  Mg     1.9     01-21    TPro  6.0  /  Alb  3.3  /  TBili  1.8[H]  /  DBili  x   /  AST  300[H]  /  ALT  145[H]  /  AlkPhos  218[H]  01-21      Urinalysis Basic - ( 22 Jan 2025 18:20 )    Color: x / Appearance: x / SG: x / pH: x  Gluc: 143 mg/dL / Ketone: x  / Bili: x / Urobili: x   Blood: x / Protein: x / Nitrite: x   Leuk Esterase: x / RBC: x / WBC x   Sq Epi: x / Non Sq Epi: x / Bacteria: x      RADIOLOGY & ADDITIONAL STUDIES:    Protein Calorie Malnutrition Present: [ ]mild [ ]moderate [ ]severe [ ]underweight [ ]morbid obesity  https://www.andeal.org/vault/2440/web/files/ONC/Table_Clinical%20Characteristics%20to%20Document%20Malnutrition-White%20JV%20et%20al%202012.pdf    Height (cm): 165.1 (01-22-25 @ 08:56), 165.1 (01-14-25 @ 02:37), 165.1 (01-13-25 @ 19:43)  Weight (kg): 51.7 (01-22-25 @ 08:56), 53.5 (01-13-25 @ 19:43), 53 (12-20-24 @ 13:00)  BMI (kg/m2): 19 (01-22-25 @ 08:56), 19.6 (01-14-25 @ 02:37), 19.6 (01-13-25 @ 19:43)    [ ]PPSV2 < or = 30%  [ ]significant weight loss [ ]poor nutritional intake [ ]anasarca[ ]Artificial Nutrition    Other REFERRALS:  [ ]Hospice  [ ]Child Life  [ ]Social Work  [ ]Case management [ ]Holistic Therapy     Goals of Care Document: Indication for Geriatrics and Palliative Care Services/INTERVAL HPI:  SUBJECTIVE AND OBJECTIVE: Pt reporting continued pain unchanged. Shared she wants pain to be controlled to be able to get out of bed but also shared the pain medication makes her sleepy when she wants to be alert. Discussed pt fine line between pain control and sedation. Also discussed need to get pain controlled on PO regimen to get her to her goal of home and continued treatment with onc. Pt agreeable to stopping IV dilaudid.     OVERNIGHT EVENTS: Required oxy PRN x1 and IV dilaudid PRN x2 in last 24h 7a-7a.     DNR on chart:  Allergies    Keflex (Other)    Intolerances    MEDICATIONS  (STANDING):  ALPRAZolam 2 milliGRAM(s) Oral at bedtime  cholecalciferol 2000 Unit(s) Oral daily  enoxaparin Injectable 40 milliGRAM(s) SubCutaneous every 24 hours  lactated ringers. 1000 milliLiter(s) (100 mL/Hr) IV Continuous <Continuous>  melatonin 3 milliGRAM(s) Oral once  polyethylene glycol 3350 17 Gram(s) Oral two times a day  senna 2 Tablet(s) Oral at bedtime  sertraline 100 milliGRAM(s) Oral daily    MEDICATIONS  (PRN):  ALPRAZolam 1 milliGRAM(s) Oral daily PRN anxiety  aluminum hydroxide/magnesium hydroxide/simethicone Suspension 30 milliLiter(s) Oral every 4 hours PRN Dyspepsia  HYDROmorphone  Injectable 0.5 milliGRAM(s) IV Push every 10 minutes PRN Moderate Pain (4 - 6)  HYDROmorphone  Injectable 0.8 milliGRAM(s) IV Push every 4 hours PRN breakthrough pain  magnesium hydroxide Suspension 30 milliLiter(s) Oral daily PRN Constipation  methocarbamol 500 milliGRAM(s) Oral two times a day PRN Muscle Spasm  naloxone Injectable 0.1 milliGRAM(s) IV Push every 3 minutes PRN overdose  ondansetron Injectable 4 milliGRAM(s) IV Push every 8 hours PRN Nausea and/or Vomiting  oxyCODONE    IR 10 milliGRAM(s) Oral every 4 hours PRN Moderate Pain (4 - 6)  oxyCODONE    IR 15 milliGRAM(s) Oral every 4 hours PRN Severe Pain (7 - 10)      ITEMS UNCHECKED ARE NOT PRESENT    PRESENT SYMPTOMS: [ ]Unable to self-report - see [ ] CPOT [ ] PAINADS [ ] RDOS  Source if other than patient:  [ ]Family   [ ]Team     Pain: [x ]yes [ ]no  QOL impact - unable to to function/do daily tasks  Location -   R lower back pain and RLQ pain                 Aggravating factors - movement and laying on L side  Quality - sharp  Radiation - non-radiating  Timing- constant  Severity (0-10 scale): 3/10 when lying still, 8/10 when moving  Minimal acceptable level (0-10 scale): 2-3/10    CPOT:    https://www.[facility].org/getattachment/aqe25y94-5v0v-0m1q-4g3v-0837c0967f1z/Critical-Care-Pain-Observation-Tool-(CPOT)    Dyspnea:                           [ ]Mild [ ]Moderate [ ]Severe  Anxiety:                             [ ]Mild [ ]Moderate [ ]Severe  Fatigue:                             [ ]Mild [ ]Moderate [ ]Severe  Nausea:                             [ ]Mild [ ]Moderate [ ]Severe  Loss of appetite:              [ ]Mild [ ]Moderate [ ]Severe  Constipation:                    [ ]Mild [ ]Moderate [ ]Severe    PCSSQ[Palliative Care Spiritual Screening Question]   Severity (0-10):  Score of 4 or > indicate consideration of Chaplaincy referral.  Chaplaincy Referral: [x ] yes [ ] refused [ ] following [ ] Deferred     Caregiver [place]? : [ ] yes [ x] no [ ] Deferred [ ] Declined             Social work referral [ ] Patient & Family Centered Care Referral [ ]     Anticipatory Grief present?:  [x ] yes [ ] no  [ ] Deferred                  Social work referral [ x] Chaplaincy Referral[ x]      Other Symptoms:  [ x]All other review of systems negative   [ ]Unable to obtain due to poor mentation    Palliative Performance Status Version 2:    40     %      http://npcrc.org/files/news/palliative_performance_scale_ppsv2.pdf  PHYSICAL EXAM:  Vital Signs Last 24 Hrs  T(C): 36.7 (23 Jan 2025 05:15), Max: 36.7 (22 Jan 2025 12:30)  T(F): 98 (23 Jan 2025 05:15), Max: 98 (22 Jan 2025 12:30)  HR: 70 (23 Jan 2025 05:15) (70 - 78)  BP: 107/67 (23 Jan 2025 05:15) (102/53 - 128/60)  BP(mean): 76 (22 Jan 2025 13:00) (76 - 76)  RR: 18 (23 Jan 2025 05:15) (9 - 18)  SpO2: 92% (23 Jan 2025 05:15) (92% - 99%)    Parameters below as of 23 Jan 2025 05:15  Patient On (Oxygen Delivery Method): room air     I&O's Summary     GENERAL: [ ]Cachexia    [x ]Alert  [ x]Oriented x3   [ ]Lethargic  [ ]Unarousable  [ x]Verbal  [ ]Non-Verbal  Behavioral:   [ ] Anxiety  [ ] Delirium [ ] Agitation [ ] Other  HEENT:  [ x]Normal   [ ]Dry mouth   [ ]ET Tube/Trach  [ ]Oral lesions  PULMONARY:   [x ]Clear [ ]Tachypnea  [ ]Audible excessive secretions   [ ]Rhonchi        [ ]Right [ ]Left [ ]Bilateral  [ ]Crackles        [ ]Right [ ]Left [ ]Bilateral  [ ]Wheezing     [ ]Right [ ]Left [ ]Bilateral  [ ]Diminished breath sounds [ ]right [ ]left [ ]bilateral  CARDIOVASCULAR:    [ ]Regular [ ]Irregular [ ]Tachy  [ ]Per [ ]Murmur [ ]Other  GASTROINTESTINAL:  [ x]Soft  [ ]Distended   [ x]+BS  [ x]Non tender [ ]Tender  [ ]Other [ ]PEG [ ]OGT/ NGT  Last BM:  GENITOURINARY:  [x ]Normal [ ] Incontinent   [ ]Oliguria/Anuria   [ ]Marcial  MUSCULOSKELETAL:   [ x]Normal   [ ]Weakness  [ ]Bed/Wheelchair bound [ ]Edema  NEUROLOGIC:   [x ]No focal deficits  [ ]Cognitive impairment  [ ]Dysphagia [ ]Dysarthria [ ]Paresis [ ]Other   SKIN:   [ ]Normal  [ ]Rash  [ ]Other  [ ]Pressure ulcer(s)       Present on admission [ ]y [ ]n    CRITICAL CARE:  [ ]Shock Present  [ ]Septic [ ]Cardiogenic [ ]Neurologic [ ]Hypovolemic  [ ]Vasopressors [ ]Inotropes  [ ]Respiratory failure present [ ]Mechanical Ventilation [ ]Non-invasive ventilatory support [ ]High-Flow   [ ]Acute  [ ]Chronic [ ]Hypoxic  [ ]Hypercarbic [ ]Other  [ ]Other organ failure     LABS:                        13.2   7.97  )-----------( 114      ( 22 Jan 2025 18:20 )             41.4   01-22    137  |  101  |  11  ----------------------------<  143[H]  4.6   |  25  |  0.55    Ca    8.9      22 Jan 2025 18:20  Phos  2.1     01-21  Mg     1.9     01-21    TPro  6.0  /  Alb  3.3  /  TBili  1.8[H]  /  DBili  x   /  AST  300[H]  /  ALT  145[H]  /  AlkPhos  218[H]  01-21      Urinalysis Basic - ( 22 Jan 2025 18:20 )    Color: x / Appearance: x / SG: x / pH: x  Gluc: 143 mg/dL / Ketone: x  / Bili: x / Urobili: x   Blood: x / Protein: x / Nitrite: x   Leuk Esterase: x / RBC: x / WBC x   Sq Epi: x / Non Sq Epi: x / Bacteria: x      RADIOLOGY & ADDITIONAL STUDIES:    Protein Calorie Malnutrition Present: [ ]mild [ ]moderate [ ]severe [ ]underweight [ ]morbid obesity  https://www.andeal.org/vault/2440/web/files/ONC/Table_Clinical%20Characteristics%20to%20Document%20Malnutrition-White%20JV%20et%20al%202012.pdf    Height (cm): 165.1 (01-22-25 @ 08:56), 165.1 (01-14-25 @ 02:37), 165.1 (01-13-25 @ 19:43)  Weight (kg): 51.7 (01-22-25 @ 08:56), 53.5 (01-13-25 @ 19:43), 53 (12-20-24 @ 13:00)  BMI (kg/m2): 19 (01-22-25 @ 08:56), 19.6 (01-14-25 @ 02:37), 19.6 (01-13-25 @ 19:43)    [ ]PPSV2 < or = 30%  [ ]significant weight loss [ ]poor nutritional intake [ ]anasarca[ ]Artificial Nutrition    Other REFERRALS:  [ ]Hospice  [ ]Child Life  [ ]Social Work  [ ]Case management [ ]Holistic Therapy     Goals of Care Document:

## 2025-01-23 NOTE — CHART NOTE - NSCHARTNOTEFT_GEN_A_CORE
GAP consulted to assist in active pain/symptom management and support in the setting of patient with advanced illness. LCSW met with patient for supportive intervention both yesterday, 1/22, and today, 1/23, at bedside.    LCSW met with patient and patient's sister yesterday and first introduced self and role. Patient and sister verbalized understanding and were receptive to visit today. LCSW next inquired into patient status and coping with admission, and patient notes managing as well as possible. Patient notes feeling much lethargy after undergoing scan earlier in the day, but provided some insight into her life and events leading to admission. Patient notes that she was diagnosed approximately one year ago with stage four cancer, noting a history of cancer from 2022 which resulted in a mastectomy. Patient additionally notes that prior to that cancer journey, patient had experienced several miscarriages, and notes that the past few years have had multiple difficult and traumatic events. Patient notes that she had been managing her current diagnosis well until about one week ago when symptom burden grew significantly, and notes feeling that she cannot keep doing this and that she "doesn't have it in her". LCSW explored this further and patient notes that she has chosen to move forward and continue aggressive medical management due to her sister and mother, which patient notes are her two largest supports, as she feels they would be devastated if she were to transition goals and especially once she passes away. Patient notes crying most of the time and having difficulty focusing on tasks she is trying to manage, and discussed her usual coping through compartmentalization which has been unsuccessful at present. Patient additionally notes the distress in feeling that she will be on DMT indefinitely until she is unable to receive more or chooses not to, and notes the uncertainty regarding next steps or how to proceed. LCSW validated this with patient today, and much emotional support provided.    LCSW discussed anticipatory grief with patient, and acknowledged patient grieving her functionality, her independence, and where she and her family thought they might be at present. Patient confirmed same today and notes how difficult it has been to navigate this process at times, and again LCSW provided much emotional support and active listening. LCSW discussed trying to find new and helpful ways to decompress when patient is feeling especially stressed, and patient expressed much interest in this. LCSW discussed allowing patient to rest and revisiting 1/23 to explore this further. Patient verbalized agreement and much appreciation of visit and support.    LCSW revisited patient and family (mother and sister) at bedside today for follow up supportive intervention. Patient notes feeling very tired and weak today, noting the impact that her hospitalization, pain crisis leading to admission, and overall status has had on her functionality. Patient additionally notes the large impact her functional status changes and her symptom burden has had on her mental state and coping, noting much anxiety and sadness throughout this experience. Patient does note that whilst she would prefer to feel more awake and less sedated, she would prefer to sleep more and be in less pain if the balance cannot be otherwise more even. Patient continued today by reflecting on the rapidity of her cancer progression, and again the impact this has had on her coping. LCSW validated this with patient today and much emotional support and active listening provided. Patient inquired today into possible next steps, and LCSW discussed that d/c planning and options will be based on patient status and patient goals as well. Patient verbalized understanding, and inquired into additional resources to assist in coping upon d/c. 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 LCSW validated this today and discussed providing patient with resources upon revisiting tomorrow for support, and patient verbalized much appreciation. LCSW assured ongoing availability and support today as well.    MARLEEN and LCSW will continue to follow this case.

## 2025-01-23 NOTE — CHART NOTE - NSCHARTNOTEFT_GEN_A_CORE
Oncology in agreement with starting systemic chemotherapy with Xeloda as soon as possible while inpatient.        ***************************************************************  Cheyenne Bonilla, PGY5  Fellow Hematology/Oncology  pager: [phone]   Available on Microsoft Teams  After 5pm or on weekends please contact  to page on-call fellow   ***************************************************************

## 2025-01-23 NOTE — PROGRESS NOTE ADULT - SUBJECTIVE AND OBJECTIVE BOX
Nadir Mcintosh MD  Division of Hospital Medicine  Available on MS teams until 7pm  If no response or off-hours, page [phone]  -------------------------------------    Patient is a 49y old  Female who presents with a chief complaint of intractable pain (23 Jan 2025 10:20)      SUBJECTIVE / OVERNIGHT EVENTS: none acute  ADDITIONAL REVIEW OF SYSTEMS: pt notes feeling sedated on higher dose of pain meds which she doesn't think are fully effective as she still doesn't feel she can do her daily activities with the current pain as it is.     MEDICATIONS  (STANDING):  ALPRAZolam 2 milliGRAM(s) Oral at bedtime  cholecalciferol 2000 Unit(s) Oral daily  enoxaparin Injectable 40 milliGRAM(s) SubCutaneous every 24 hours  lactated ringers. 1000 milliLiter(s) (100 mL/Hr) IV Continuous <Continuous>  melatonin 3 milliGRAM(s) Oral once  polyethylene glycol 3350 17 Gram(s) Oral two times a day  senna 2 Tablet(s) Oral at bedtime  sertraline 100 milliGRAM(s) Oral daily    MEDICATIONS  (PRN):  ALPRAZolam 1 milliGRAM(s) Oral daily PRN anxiety  aluminum hydroxide/magnesium hydroxide/simethicone Suspension 30 milliLiter(s) Oral every 4 hours PRN Dyspepsia  magnesium hydroxide Suspension 30 milliLiter(s) Oral daily PRN Constipation  methocarbamol 500 milliGRAM(s) Oral two times a day PRN Muscle Spasm  naloxone Injectable 0.1 milliGRAM(s) IV Push every 3 minutes PRN overdose  ondansetron Injectable 4 milliGRAM(s) IV Push every 8 hours PRN Nausea and/or Vomiting  oxyCODONE    IR 10 milliGRAM(s) Oral every 4 hours PRN Moderate Pain (4 - 6)  oxyCODONE    IR 15 milliGRAM(s) Oral every 4 hours PRN Severe Pain (7 - 10)      CAPILLARY BLOOD GLUCOSE        I&O's Summary    23 Jan 2025 07:01  -  23 Jan 2025 17:10  --------------------------------------------------------  IN: 580 mL / OUT: 0 mL / NET: 580 mL        PHYSICAL EXAM:  Vital Signs Last 24 Hrs  T(C): 36.4 (23 Jan 2025 11:31), Max: 36.7 (23 Jan 2025 05:15)  T(F): 97.5 (23 Jan 2025 11:31), Max: 98 (23 Jan 2025 05:15)  HR: 79 (23 Jan 2025 13:30) (70 - 85)  BP: 105/70 (23 Jan 2025 13:30) (105/67 - 107/67)  BP(mean): --  RR: 18 (23 Jan 2025 11:31) (18 - 18)  SpO2: 91% (23 Jan 2025 13:30) (90% - 92%)    Parameters below as of 23 Jan 2025 13:30  Patient On (Oxygen Delivery Method): room air      CONSTITUTIONAL: NAD  EYES: PERRLA; conjunctiva and sclera clear  ENMT: MMM  NECK: Supple  RESPIRATORY: Normal respiratory effort; CTAB  CARDIOVASCULAR: RRR, no JVD, no peripheral edema   ABDOMEN: Nontender to palpation, normoactive BS, no guarding/rigidity  MUSCLOSKELETAL:  no clubbing/cyanosis, no joint swelling or tenderness to palpation  PSYCH: A+O x 3, affect normal  NEUROLOGY: CN 2-12 are intact and symmetric; no gross sensory or motor deficits  SKIN: No rashes; no palpable lesions    LABS:                        13.2   7.97  )-----------( 114      ( 22 Jan 2025 18:20 )             41.4     01-22    137  |  101  |  11  ----------------------------<  143[H]  4.6   |  25  |  0.55    Ca    8.9      22 Jan 2025 18:20            Urinalysis Basic - ( 22 Jan 2025 18:20 )    Color: x / Appearance: x / SG: x / pH: x  Gluc: 143 mg/dL / Ketone: x  / Bili: x / Urobili: x   Blood: x / Protein: x / Nitrite: x   Leuk Esterase: x / RBC: x / WBC x   Sq Epi: x / Non Sq Epi: x / Bacteria: x          RADIOLOGY & ADDITIONAL TESTS:  Results Reviewed:   Imaging Personally Reviewed:  Electrocardiogram Personally Reviewed:    COORDINATION OF CARE:  Care Discussed with Consultants/Other Providers [Y/N]: palliative  Prior or Outpatient Records Reviewed [Y/N]:

## 2025-01-23 NOTE — PROGRESS NOTE ADULT - ASSESSMENT
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 GaP team consulted for pain management.     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 GaP team consulted for pain management.     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 GaP team consulted for pain management.

## 2025-01-23 NOTE — PROGRESS NOTE ADULT - PROBLEM SELECTOR PLAN 1
intractable.  Anticipate fentanyl patch for basal control  dilaudid dosing per palliative, stop IV dilaudid  inquired with palliative about starting gabapentin  agreeable to trialing robaxin instead of tizanidine  xanax as prescribed at home 1mg qam, 3mg qhs  palliative consult placed- anticipate transition to PO meds soon  emailed rad onc for consult- no clear localized target for therapy, will f/u MR L/S spine, recommend systemic palliative chemo  MR L/S spine showing innumerable lesions without any fractures- ok to work with PT and get OOBC

## 2025-01-24 NOTE — PROGRESS NOTE ADULT - PROBLEM SELECTOR PLAN 5
due to known liver mets  trend LFTS    dispo: pending pain control, MR L/S spine, med onc treatment plan, PT eval, anticipate 1-2 days  plan d/w and agreed on by pt,  sister at bedside

## 2025-01-24 NOTE — DISCHARGE NOTE PROVIDER - HOSPITAL COURSE
HPI:   50yo F BRCA pmh Stage II right invasive ductal carcinoma s/p ddACT followed by RT and had been on anastrozole since 2021, extensive osseous and liver mets, seen by palliative care 1/17 prescribed trial of oxycodone, tramadol, and tizanidine without relief.  Pain has been increases daily and unable to get out of bed the past 4 days.  Also unable to eat as she cannot sit upright due to pain.  Has been constipated but had a bowel movement yesterday.  Barely makes it to the bathroom due to pain and uses gloves as she cannot stand long enough due to the pain to wash her hands.  Reports right lower back is the worst and right abdomen feels like a bullet wound.  At best, oxycodone brought pain from 9 to an 8.    In ED: 1L LR, 0.5mg dilaudid @5am, 2mg morphine x 2, zofran (20 Jan 2025 10:49)    Hospital Course:      Important Medication Changes and Reason:    Active or Pending Issues Requiring Follow-up:    Daily     Daily     Advanced Directives:   [ ] Full code  [ ] DNR  [ ] Hospice    Discharge Diagnoses:

## 2025-01-24 NOTE — PROGRESS NOTE ADULT - SUBJECTIVE AND OBJECTIVE BOX
Indication for Geriatrics and Palliative Care Services/INTERVAL HPI:  SUBJECTIVE AND OBJECTIVE: Pt reporting 10/10 pain that has worsened overnight. Reporting no relief with oxy PRNs. Also reporting laying R side no longer providing relief. Sister at bedside voiced frustration that pain continues to be uncontrolled as well as concern that going up on opioids makes pt lethargic. Shared that unfortunately without appropriately escalating the opioid dose then pt will continue to report 10/10 pain; pt will need to make the decision regarding what's most important to her: having pain control with some lethargy or be active and engaging with uncontrolled pain. As far as pt has voiced to the team, pt is prioritizing pain control.     OVERNIGHT EVENTS: Required oxy 15mg x4 in last 24h 7a-7a.     DNR on chart:  Allergies    Keflex (Other)    Intolerances    MEDICATIONS  (STANDING):  ALPRAZolam 2 milliGRAM(s) Oral at bedtime  capecitabine 1500 milliGRAM(s) Oral two times a day  cholecalciferol 2000 Unit(s) Oral daily  enoxaparin Injectable 40 milliGRAM(s) SubCutaneous every 24 hours  lactated ringers. 1000 milliLiter(s) (100 mL/Hr) IV Continuous <Continuous>  melatonin 3 milliGRAM(s) Oral once  oxyCODONE  ER Tablet 20 milliGRAM(s) Oral every 12 hours  polyethylene glycol 3350 17 Gram(s) Oral two times a day  senna 2 Tablet(s) Oral at bedtime  sertraline 100 milliGRAM(s) Oral daily    MEDICATIONS  (PRN):  ALPRAZolam 1 milliGRAM(s) Oral daily PRN anxiety  aluminum hydroxide/magnesium hydroxide/simethicone Suspension 30 milliLiter(s) Oral every 4 hours PRN Dyspepsia  HYDROmorphone  Injectable 0.5 milliGRAM(s) IV Push every 4 hours PRN breakthrough pain  magnesium hydroxide Suspension 30 milliLiter(s) Oral daily PRN Constipation  methocarbamol 500 milliGRAM(s) Oral two times a day PRN Muscle Spasm  naloxone Injectable 0.1 milliGRAM(s) IV Push every 3 minutes PRN overdose  ondansetron Injectable 4 milliGRAM(s) IV Push every 8 hours PRN Nausea and/or Vomiting  oxyCODONE    IR 20 milliGRAM(s) Oral every 4 hours PRN Moderate Pain (4 - 6)  oxyCODONE    IR 25 milliGRAM(s) Oral every 4 hours PRN Severe Pain (7 - 10)      ITEMS UNCHECKED ARE NOT PRESENT    PRESENT SYMPTOMS: [ ]Unable to self-report - see [ ] CPOT [ ] PAINADS [ ] RDOS  Source if other than patient:  [ ]Family   [ ]Team     Pain: [x ]yes [ ]no  QOL impact - unable to to function/do daily tasks  Location -   R lower back pain and RLQ pain                 Aggravating factors - movement and laying on L side  Quality - sharp  Radiation - non-radiating  Timing- constant  Severity (0-10 scale): 10/10  Minimal acceptable level (0-10 scale): 2-3/10    CPOT:    https://www.[facility].org/getattachment/[email or web address]/Critical-Care-Pain-Observation-Tool-(CPOT)    Dyspnea:                           [ ]Mild [ ]Moderate [ ]Severe  Anxiety:                             [ ]Mild [ ]Moderate [ ]Severe  Fatigue:                             [ ]Mild [ ]Moderate [ ]Severe  Nausea:                             [ ]Mild [ ]Moderate [ ]Severe  Loss of appetite:              [ ]Mild [ ]Moderate [ ]Severe  Constipation:                    [ ]Mild [ ]Moderate [ ]Severe    PCSSQ[Palliative Care Spiritual Screening Question]   Severity (0-10):  Score of 4 or > indicate consideration of Chaplaincy referral.  Chaplaincy Referral: [x ] yes [ ] refused [ ] following [ ] Deferred     Caregiver [place]? : [ ] yes [ x] no [ ] Deferred [ ] Declined             Social work referral [ ] Patient & Family Centered Care Referral [ ]     Anticipatory Grief present?:  [x ] yes [ ] no  [ ] Deferred                  Social work referral [ x] Chaplaincy Referral[ x]      Other Symptoms:  [ x]All other review of systems negative   [ ]Unable to obtain due to poor mentation    Palliative Performance Status Version 2:    40     %      http://npcrc.org/files/news/palliative_performance_scale_ppsv2.pdf  PHYSICAL EXAM:  Vital Signs Last 24 Hrs  T(C): 36.7 (24 Jan 2025 11:36), Max: 36.7 (24 Jan 2025 11:36)  T(F): 98 (24 Jan 2025 11:36), Max: 98 (24 Jan 2025 11:36)  HR: 82 (24 Jan 2025 11:36) (75 - 82)  BP: 101/64 (24 Jan 2025 11:36) (101/64 - 109/74)  BP(mean): --  RR: 17 (24 Jan 2025 11:36) (17 - 17)  SpO2: 92% (24 Jan 2025 11:36) (91% - 93%)    Parameters below as of 24 Jan 2025 11:36  Patient On (Oxygen Delivery Method): room air     I&O's Summary    23 Jan 2025 07:01  -  24 Jan 2025 07:00  --------------------------------------------------------  IN: 580 mL / OUT: 0 mL / NET: 580 mL       GENERAL: [ ]Cachexia    [x ]Alert  [ x]Oriented x3   [ ]Lethargic  [ ]Unarousable  [ x]Verbal  [ ]Non-Verbal  Behavioral:   [ ] Anxiety  [ ] Delirium [ ] Agitation [ ] Other  HEENT:  [ x]Normal   [ ]Dry mouth   [ ]ET Tube/Trach  [ ]Oral lesions  PULMONARY:   [x ]Clear [ ]Tachypnea  [ ]Audible excessive secretions   [ ]Rhonchi        [ ]Right [ ]Left [ ]Bilateral  [ ]Crackles        [ ]Right [ ]Left [ ]Bilateral  [ ]Wheezing     [ ]Right [ ]Left [ ]Bilateral  [ ]Diminished breath sounds [ ]right [ ]left [ ]bilateral  CARDIOVASCULAR:    [ ]Regular [ ]Irregular [ ]Tachy  [ ]Per [ ]Murmur [ ]Other  GASTROINTESTINAL:  [ x]Soft  [ ]Distended   [ x]+BS  [ x]Non tender [ ]Tender  [ ]Other [ ]PEG [ ]OGT/ NGT  Last BM:  GENITOURINARY:  [x ]Normal [ ] Incontinent   [ ]Oliguria/Anuria   [ ]Marcial  MUSCULOSKELETAL:   [ x]Normal   [ ]Weakness  [ ]Bed/Wheelchair bound [ ]Edema  NEUROLOGIC:   [x ]No focal deficits  [ ]Cognitive impairment  [ ]Dysphagia [ ]Dysarthria [ ]Paresis [ ]Other   SKIN:   [ ]Normal  [ ]Rash  [ ]Other  [ ]Pressure ulcer(s)       Present on admission [ ]y [ ]n    CRITICAL CARE:  [ ]Shock Present  [ ]Septic [ ]Cardiogenic [ ]Neurologic [ ]Hypovolemic  [ ]Vasopressors [ ]Inotropes  [ ]Respiratory failure present [ ]Mechanical Ventilation [ ]Non-invasive ventilatory support [ ]High-Flow   [ ]Acute  [ ]Chronic [ ]Hypoxic  [ ]Hypercarbic [ ]Other  [ ]Other organ failure     LABS:                        13.2   7.97  )-----------( 114      ( 22 Jan 2025 18:20 )             41.4   01-22    137  |  101  |  11  ----------------------------<  143[H]  4.6   |  25  |  0.55    Ca    8.9      22 Jan 2025 18:20        Urinalysis Basic - ( 22 Jan 2025 18:20 )    Color: x / Appearance: x / SG: x / pH: x  Gluc: 143 mg/dL / Ketone: x  / Bili: x / Urobili: x   Blood: x / Protein: x / Nitrite: x   Leuk Esterase: x / RBC: x / WBC x   Sq Epi: x / Non Sq Epi: x / Bacteria: x      RADIOLOGY & ADDITIONAL STUDIES:    Protein Calorie Malnutrition Present: [ ]mild [ ]moderate [ ]severe [ ]underweight [ ]morbid obesity  https://www.andeal.org/vault/2440/web/files/ONC/Table_Clinical%20Characteristics%20to%20Document%20Malnutrition-White%20JV%20et%20al%202012.pdf    Height (cm): 165.1 (01-22-25 @ 08:56), 165.1 (01-14-25 @ 02:37), 165.1 (01-13-25 @ 19:43)  Weight (kg): 51.7 (01-22-25 @ 08:56), 53.5 (01-13-25 @ 19:43), 53 (12-20-24 @ 13:00)  BMI (kg/m2): 19 (01-22-25 @ 08:56), 19.6 (01-14-25 @ 02:37), 19.6 (01-13-25 @ 19:43)    [ ]PPSV2 < or = 30%  [ ]significant weight loss [ ]poor nutritional intake [ ]anasarca[ ]Artificial Nutrition    Other REFERRALS:  [ ]Hospice  [ ]Child Life  [ ]Social Work  [ ]Case management [ ]Holistic Therapy     Goals of Care Document: Indication for Geriatrics and Palliative Care Services/INTERVAL HPI:  SUBJECTIVE AND OBJECTIVE: Pt reporting 10/10 pain that has worsened overnight. Reporting no relief with oxy PRNs. Also reporting laying R side no longer providing relief. Sister at bedside voiced frustration that pain continues to be uncontrolled as well as concern that going up on opioids makes pt lethargic. Shared that unfortunately without appropriately escalating the opioid dose then pt will continue to report 10/10 pain; pt will need to make the decision regarding what's most important to her: having pain control with some lethargy or be active and engaging with uncontrolled pain. As far as pt has voiced to the team, pt is prioritizing pain control.     F/u'd pt an hour later to see if PRN she had received when first seen created any pain relief which pt denied continuing to report 10/10 pain. Ordered IV dilaudid 0.5mg x1 for severe pain.     Later in afternoon, sister voiced concern that pt had been sleeping and very lethargic since IV dilaudid dose. On exam, pt more lethargic than previous exams. Able to participate in conversation but intermittently closing eyes. Chart reviewed and pt had only received IV dilaudid 0.5mg x1 at 11am (3 hours prior). Pt shared she felt pain was better controlled with the dilaudid compared to previously with oxy PRNs however did voice she did not like the the side effect of sedation. Counseled on adequate pain control with SE of sedation vs being alert with uncontrolled pain to which pt shared she would rather be sedated with controlled pain. Pt also voiced she "feels like the cancer is taking over" and sister attempted to keep pt optimistic about cancer outcomes. Discussed starting low dose long-acting opioid with IV dialudid PRNs as well as scheduling family meeting with primary team and oncology for sometime next week; pt and sister in agreement. Pt voiced she would like sister to be involved in conversation.     OVERNIGHT EVENTS: Required oxy 15mg x4 in last 24h 7a-7a.     DNR on chart:  Allergies    Keflex (Other)    Intolerances    MEDICATIONS  (STANDING):  ALPRAZolam 2 milliGRAM(s) Oral at bedtime  capecitabine 1500 milliGRAM(s) Oral two times a day  cholecalciferol 2000 Unit(s) Oral daily  enoxaparin Injectable 40 milliGRAM(s) SubCutaneous every 24 hours  lactated ringers. 1000 milliLiter(s) (100 mL/Hr) IV Continuous <Continuous>  melatonin 3 milliGRAM(s) Oral once  oxyCODONE  ER Tablet 20 milliGRAM(s) Oral every 12 hours  polyethylene glycol 3350 17 Gram(s) Oral two times a day  senna 2 Tablet(s) Oral at bedtime  sertraline 100 milliGRAM(s) Oral daily    MEDICATIONS  (PRN):  ALPRAZolam 1 milliGRAM(s) Oral daily PRN anxiety  aluminum hydroxide/magnesium hydroxide/simethicone Suspension 30 milliLiter(s) Oral every 4 hours PRN Dyspepsia  HYDROmorphone  Injectable 0.5 milliGRAM(s) IV Push every 4 hours PRN breakthrough pain  magnesium hydroxide Suspension 30 milliLiter(s) Oral daily PRN Constipation  methocarbamol 500 milliGRAM(s) Oral two times a day PRN Muscle Spasm  naloxone Injectable 0.1 milliGRAM(s) IV Push every 3 minutes PRN overdose  ondansetron Injectable 4 milliGRAM(s) IV Push every 8 hours PRN Nausea and/or Vomiting  oxyCODONE    IR 20 milliGRAM(s) Oral every 4 hours PRN Moderate Pain (4 - 6)  oxyCODONE    IR 25 milliGRAM(s) Oral every 4 hours PRN Severe Pain (7 - 10)      ITEMS UNCHECKED ARE NOT PRESENT    PRESENT SYMPTOMS: [ ]Unable to self-report - see [ ] CPOT [ ] PAINADS [ ] RDOS  Source if other than patient:  [ ]Family   [ ]Team     Pain: [x ]yes [ ]no  QOL impact - unable to to function/do daily tasks  Location -   R lower back pain and RLQ pain                 Aggravating factors - movement and laying on L side  Quality - sharp  Radiation - non-radiating  Timing- constant  Severity (0-10 scale): 10/10  Minimal acceptable level (0-10 scale): 2-3/10    CPOT:    https://www.[facility].org/getattachment/[email or web address]-0j6f-7t7m-6380i5221y0y/Critical-Care-Pain-Observation-Tool-(CPOT)    Dyspnea:                           [ ]Mild [ ]Moderate [ ]Severe  Anxiety:                             [ ]Mild [ ]Moderate [ ]Severe  Fatigue:                             [ ]Mild [ ]Moderate [ ]Severe  Nausea:                             [ ]Mild [ ]Moderate [ ]Severe  Loss of appetite:              [ ]Mild [ ]Moderate [ ]Severe  Constipation:                    [ ]Mild [ ]Moderate [ ]Severe    PCSSQ[Palliative Care Spiritual Screening Question]   Severity (0-10):  Score of 4 or > indicate consideration of Chaplaincy referral.  Chaplaincy Referral: [x ] yes [ ] refused [ ] following [ ] Deferred     Caregiver [place]? : [ ] yes [ x] no [ ] Deferred [ ] Declined             Social work referral [ ] Patient & Family Centered Care Referral [ ]     Anticipatory Grief present?:  [x ] yes [ ] no  [ ] Deferred                  Social work referral [ x] Chaplaincy Referral[ x]      Other Symptoms:  [ x]All other review of systems negative   [ ]Unable to obtain due to poor mentation    Palliative Performance Status Version 2:    40     %      http://npcrc.org/files/news/palliative_performance_scale_ppsv2.pdf  PHYSICAL EXAM:  Vital Signs Last 24 Hrs  T(C): 36.7 (24 Jan 2025 11:36), Max: 36.7 (24 Jan 2025 11:36)  T(F): 98 (24 Jan 2025 11:36), Max: 98 (24 Jan 2025 11:36)  HR: 82 (24 Jan 2025 11:36) (75 - 82)  BP: 101/64 (24 Jan 2025 11:36) (101/64 - 109/74)  BP(mean): --  RR: 17 (24 Jan 2025 11:36) (17 - 17)  SpO2: 92% (24 Jan 2025 11:36) (91% - 93%)    Parameters below as of 24 Jan 2025 11:36  Patient On (Oxygen Delivery Method): room air     I&O's Summary    23 Jan 2025 07:01  -  24 Jan 2025 07:00  --------------------------------------------------------  IN: 580 mL / OUT: 0 mL / NET: 580 mL       GENERAL: [ ]Cachexia    [x ]Alert  [ x]Oriented x3   [ ]Lethargic  [ ]Unarousable  [ x]Verbal  [ ]Non-Verbal  Behavioral:   [ ] Anxiety  [ ] Delirium [ ] Agitation [ ] Other  HEENT:  [ x]Normal   [ ]Dry mouth   [ ]ET Tube/Trach  [ ]Oral lesions  PULMONARY:   [x ]Clear [ ]Tachypnea  [ ]Audible excessive secretions   [ ]Rhonchi        [ ]Right [ ]Left [ ]Bilateral  [ ]Crackles        [ ]Right [ ]Left [ ]Bilateral  [ ]Wheezing     [ ]Right [ ]Left [ ]Bilateral  [ ]Diminished breath sounds [ ]right [ ]left [ ]bilateral  CARDIOVASCULAR:    [ ]Regular [ ]Irregular [ ]Tachy  [ ]Per [ ]Murmur [ ]Other  GASTROINTESTINAL:  [ x]Soft  [ ]Distended   [ x]+BS  [ x]Non tender [ ]Tender  [ ]Other [ ]PEG [ ]OGT/ NGT  Last BM:  GENITOURINARY:  [x ]Normal [ ] Incontinent   [ ]Oliguria/Anuria   [ ]Marcial  MUSCULOSKELETAL:   [ x]Normal   [ ]Weakness  [ ]Bed/Wheelchair bound [ ]Edema  NEUROLOGIC:   [x ]No focal deficits  [ ]Cognitive impairment  [ ]Dysphagia [ ]Dysarthria [ ]Paresis [ ]Other   SKIN:   [ ]Normal  [ ]Rash  [ ]Other  [ ]Pressure ulcer(s)       Present on admission [ ]y [ ]n    CRITICAL CARE:  [ ]Shock Present  [ ]Septic [ ]Cardiogenic [ ]Neurologic [ ]Hypovolemic  [ ]Vasopressors [ ]Inotropes  [ ]Respiratory failure present [ ]Mechanical Ventilation [ ]Non-invasive ventilatory support [ ]High-Flow   [ ]Acute  [ ]Chronic [ ]Hypoxic  [ ]Hypercarbic [ ]Other  [ ]Other organ failure     LABS:                        13.2   7.97  )-----------( 114      ( 22 Jan 2025 18:20 )             41.4   01-22    137  |  101  |  11  ----------------------------<  143[H]  4.6   |  25  |  0.55    Ca    8.9      22 Jan 2025 18:20        Urinalysis Basic - ( 22 Jan 2025 18:20 )    Color: x / Appearance: x / SG: x / pH: x  Gluc: 143 mg/dL / Ketone: x  / Bili: x / Urobili: x   Blood: x / Protein: x / Nitrite: x   Leuk Esterase: x / RBC: x / WBC x   Sq Epi: x / Non Sq Epi: x / Bacteria: x      RADIOLOGY & ADDITIONAL STUDIES:    Protein Calorie Malnutrition Present: [ ]mild [ ]moderate [ ]severe [ ]underweight [ ]morbid obesity  https://www.andeal.org/vault/2440/web/files/ONC/Table_Clinical%20Characteristics%20to%20Document%20Malnutrition-White%20JV%20et%20al%568814.pdf    Height (cm): 165.1 (01-22-25 @ 08:56), 165.1 (01-14-25 @ 02:37), 165.1 (01-13-25 @ 19:43)  Weight (kg): 51.7 (01-22-25 @ 08:56), 53.5 (01-13-25 @ 19:43), 53 (12-20-24 @ 13:00)  BMI (kg/m2): 19 (01-22-25 @ 08:56), 19.6 (01-14-25 @ 02:37), 19.6 (01-13-25 @ 19:43)    [ ]PPSV2 < or = 30%  [ ]significant weight loss [ ]poor nutritional intake [ ]anasarca[ ]Artificial Nutrition    Other REFERRALS:  [ ]Hospice  [ ]Child Life  [ ]Social Work  [ ]Case management [ ]Holistic Therapy     Goals of Care Document:

## 2025-01-24 NOTE — DISCHARGE NOTE PROVIDER - DETAILS OF MALNUTRITION DIAGNOSIS/DIAGNOSES
This patient has been assessed with a concern for Malnutrition and was treated during this hospitalization for the following Nutrition diagnosis/diagnoses:     -  01/22/2025: Severe protein-calorie malnutrition   -  01/22/2025: Underweight (BMI < 19)

## 2025-01-24 NOTE — PROGRESS NOTE ADULT - PROBLEM SELECTOR PLAN 1
Follows with Dr. Booker outpatient. Reporting worsening 10/10 pain with no improvement from PRNs  - MRI Thoracic spine done outpatient on 1/17/25 shows- Partially treated diffuse osseous metastases. Largest residual enhancing osteolytic metastasis within the field of view arising within the posterior spinous processes T5.  - start oxy ER 20mg BID (started 1/24)  - increase oxy 10mg q4h PRN MP (0 PRNs required in last 24hr 7am-7am) to oxy 20mg q4h PRN MP  - increase oxy 15mg q4h PRN SP (4 PRNs required in last 24hr 7am-7am) to oxy 25mg q4h PRN SP  - start IV dilaudid 0.5mg q4h PRN breakthrough pain  - narcan PRN  - Bowel regimen while on opioids. Monitor for constipation.  - Suspect component of existential pain, chaplaincy and SW referrals made  - onc started Xeloda inpatient 1/23 Follows with Dr. Bokoer outpatient. Reporting worsening 10/10 pain with no improvement from PRNs  - Required oxy 15mg PRN x4 in last 24h 7a-7a  - pt very lethargic in afternoon, refer to above for more information   - MRI Thoracic spine done outpatient on 1/17/25 shows- Partially treated diffuse osseous metastases. Largest residual enhancing osteolytic metastasis within the field of view arising within the posterior spinous processes T5.  - start oxy ER 10mg BID (started 1/24)  - dc oxy PRNs  - start IV dialudid 0.2mg q4h PRN MP  - transition IV dilaudid 0.5mg q4h PRN breakthrough pain to IV dilaudid 0.5mg q4h PRN SP  - narcan PRN  - Bowel regimen while on opioids. Monitor for constipation.  - Suspect component of existential pain, chaplaincy and SW referrals made  - onc started Xeloda inpatient 1/23

## 2025-01-24 NOTE — DISCHARGE NOTE NURSING/CASE MANAGEMENT/SOCIAL WORK - INFLUENZA IMMUNIZATION DATE (APPROXIMATE):
01-Oct-2024
Severe episode of recurrent major depressive disorder, without psychotic features   F33.2

## 2025-01-24 NOTE — PROGRESS NOTE ADULT - PROBLEM SELECTOR PLAN 3
Follows with Dr. Obando at [facility]   - onc recommendations appreciated  - rad onc recommendations appreciated. - on miralax BID, senna 2 tab qhs, milk of magnesia   - Bowel regimen while on opioids. Monitor for constipation

## 2025-01-24 NOTE — DISCHARGE NOTE NURSING/CASE MANAGEMENT/SOCIAL WORK - PATIENT PORTAL LINK FT
You can access the FollowMyHealth Patient Portal offered by [facility] by registering at the following website: [email or web address]. By joining [employer]’s FollowMyHealth portal, you will also be able to view your health information using other applications (apps) compatible with our system.

## 2025-01-24 NOTE — PROGRESS NOTE ADULT - PROBLEM SELECTOR PLAN 2
- on miralax BID, senna 2 tab qhs, milk of magnesia   - Bowel regimen while on opioids. Monitor for constipation Pt lethargic in afternoon. Last opioid received was IV dilaudid 0.5mg x1 3 hours prior   - lower suspicion for opioid-related sedation given timing   - recommend CT head

## 2025-01-24 NOTE — PROGRESS NOTE ADULT - ASSESSMENT
50 y/o BRCA negative  F who had Stage II right invasive ductal carcinoma s/p ddACT followed by RT and had been on anastrozole since 2021. She had POD on 1/2024 and R iliac bone biopsy that is consistent with metastatic breast cancer to the bones and ER positive, CA negative, Her2 negative. She was on Faslodex and Truqap, however recent PET/CT 12/29/2024 which showed progression of disease.  Pt initially went to [facility] ED 1/14/25 endorsing same issue, had a CT scan performed (which showed diffuse osseous mets and innumerable hypodense ill-defined hepatic lesions which measure up  to 1.4 cm in the right hepatic lobe, concerning for metastases) and she was discharged once pain was controlled. She subsequently started capecitabine a day prior to presenting to [facility]. Pt now admitted to [facility] for intractable back pain for last few days. Oncology consulted for recommendations.    # Metastatic breast cancer  # Lower back pain, cancer related  - MRI Thoracic spine done outpatient on 1/17/25 shows- Partially treated diffuse osseous metastases. Largest residual enhancing osteolytic metastasis within the field of view arising within the posterior spinous processes T5. MRI Lumbar spine completed inpatient showing diffuse osseous metastatic disease.   - Appreciate palliative care recommendations for pain control. Would keep patient inpatient until pain is controlled to 2-3 out of 10.   - Appreciate final Radiation oncology recommendations now that MRI spine is complete. After review of CT spine, no plans for RT given extent of disease.  - Patient started capecitabine before hospital admission. Please continue capecitabine while inpatient and upon discharge. This will hopefully help control the pain but could take 4-6 weeks in the best circumstance.   - daily CBC w diff, CMP  - Will follow with Dr. Obando at [facility] upon hospital discharge    NOTE INCOMPLETE UNTIL ATTENDING SIGNS    ***************************************************************  Cheyenne Bonilla, PGY5  Fellow Hematology/Oncology  pager: [phone]   Available on Microsoft Teams  After 5pm or on weekends please contact  to page on-call fellow   ***************************************************************      50 y/o BRCA negative  F who had Stage II right invasive ductal carcinoma s/p ddACT followed by RT and had been on anastrozole since 2021. She had POD on 1/2024 and R iliac bone biopsy that is consistent with metastatic breast cancer to the bones and ER positive, FL negative, Her2 negative. She was on Faslodex and Truqap, however recent PET/CT 12/29/2024 which showed progression of disease.  Pt initially went to [facility] ED 1/14/25 endorsing same issue, had a CT scan performed (which showed diffuse osseous mets and innumerable hypodense ill-defined hepatic lesions which measure up  to 1.4 cm in the right hepatic lobe, concerning for metastases) and she was discharged once pain was controlled. She subsequently started capecitabine a day prior to presenting to [facility]. Pt now admitted to [facility] for intractable back pain for last few days. Oncology consulted for recommendations.    # Metastatic breast cancer  # Lower back pain, cancer related  - MRI Thoracic spine done outpatient on 1/17/25 shows- Partially treated diffuse osseous metastases. Largest residual enhancing osteolytic metastasis within the field of view arising within the posterior spinous processes T5. MRI Lumbar spine completed inpatient showing diffuse osseous metastatic disease.   - Appreciate palliative care recommendations for pain control. Would keep patient inpatient until pain is controlled to 2-3 out of 10.   - Appreciate final Radiation oncology recommendations now that MRI spine is complete. After review of CT spine, no plans for RT given extent of disease.  - Patient started capecitabine before hospital admission. Please continue capecitabine (3 pills BID) while inpatient and upon discharge. This will hopefully help control the pain but could take 4-6 weeks in the best circumstance. She has been tolerating this medication well thus far. Denies nausea, diarrhea.  - No contraindications for discharge by oncology once patient's pain is controlled and feels well enough to return home.  - daily CBC w diff, CMP  - Will follow with Dr. Obando at [facility] upon hospital discharge. Please advise team when patient is ready to be discharged.    NOTE INCOMPLETE UNTIL ATTENDING SIGNS    ***************************************************************  Cheyenne Bonilla, PGY5  Fellow Hematology/Oncology  pager: [phone]   Available on Microsoft Teams  After 5pm or on weekends please contact  to page on-call fellow   ***************************************************************      50 y/o BRCA negative  F who had Stage II right invasive ductal carcinoma s/p ddACT followed by RT and had been on anastrozole since 2021. She had POD on 1/2024 and R iliac bone biopsy that is consistent with metastatic breast cancer to the bones and ER positive, LA negative, Her2 negative. She was on Faslodex and Truqap, however recent PET/CT 12/29/2024 which showed progression of disease.  Pt initially went to [facility] ED 1/14/25 endorsing same issue, had a CT scan performed (which showed diffuse osseous mets and innumerable hypodense ill-defined hepatic lesions which measure up  to 1.4 cm in the right hepatic lobe, concerning for metastases) and she was discharged once pain was controlled. She subsequently started capecitabine a day prior to presenting to [facility]. Pt now admitted to [facility] for intractable back pain for last few days. Oncology consulted for recommendations.    # Metastatic breast cancer  # Lower back pain, cancer related  - MRI Thoracic spine done outpatient on 1/17/25 shows- Partially treated diffuse osseous metastases. Largest residual enhancing osteolytic metastasis within the field of view arising within the posterior spinous processes T5. MRI Lumbar spine completed inpatient showing diffuse osseous metastatic disease.   - Appreciate palliative care recommendations for pain control. Would keep patient inpatient until pain is controlled to 2-3 out of 10.   - Appreciate final Radiation oncology recommendations now that MRI spine is complete. After review of CT spine, no plans for RT given extent of disease.  - Patient started capecitabine before hospital admission. Please continue capecitabine (3 pills BID) while inpatient and upon discharge. This will hopefully help control the pain but could take 4-6 weeks in the best circumstance. She has been tolerating this medication well thus far. Denies nausea, diarrhea.  - No contraindications for discharge by oncology once patient's pain is controlled and feels well enough to return home.  - daily CBC w diff, CMP  - Will follow with Dr. Obando at [facility] upon hospital discharge. Please advise team when patient is ready to be discharged.    ***************************************************************  Cheyenne Bonilla, PGY5  Fellow Hematology/Oncology  pager: [phone]   Available on [employer] Teams  After 5pm or on weekends please contact  to page on-call fellow   ***************************************************************

## 2025-01-24 NOTE — PROGRESS NOTE ADULT - PROBLEM SELECTOR PLAN 6
In the event of worsening symptoms, please contact the Palliative Medicine team via pager (if the patient is at [facility] #[phone] or if the patient is at [facility] #[phone]) The Geriatric and Palliative Medicine service has coverage 24 hours a day/ 7 days a week to provide medical recommendations regarding symptom management needs via telephone.    Note incomplete until attested by attending.

## 2025-01-24 NOTE — PROGRESS NOTE ADULT - PROBLEM SELECTOR PLAN 4
Pt's goals are to get pain controlled enough to be able to f/u outpatient with onc for continued treatment  - discussed completing HCP and to think about who she would like to name. Provided pt with HCP paperwork to complete at her convenience  - discussed planning family meeting when pain is better controlled Pt's goals are to get pain controlled enough to be able to f/u outpatient with onc for continued treatment  - discussed completing HCP and to think about who she would like to name. Provided pt with HCP paperwork to complete at her convenience  - discussed planning family meeting when pain is better controlled, inpatient vs outpatient Follows with Dr. Obando at [facility]   - onc recommendations appreciated  - rad onc recommendations appreciated.

## 2025-01-24 NOTE — PROGRESS NOTE ADULT - PROBLEM SELECTOR PLAN 5
In the event of worsening symptoms, please contact the Palliative Medicine team via pager (if the patient is at [facility] #[phone] or if the patient is at [facility] #[phone]) The Geriatric and Palliative Medicine service has coverage 24 hours a day/ 7 days a week to provide medical recommendations regarding symptom management needs via telephone.    Note incomplete until attested by attending. Pt's goals are to get pain controlled enough to be able to f/u outpatient with onc for continued treatment  - discussed completing HCP and to think about who she would like to name. Provided pt with HCP paperwork to complete at her convenience  - discussed planning family meeting for next week with primary team and oncology, will f/u scheduling. Pt shared she wants sister involved in conversation

## 2025-01-24 NOTE — DISCHARGE NOTE NURSING/CASE MANAGEMENT/SOCIAL WORK - FINANCIAL ASSISTANCE
[facility] provides services at a reduced cost to those who are determined to be eligible through [facility]’s financial assistance program. Information regarding [facility]’s financial assistance program can be found by going to [email or web address] or by calling [phone].

## 2025-01-24 NOTE — PROGRESS NOTE ADULT - SUBJECTIVE AND OBJECTIVE BOX
Nadir Mcintosh MD  Division of Hospital Medicine  Available on MS teams until 7pm  If no response or off-hours, page [phone]  -------------------------------------    Patient is a 49y old  Female who presents with a chief complaint of intractable pain (24 Jan 2025 12:45)    SUBJECTIVE / OVERNIGHT EVENTS: none acute  ADDITIONAL REVIEW OF SYSTEMS: pt notes feeling more lethargic today as well as dehydrated and with increased pain compared to yesterday.     MEDICATIONS  (STANDING):  ALPRAZolam 2 milliGRAM(s) Oral at bedtime  capecitabine 1500 milliGRAM(s) Oral two times a day  cholecalciferol 2000 Unit(s) Oral daily  enoxaparin Injectable 40 milliGRAM(s) SubCutaneous every 24 hours  melatonin 3 milliGRAM(s) Oral once  oxyCODONE  ER Tablet 10 milliGRAM(s) Oral every 12 hours  polyethylene glycol 3350 17 Gram(s) Oral two times a day  senna 2 Tablet(s) Oral at bedtime  sertraline 100 milliGRAM(s) Oral daily    MEDICATIONS  (PRN):  ALPRAZolam 1 milliGRAM(s) Oral daily PRN anxiety  aluminum hydroxide/magnesium hydroxide/simethicone Suspension 30 milliLiter(s) Oral every 4 hours PRN Dyspepsia  HYDROmorphone  Injectable 0.5 milliGRAM(s) IV Push every 4 hours PRN breakthrough pain  HYDROmorphone  Injectable 0.2 milliGRAM(s) IV Push every 4 hours PRN Moderate Pain (4 - 6)  HYDROmorphone  Injectable 0.5 milliGRAM(s) IV Push every 4 hours PRN Severe Pain (7 - 10)  magnesium hydroxide Suspension 30 milliLiter(s) Oral daily PRN Constipation  methocarbamol 500 milliGRAM(s) Oral two times a day PRN Muscle Spasm  naloxone Injectable 0.1 milliGRAM(s) IV Push every 3 minutes PRN overdose  ondansetron Injectable 4 milliGRAM(s) IV Push every 8 hours PRN Nausea and/or Vomiting      CAPILLARY BLOOD GLUCOSE        I&O's Summary    23 Jan 2025 07:01  -  24 Jan 2025 07:00  --------------------------------------------------------  IN: 580 mL / OUT: 0 mL / NET: 580 mL        PHYSICAL EXAM:  Vital Signs Last 24 Hrs  T(C): 36.7 (24 Jan 2025 11:36), Max: 36.7 (24 Jan 2025 11:36)  T(F): 98 (24 Jan 2025 11:36), Max: 98 (24 Jan 2025 11:36)  HR: 82 (24 Jan 2025 11:36) (75 - 82)  BP: 101/64 (24 Jan 2025 11:36) (101/64 - 109/74)  BP(mean): --  RR: 17 (24 Jan 2025 11:36) (17 - 17)  SpO2: 92% (24 Jan 2025 11:36) (92% - 93%)    Parameters below as of 24 Jan 2025 11:36  Patient On (Oxygen Delivery Method): room air      CONSTITUTIONAL: somnolent but arousable  EYES: PERRLA; conjunctiva and sclera clear  ENMT: dry MM  NECK: Supple  RESPIRATORY: Normal respiratory effort; CTAB  CARDIOVASCULAR: RRR, no JVD, no peripheral edema   ABDOMEN: Nontender to palpation, normoactive BS, no guarding/rigidity  MUSCLOSKELETAL:  no clubbing/cyanosis, no joint swelling or tenderness to palpation  PSYCH: A+O x 3, affect normal  NEUROLOGY: CN 2-12 are intact and symmetric; no gross sensory or motor deficits  SKIN: No rashes; no palpable lesions    LABS:                        13.2   7.97  )-----------( 114      ( 22 Jan 2025 18:20 )             41.4     01-22    137  |  101  |  11  ----------------------------<  143[H]  4.6   |  25  |  0.55    Ca    8.9      22 Jan 2025 18:20            Urinalysis Basic - ( 22 Jan 2025 18:20 )    Color: x / Appearance: x / SG: x / pH: x  Gluc: 143 mg/dL / Ketone: x  / Bili: x / Urobili: x   Blood: x / Protein: x / Nitrite: x   Leuk Esterase: x / RBC: x / WBC x   Sq Epi: x / Non Sq Epi: x / Bacteria: x          RADIOLOGY & ADDITIONAL TESTS:  Results Reviewed:   Imaging Personally Reviewed:  Electrocardiogram Personally Reviewed:    COORDINATION OF CARE:  Care Discussed with Consultants/Other Providers [Y/N]: palliative  Prior or Outpatient Records Reviewed [Y/N]:

## 2025-01-24 NOTE — CHART NOTE - NSCHARTNOTEFT_GEN_A_CORE
Due to metastatic breast cancer with diffuse spinal involvement, patient has a severe mobility limitation and requires a transport wheelchair to assist in daily ADL's. Patient cannot ambulate safely with a cane or a walker. Patient does not have sufficient upper body strength to self propel a standard wheelchair. Patient has a caregiver to assist with maneuvering the wheelchair. pt has not expressed an unwillingness to use the wheelchair. Patient has amble room in the home for said wheelchair. Use of a transport wheelchair will significantly improve the patients ability to participate in daily ADL's and the patient will use it on a regular basis in the home. Patient will require a transport wheelchair due metastatic breast cancer involving diffuse spine. The beneficiary has a mobility limitation that significantly impairs his ability to participate in one or more MRADLs such as toileting, feeding, dressing, grooming, and bathing in customary locations in the home. The patient’s mobility limitation cannot be sufficiently resolved by the use of an appropriately fitted cane or walker. The patient is unable to ambulate with a walker. Use of a transport wheelchair will significantly improve the beneficiary’s ability to participate in MRADLs and the beneficiary will use it on a regular basis in the home. The beneficiary is able and willing to use the wheelchair in the home. The beneficiary has a caregiver who is available, willing, and able to provide assistance with the wheelchair. The patient is not able to self propel a standard or lightweight wheelchair

## 2025-01-24 NOTE — PROGRESS NOTE ADULT - SUBJECTIVE AND OBJECTIVE BOX
INTERVAL HPI/OVERNIGHT EVENTS:  No overnight events.     MEDICATIONS  (STANDING):  ALPRAZolam 2 milliGRAM(s) Oral at bedtime  capecitabine 1500 milliGRAM(s) Oral two times a day  cholecalciferol 2000 Unit(s) Oral daily  enoxaparin Injectable 40 milliGRAM(s) SubCutaneous every 24 hours  lactated ringers. 1000 milliLiter(s) (100 mL/Hr) IV Continuous <Continuous>  melatonin 3 milliGRAM(s) Oral once  oxyCODONE  ER Tablet 20 milliGRAM(s) Oral every 12 hours  polyethylene glycol 3350 17 Gram(s) Oral two times a day  senna 2 Tablet(s) Oral at bedtime  sertraline 100 milliGRAM(s) Oral daily    MEDICATIONS  (PRN):  ALPRAZolam 1 milliGRAM(s) Oral daily PRN anxiety  aluminum hydroxide/magnesium hydroxide/simethicone Suspension 30 milliLiter(s) Oral every 4 hours PRN Dyspepsia  magnesium hydroxide Suspension 30 milliLiter(s) Oral daily PRN Constipation  methocarbamol 500 milliGRAM(s) Oral two times a day PRN Muscle Spasm  naloxone Injectable 0.1 milliGRAM(s) IV Push every 3 minutes PRN overdose  ondansetron Injectable 4 milliGRAM(s) IV Push every 8 hours PRN Nausea and/or Vomiting  oxyCODONE    IR 10 milliGRAM(s) Oral every 4 hours PRN Moderate Pain (4 - 6)  oxyCODONE    IR 15 milliGRAM(s) Oral every 4 hours PRN Severe Pain (7 - 10)    Allergies    Keflex (Other)    Intolerances          VITAL SIGNS:  T(F): 97.5 (01-24-25 @ 06:36)  HR: 75 (01-24-25 @ 06:36)  BP: 104/65 (01-24-25 @ 06:36)  RR: 17 (01-24-25 @ 06:38)  SpO2: 93% (01-24-25 @ 06:38)  Wt(kg): --    PHYSICAL EXAM:    CONSTITUTIONAL: NAD  EYES: PERRLA; conjunctiva and sclera clear  ENMT: MMM  NECK: Supple  RESPIRATORY: Normal respiratory effort; CTAB  CARDIOVASCULAR: RRR, no JVD, no peripheral edema   ABDOMEN: Nontender to palpation, normoactive BS, no guarding/rigidity  MUSCLOSKELETAL:  no clubbing/cyanosis, no joint swelling or tenderness to palpation  PSYCH: A+O x 3, affect normal  NEUROLOGY: CN 2-12 are intact and symmetric; no gross sensory or motor deficits  SKIN: No rashes; no palpable lesions      LABS:                        13.2   7.97  )-----------( 114      ( 22 Jan 2025 18:20 )             41.4     01-22    137  |  101  |  11  ----------------------------<  143[H]  4.6   |  25  |  0.55    Ca    8.9      22 Jan 2025 18:20        Urinalysis Basic - ( 22 Jan 2025 18:20 )    Color: x / Appearance: x / SG: x / pH: x  Gluc: 143 mg/dL / Ketone: x  / Bili: x / Urobili: x   Blood: x / Protein: x / Nitrite: x   Leuk Esterase: x / RBC: x / WBC x   Sq Epi: x / Non Sq Epi: x / Bacteria: x        RADIOLOGY & ADDITIONAL TESTS:  Studies reviewed.   INTERVAL HPI/OVERNIGHT EVENTS:  No overnight events. Patient has pain controlled but is very drowsy from the pain medication. Tolerating capecitabine.     MEDICATIONS  (STANDING):  ALPRAZolam 2 milliGRAM(s) Oral at bedtime  capecitabine 1500 milliGRAM(s) Oral two times a day  cholecalciferol 2000 Unit(s) Oral daily  enoxaparin Injectable 40 milliGRAM(s) SubCutaneous every 24 hours  lactated ringers. 1000 milliLiter(s) (100 mL/Hr) IV Continuous <Continuous>  melatonin 3 milliGRAM(s) Oral once  oxyCODONE  ER Tablet 20 milliGRAM(s) Oral every 12 hours  polyethylene glycol 3350 17 Gram(s) Oral two times a day  senna 2 Tablet(s) Oral at bedtime  sertraline 100 milliGRAM(s) Oral daily    MEDICATIONS  (PRN):  ALPRAZolam 1 milliGRAM(s) Oral daily PRN anxiety  aluminum hydroxide/magnesium hydroxide/simethicone Suspension 30 milliLiter(s) Oral every 4 hours PRN Dyspepsia  magnesium hydroxide Suspension 30 milliLiter(s) Oral daily PRN Constipation  methocarbamol 500 milliGRAM(s) Oral two times a day PRN Muscle Spasm  naloxone Injectable 0.1 milliGRAM(s) IV Push every 3 minutes PRN overdose  ondansetron Injectable 4 milliGRAM(s) IV Push every 8 hours PRN Nausea and/or Vomiting  oxyCODONE    IR 10 milliGRAM(s) Oral every 4 hours PRN Moderate Pain (4 - 6)  oxyCODONE    IR 15 milliGRAM(s) Oral every 4 hours PRN Severe Pain (7 - 10)    Allergies    Keflex (Other)    Intolerances          VITAL SIGNS:  T(F): 97.5 (01-24-25 @ 06:36)  HR: 75 (01-24-25 @ 06:36)  BP: 104/65 (01-24-25 @ 06:36)  RR: 17 (01-24-25 @ 06:38)  SpO2: 93% (01-24-25 @ 06:38)  Wt(kg): --    PHYSICAL EXAM:    CONSTITUTIONAL: NAD  EYES: PERRLA; conjunctiva and sclera clear  ENMT: MMM  NECK: Supple  RESPIRATORY: Normal respiratory effort; CTAB  CARDIOVASCULAR: RRR, no JVD, no peripheral edema   ABDOMEN: Nontender to palpation, normoactive BS, no guarding/rigidity  MUSCLOSKELETAL:  no clubbing/cyanosis, no joint swelling or tenderness to palpation  PSYCH: A+O x 3, affect normal  NEUROLOGY: CN 2-12 are intact and symmetric; no gross sensory or motor deficits  SKIN: No rashes; no palpable lesions      LABS:                        13.2   7.97  )-----------( 114      ( 22 Jan 2025 18:20 )             41.4     01-22    137  |  101  |  11  ----------------------------<  143[H]  4.6   |  25  |  0.55    Ca    8.9      22 Jan 2025 18:20        Urinalysis Basic - ( 22 Jan 2025 18:20 )    Color: x / Appearance: x / SG: x / pH: x  Gluc: 143 mg/dL / Ketone: x  / Bili: x / Urobili: x   Blood: x / Protein: x / Nitrite: x   Leuk Esterase: x / RBC: x / WBC x   Sq Epi: x / Non Sq Epi: x / Bacteria: x        RADIOLOGY & ADDITIONAL TESTS:  Studies reviewed.

## 2025-01-24 NOTE — CHART NOTE - NSCHARTNOTEFT_GEN_A_CORE
MARLEEN and LCSW continue to follow this case. LCSW attempted to meet with patient and sister today at bedside for supportive intervention, but patient with lethargy at time of visit and notes not feeling well or like herself. Patient and sister note that patient's sleepiness has continued to worsen with each day and note that pain remains an ongoing issue. LCSW informed [facility] attending and fellow who followed up with patient and discussed changes to be made to regimen, all questions answered today. Team did discuss possibly arranging a family meeting in the coming days with MARLEEN, IM, and Onc to better discuss status and next steps. Patient notes interest in having this meeting and states that she feels her cancer is a large contributor to her current status changes. Team assured ongoing availability and follow up, and LCSW placed requested resources at bedside for consideration and review when patient feels able.    MARLEEN and ROGERW will continue to follow this case.

## 2025-01-24 NOTE — DISCHARGE NOTE PROVIDER - NSDCMRMEDTOKEN_GEN_ALL_CORE_FT
capecitabine 500 mg oral tablet: 3 tab(s) orally 2 times a day 1 week on and 1 week off  Faslodex 50 mg/mL intramuscular solution: 500 unit(s) intramuscularly every 4 weeks  rehab at home: .  sertraline 100 mg oral tablet: 1 tab(s) orally once a day  Transport wheelchair: use as directed  Vitamin D Tablet: 1 tablet orally once a day  Xanax 2 mg oral tablet: 0.5 tablet orally in the morning and 1.5 tablets orally before bedtime  Xgeva 120 mg/1.7 mL subcutaneous solution: 120 milligram(s) subcutaneously every 4 weeks

## 2025-01-24 NOTE — PROGRESS NOTE ADULT - PROBLEM SELECTOR PLAN 1
intractable.  Anticipate fentanyl patch for basal control  dilaudid dosing per palliative, stop IV dilaudid  agreeable to trialing robaxin instead of tizanidine  xanax as prescribed at home 1mg qam, 3mg qhs  emailed rad onc for consult- no clear localized target for therapy, will f/u MR L/S spine, recommend systemic palliative chemo  MR L/S spine showing innumerable lesions without any fractures- ok to work with PT and get OOBC    #toxic metabolic encephalopathy- noted to be more lethargic 1/24, out of proportion to what would be expected from receiving IV dilaudid. ddx includes possible hyperammonemia vs. dehydration related to xeloda vs. must r/o brain bleed/hemorrhagic mets.  - f/u CTH non con  - IVF bolus  - check ammonia, consider starting lactulose

## 2025-01-25 NOTE — PROGRESS NOTE ADULT - PROBLEM SELECTOR PLAN 1
intractable.  Anticipate fentanyl patch for basal control  dilaudid dosing per palliative, stop IV dilaudid  agreeable to trialing robaxin instead of tizanidine  xanax as prescribed at home 1mg qam, 3mg qhs  emailed rad onc for consult- no clear localized target for therapy, will f/u MR L/S spine, recommend systemic palliative chemo  MR L/S spine showing innumerable lesions without any fractures- ok to work with PT and get OOBC    #toxic metabolic encephalopathy- noted to be more lethargic 1/24, out of proportion to what would be expected from receiving IV dilaudid. ddx includes possible hyperammonemia vs. dehydration related to xeloda vs. must r/o brain bleed/hemorrhagic mets.  - f/u CTH non con read  - IVF bolus  - mild decreased in ammonia- increase lactulose to QID- can decrease frequency in next couple days if improves  - cont trending ammonia

## 2025-01-25 NOTE — CHART NOTE - NSCHARTNOTEFT_GEN_A_CORE
Updated by hospitalist on patient's current clinical course including progressing transaminitis and bilirubinemia, with plans to pursue MRCP + advanced GI evaluation re possible stenting of biliary tree. Although this medication has been associated with transaminitis/bilirubinemia, patient's abdominal symptoms and transaminitis onset started prior to starting capecitabine. Pt's scans and history reviewed, significant metastatic tumor burden in liver known.    Recommendations:  - Continue Capecitabine while inpatient  - Agree with MRCP + Advanced GI evaluation  - Palliative following, appreciate any additional reccs    Updated patient's primary oncology, Dr. Neymar Obando, as well of above.  Discussed with on-call heme/onc attending, Dr. Emmanuel.    Please message me on MS teams with any additional questions.    Farhad Timmons, PGY4  Hematology & Oncology Fellow  MS Teams - Call or Text Updated by hospitalist on patient's current clinical course including progressing transaminitis and bilirubinemia, with plans to pursue MRCP + advanced GI evaluation re possible stenting of biliary tree. Although this medication has been associated with transaminitis/bilirubinemia, patient's abdominal symptoms and transaminitis onset started prior to starting capecitabine. Pt's scans and history reviewed, significant metastatic tumor burden in liver known.    Recommendations:  - Continue Capecitabine while inpatient  - Agree with MRCP + Advanced GI evaluation  - Palliative following, appreciate any additional reccs    Capecitabine and any future cancer treatment is being given with palliative intent, and outpatient palliative care has been established.    Updated patient's primary oncology, Dr. Neymar Obando, as well of above.  Discussed with on-call heme/onc attending, Dr. Emmanuel.    Please message me on MS teams with any additional questions.    Farhad Timmons, PGY4  Hematology & Oncology Fellow  MS Teams - Call or Text Updated by hospitalist on patient's current clinical course including progressing transaminitis and bilirubinemia, with plans to pursue MRCP + advanced GI evaluation re possible stenting of biliary tree. Although this medication has been associated with transaminitis/bilirubinemia, patient's abdominal symptoms and transaminitis onset started prior to starting capecitabine. Pt's scans and history reviewed, significant metastatic tumor burden in liver known.    Recommendations:  - Continue Capecitabine while inpatient  - Agree with MRCP + Advanced GI evaluation  - Palliative following, appreciate any additional reccs    Capecitabine and any future cancer treatment is being given with palliative intent, and outpatient palliative care has been established.    Updated patient's primary oncology, Dr. Neymar Obando, as well of above.  Discussed with on-call heme/onc attending, Dr. Emmanuel.    Please message me on MS teams with any additional questions.    Farhad Timmons, PGY4  Hematology & Oncology Fellow  MS Teams - Call or Text    This note is reviewed and the plan of care for the patient on oral capecitabine for cancer care discussed.  Oscar Emmanuel MD attending

## 2025-01-25 NOTE — PROGRESS NOTE ADULT - PROBLEM SELECTOR PLAN 2
Mets to liver as well. appreciate oncology c/s  f/u recs    #hyperbilirubinemia- likely 2/2 liver mets. Per discussion with oncology, less likely 2/2 xeloda  - MRCP ordered- will need to be with anesthesia  - GI preemptively emailed to see if further imaging can be obviated and pt taken for stenting  - cont monitoring cmp daily Mets to liver as well. appreciate oncology c/s  f/u recs    #hyperbilirubinemia- likely 2/2 liver mets. Per discussion with oncology, less likely 2/2 xeloda  - per discussion with med/onc pt unlikely experiencing xeloda AE and in fact should continue it to try and improve disease burden. Cont monitoring on xeloda.   - MRCP ordered- will need to be with anesthesia  - GI preemptively emailed to see if further imaging can be obviated and pt taken for stenting  - cont monitoring cmp daily

## 2025-01-25 NOTE — PROGRESS NOTE ADULT - PROBLEM SELECTOR PLAN 5
due to known liver mets  trend LFTS    dispo: pending MRCP/possible ERCP, pain control, improvement in HE, further GOC  overall poor prognosis, but patient opting to continue medical treatment and hopeful she will regain enough functioning to be discharged home  plan d/w and agreed on by pt and sister via phone, friend Jp at bedside

## 2025-01-25 NOTE — PROGRESS NOTE ADULT - SUBJECTIVE AND OBJECTIVE BOX
Nadir Mcintosh MD  Division of Hospital Medicine  Available on MS teams until 7pm  If no response or off-hours, page [phone]  -------------------------------------    Patient is a 49y old  Female who presents with a chief complaint of intractable pain (24 Jan 2025 15:14)      SUBJECTIVE / OVERNIGHT EVENTS: none acute  ADDITIONAL REVIEW OF SYSTEMS: pt feels ongoing malaise, fatigue and sleepiness, also notes no major improvement in pain. Struggling to drink lactulose but had one dose yesterday. Feels like giving up and dying.     MEDICATIONS  (STANDING):  ALPRAZolam 2 milliGRAM(s) Oral at bedtime  capecitabine 1500 milliGRAM(s) Oral two times a day  cholecalciferol 2000 Unit(s) Oral daily  dextrose 5% + sodium chloride 0.45%. 1000 milliLiter(s) (85 mL/Hr) IV Continuous <Continuous>  enoxaparin Injectable 40 milliGRAM(s) SubCutaneous every 24 hours  lactulose Syrup 20 Gram(s) Oral four times a day  melatonin 3 milliGRAM(s) Oral once  oxyCODONE  ER Tablet 10 milliGRAM(s) Oral every 12 hours  polyethylene glycol 3350 17 Gram(s) Oral two times a day  senna 2 Tablet(s) Oral at bedtime  sertraline 100 milliGRAM(s) Oral daily    MEDICATIONS  (PRN):  ALPRAZolam 1 milliGRAM(s) Oral daily PRN anxiety  aluminum hydroxide/magnesium hydroxide/simethicone Suspension 30 milliLiter(s) Oral every 4 hours PRN Dyspepsia  bisacodyl Suppository 10 milliGRAM(s) Rectal daily PRN Constipation  HYDROmorphone  Injectable 0.5 milliGRAM(s) IV Push every 4 hours PRN breakthrough pain  HYDROmorphone  Injectable 0.2 milliGRAM(s) IV Push every 4 hours PRN Moderate Pain (4 - 6)  HYDROmorphone  Injectable 0.5 milliGRAM(s) IV Push every 4 hours PRN Severe Pain (7 - 10)  magnesium hydroxide Suspension 30 milliLiter(s) Oral daily PRN Constipation  methocarbamol 500 milliGRAM(s) Oral two times a day PRN Muscle Spasm  naloxone Injectable 0.1 milliGRAM(s) IV Push every 3 minutes PRN overdose  ondansetron Injectable 4 milliGRAM(s) IV Push every 8 hours PRN Nausea and/or Vomiting      CAPILLARY BLOOD GLUCOSE        I&O's Summary      PHYSICAL EXAM:  Vital Signs Last 24 Hrs  T(C): 36.4 (25 Jan 2025 05:29), Max: 36.6 (24 Jan 2025 22:41)  T(F): 97.6 (25 Jan 2025 05:29), Max: 97.8 (24 Jan 2025 22:41)  HR: 87 (25 Jan 2025 05:29) (84 - 87)  BP: 124/83 (25 Jan 2025 05:29) (124/77 - 124/83)  BP(mean): --  RR: 18 (25 Jan 2025 05:29) (18 - 18)  SpO2: 100% (25 Jan 2025 05:29) (98% - 100%)    Parameters below as of 25 Jan 2025 05:29  Patient On (Oxygen Delivery Method): nasal cannula  O2 Flow (L/min): 2    CONSTITUTIONAL: NAD  EYES: PERRLA; conjunctiva and sclera mildly icteric  ENMT: MMM  NECK: Supple  RESPIRATORY: Normal respiratory effort; CTAB  CARDIOVASCULAR: RRR, no JVD, no peripheral edema   ABDOMEN: Nontender to palpation, normoactive BS, no guarding/rigidity  MUSCLOSKELETAL:  no clubbing/cyanosis, no joint swelling or tenderness to palpation  PSYCH: A+O x 3, affect normal  NEUROLOGY: CN 2-12 are intact and symmetric; no gross sensory or motor deficits  SKIN: mild jaundice    LABS:                        13.2   10.48 )-----------( 100      ( 25 Jan 2025 07:13 )             40.3     01-25    133[L]  |  98  |  13  ----------------------------<  139[H]  3.9   |  25  |  0.55    Ca    9.2      25 Jan 2025 07:13  Phos  1.8     01-25  Mg     2.3     01-25    TPro  5.5[L]  /  Alb  2.9[L]  /  TBili  4.8[H]  /  DBili  x   /  AST  468[H]  /  ALT  140[H]  /  AlkPhos  303[H]  01-25          Urinalysis Basic - ( 25 Jan 2025 07:13 )    Color: x / Appearance: x / SG: x / pH: x  Gluc: 139 mg/dL / Ketone: x  / Bili: x / Urobili: x   Blood: x / Protein: x / Nitrite: x   Leuk Esterase: x / RBC: x / WBC x   Sq Epi: x / Non Sq Epi: x / Bacteria: x          RADIOLOGY & ADDITIONAL TESTS:  Results Reviewed:   Imaging Personally Reviewed:  Electrocardiogram Personally Reviewed:    COORDINATION OF CARE:  Care Discussed with Consultants/Other Providers [Y/N]: med onc  Prior or Outpatient Records Reviewed [Y/N]:

## 2025-01-26 NOTE — PROGRESS NOTE ADULT - PROBLEM SELECTOR PLAN 1
- per rad onc- no clear localized target for therapy, will f/u MR L/S spine, recommend systemic palliative chemo  - MR L/S spine showing innumerable lesions without any fractures- ok to work with PT and get OOBC  - palliative care team onboard, appreciate recommendations  - c/w Oxycontin 10mg q12hr  - c/w xanax as prescribed at home 1mg qam, 3mg qhs  - robaxin 500mg BID PRN for muscle spasms  - c/w PRN IV dilaudid 0.2mg q4hr PRN for moderate pain, 0.5mg q4hr PRN for severe pain, 0.5mg PRN q4hr for breakthrough pain

## 2025-01-26 NOTE — PROGRESS NOTE ADULT - PROBLEM SELECTOR PLAN 3
#toxic metabolic encephalopathy- noted to be more lethargic 1/24, out of proportion to what would be expected from receiving IV dilaudid. ddx includes possible hyperammonemia vs. dehydration related to xeloda vs. must r/o brain bleed/hemorrhagic mets.  - CTH non con : no SDH, massess, or midline shift  - IVF bolus  - ammonia levels improved  - holding off on further lactulose   - mental status improved as of 1/26, awake, AAOx3

## 2025-01-26 NOTE — PROGRESS NOTE ADULT - PROBLEM SELECTOR PLAN 6
#hyperbilirubinemia- likely 2/2 liver mets. Per discussion with oncology, less likely 2/2 xeloda  - per discussion with med/onc pt unlikely experiencing xeloda AE and in fact should continue it to try and improve disease burden. Cont monitoring on xeloda.   - MRCP on hold. If pt needs it later on, will need to be with anesthesia  - GI onboard, recommending CT a/p with IV contrast   - cont monitoring cmp daily

## 2025-01-26 NOTE — CONSULT NOTE ADULT - SUBJECTIVE AND OBJECTIVE BOX
Initial GI Consult    Patient is a 49y old  Female who presents with a chief complaint of intractable pain    HPI: 50yo F BRCA Stage II right invasive ductal carcinoma s/p ddACT followed by RT and had been on anastrozole since 2021, extensive osseous and liver mets, seen by palliative care 1/17 prescribed trial of oxycodone, tramadol, and tizanidine without relief presenting to the ER on 1/20 with acute on chronic abdominal pain. Per the family who is at bedside, the patient always has pain but over the last week the pain increased in her RUQ and she now has loss of appetite and increasing fatigue. She also reports constipation.     PAST MEDICAL & SURGICAL HISTORY:  IBS (irritable bowel syndrome)      History of cholecystectomy      History of myomectomy      Status post excision of fibroadenoma of breast        FAMILY HISTORY:  FH: stomach cancer (Mother)        MEDS:  MEDICATIONS  (STANDING):  ALPRAZolam 2 milliGRAM(s) Oral at bedtime  capecitabine 1500 milliGRAM(s) Oral two times a day  cholecalciferol 2000 Unit(s) Oral daily  dextrose 5% + sodium chloride 0.9%. 1000 milliLiter(s) (85 mL/Hr) IV Continuous <Continuous>  enoxaparin Injectable 40 milliGRAM(s) SubCutaneous every 24 hours  melatonin 3 milliGRAM(s) Oral once  oxyCODONE  ER Tablet 10 milliGRAM(s) Oral every 12 hours  polyethylene glycol 3350 17 Gram(s) Oral two times a day  senna 2 Tablet(s) Oral at bedtime  sertraline 100 milliGRAM(s) Oral daily    MEDICATIONS  (PRN):  ALPRAZolam 1 milliGRAM(s) Oral daily PRN anxiety  aluminum hydroxide/magnesium hydroxide/simethicone Suspension 30 milliLiter(s) Oral every 4 hours PRN Dyspepsia  bisacodyl Suppository 10 milliGRAM(s) Rectal daily PRN Constipation  HYDROmorphone  Injectable 0.5 milliGRAM(s) IV Push every 4 hours PRN breakthrough pain  HYDROmorphone  Injectable 0.2 milliGRAM(s) IV Push every 4 hours PRN Moderate Pain (4 - 6)  HYDROmorphone  Injectable 0.5 milliGRAM(s) IV Push every 4 hours PRN Severe Pain (7 - 10)  magnesium hydroxide Suspension 30 milliLiter(s) Oral daily PRN Constipation  methocarbamol 500 milliGRAM(s) Oral two times a day PRN Muscle Spasm  naloxone Injectable 0.1 milliGRAM(s) IV Push every 3 minutes PRN overdose  ondansetron Injectable 4 milliGRAM(s) IV Push every 8 hours PRN Nausea and/or Vomiting    Allergies    Keflex (Other)    Intolerance    ROS: As per HPI    ______________________________________________________________________  PHYSICAL EXAM:  T(C): 36.4 (01-26-25 @ 05:09), Max: 36.6 (01-25-25 @ 14:58)  HR: 99 (01-26-25 @ 05:09)  BP: 107/73 (01-26-25 @ 05:09)  RR: 18 (01-26-25 @ 05:09)  SpO2: 93% (01-26-25 @ 05:09)  Wt(kg): --    01-25 - 01-26  --------------------------------------------------------  IN:    Oral Fluid: 150 mL  Total IN: 150 mL    OUT:  Total OUT: 0 mL    Total NET: 150 mL          GEN: NAD, normocephalic  CVS: S1S2+  CHEST: clear to auscultation  ABD: soft , RUQ tenderness, distended, bowel sounds present  EXTR: no cyanosis, no clubbing, no edema  NEURO: Lethargic, oriented x3  SKIN:  warm;  mild jaundice     ______________________________________________________________________  LABS:                        11.7   10.51 )-----------( 70       ( 26 Jan 2025 07:06 )             37.0     01-25    133[L]  |  98  |  13  ----------------------------<  139[H]  3.9   |  25  |  0.55    Ca    9.2      25 Jan 2025 07:13  Phos  1.8     01-25  Mg     2.3     01-25    TPro  5.5[L]  /  Alb  2.9[L]  /  TBili  4.8[H]  /  DBili  x   /  AST  468[H]  /  ALT  140[H]  /  AlkPhos  303[H]  01-25    LIVER FUNCTIONS - ( 25 Jan 2025 07:13 )  Alb: 2.9 g/dL / Pro: 5.5 g/dL / ALK PHOS: 303 U/L / ALT: 140 U/L / AST: 468 U/L / GGT: x             ____________________________________________

## 2025-01-26 NOTE — CONSULT NOTE ADULT - ASSESSMENT
50yo F BRCA Stage II right invasive ductal carcinoma s/p ddACT followed by RT and had been on anastrozole since 2021, extensive osseous and liver mets, seen by palliative care 1/17 prescribed trial of oxycodone, tramadol, and tizanidine without relief presenting to the ER on 1/20 with acute on chronic abdominal pain.    #Elevated bilirubin   #RUQ pain   #Constipation  #IBS  #Metastatic breast cancer with mets to bone and liver   The patient has an acute rise in her bilirubin which may preclude her from getting certain oncological treatment for her breast cancer. Would want to r/o acute obstruction. As patient cannot get MRI due to anxiety, would get CT A/P with IV contrast and if with stone/ obstruction, then can talk further about possible endoscopic interventions to help decrease bilirubin. If there is not an obstruction then the rise in bilirubin may be due to DILI vs metastatic infiltration of the liver.     Recommendations:  - Would get CT A/P with IV contrast to better assess biliary ducts   - Would stop lactulose due to ability to cause bloating and cramping and use miralax (can escalate to Golytely if still with no BMs); given patient on large dose of pain medications, she may benefit from Relistor   - Avoid hepatotoxic medications   - Trend CMP   - Pain control per palliative and primary team     Amina Aguirre MD  Gastroenterology/Hepatology Fellow, PGY-5  Please contact via TEAMS    NON-URGENT CONSULTS:  Please email [email or web address] OR  [email or web address]

## 2025-01-26 NOTE — PROGRESS NOTE ADULT - PROBLEM SELECTOR PLAN 2
bowel regimen: miralax bid, senna, milk of mag prn, suppository prn  Had multiple large BMs overnight after Golytely

## 2025-01-26 NOTE — CHART NOTE - NSCHARTNOTESELECT_GEN_ALL_CORE
Heme/Onc/Off Service Note
wheelchair/Event Note
GAP 
GAP 
GI update/Off Service Note
Oncology/Event Note
Oncology/Event Note

## 2025-01-26 NOTE — CHART NOTE - NSCHARTNOTEFT_GEN_A_CORE
CT reviewed with Adv GI attending on call. CBD without dilation and lower liklihood of biliary etiology to elevated bilirubin. More likely iso mets. Family updated. Can trial ursodiol to help lower bili/ ALP if needed by oncology.   GI to sign off.     Amina Aguirre MD  Gastroenterology/Hepatology Fellow, PGY-5  Please contact via TEAMS    NON-URGENT CONSULTS:  Please email [email or web address]  [email or web address]

## 2025-01-26 NOTE — CONSULT NOTE ADULT - ATTENDING COMMENTS
Elevated bili   Unclear as yet if role for ercp / stent  pending ct review  further rec to follow
48 y/o BRCA negative  F who had Stage II right invasive ductal carcinoma s/p ddACT followed by RT and had been on anastrozole since 2021. She had POD on 1/2024 and R iliac bone biopsy that is consistent with metastatic breast cancer to the bones and ER positive, [place] negative, Her2 negative. She was on Faslodex and Truqap, however recent PET/CT 12/29/2024 which showed progression of disease. Palliative treatment options were reviewed and she was subsequently prescribed capecitabine: she has not started yet due to concerns about side effects and ongoing back pain. Pt initially went to [facility] ED 1/14/25 endorsing same issue, had a CT scan performed (which showed diffuse osseous mets and innumerable hypodense ill-defined hepatic lesions which measure up  to 1.4 cm in the right hepatic lobe, concerning for metastases)., was not assessed by Radiation Oncology to better control of pain for palliative purposes, received morphine which controlled the pain and was sent home.  Pt now admitted to [facility] for intractable back pain for last few days      # Metastatic breast cancer  # Lower back pain, cancer related  *MRI Thoracic spine done outpatient on 1/17/25 shows- Partially treated diffuse osseous metastases. Largest residual enhancing osteolytic metastasis within the field of view arising within the posterior spinous processes T5.  - recommend dilaudid IV PRN for moderate and severe pain every 3 -4 hours until recommendation from palliative care team  - Obtain palliative care consult for pain control   - Obtain Radiation oncology consult to evaluate for radiation treatment of bone metastasis  - daily CBC w diff, CMP  - Obtain MRI Lumbar spine under anesthesia (Pt could not get MRI lumbar spine outpatient due to pain and was able to complete only MRI thoracic spine)  - will discuss with outpatient oncology team regarding DMT
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 GaP team consulted for pain management.     Pt seen at bedside with mother present. Pt reports ongoing low back pain related to malignancy as noted above. She stated that the IV dilaudid 0.5mg worked for her yesterday but she feels is less effective today. She is agreeable to trial of PO oxycodone 5-10mg q4h PRN mod-severe pain with IV dilaudid 0.8mg q4h PRN breakthrough pain. Extensively discussed opioid regimen and goal of finding optimal dose to maintain functionality. Pt reports anxiety related to illness process for which she takes xanax at night. Education provided on spacing doses of xanaz and opioid to avoid adverse effects of taking in cominbation. All questions answered and support provided. Palliative  referral made for additional support.

## 2025-01-26 NOTE — PROGRESS NOTE ADULT - SUBJECTIVE AND OBJECTIVE BOX
Nella Cavazos MD  Hospitalist  Available on MS Teams      PROGRESS NOTE:     Patient is a 49y old  Female who presents with a chief complaint of intractable pain (26 Jan 2025 07:38)      SUBJECTIVE / OVERNIGHT EVENTS:  Pt this morning in severe pain, states she is 'being tortured by her body.' Pain in the abdomen and lower back. She just got oxycontin. Previously, the IV dilaudids have been effective for her. Per RN, pt with multiple large BMs overnight after taking Golytely     MAR reviewed. On 1/25 she received IV dilaudid 0.5mg at ~9 PM, 5 PM.     MEDICATIONS  (STANDING):  ALPRAZolam 2 milliGRAM(s) Oral at bedtime  capecitabine 1500 milliGRAM(s) Oral two times a day  cholecalciferol 2000 Unit(s) Oral daily  dextrose 5% + sodium chloride 0.9%. 1000 milliLiter(s) (85 mL/Hr) IV Continuous <Continuous>  enoxaparin Injectable 40 milliGRAM(s) SubCutaneous every 24 hours  melatonin 3 milliGRAM(s) Oral once  oxyCODONE  ER Tablet 10 milliGRAM(s) Oral every 12 hours  polyethylene glycol 3350 17 Gram(s) Oral two times a day  senna 2 Tablet(s) Oral at bedtime  sertraline 100 milliGRAM(s) Oral daily    MEDICATIONS  (PRN):  ALPRAZolam 1 milliGRAM(s) Oral daily PRN anxiety  aluminum hydroxide/magnesium hydroxide/simethicone Suspension 30 milliLiter(s) Oral every 4 hours PRN Dyspepsia  bisacodyl Suppository 10 milliGRAM(s) Rectal daily PRN Constipation  HYDROmorphone  Injectable 0.5 milliGRAM(s) IV Push every 4 hours PRN breakthrough pain  HYDROmorphone  Injectable 0.2 milliGRAM(s) IV Push every 4 hours PRN Moderate Pain (4 - 6)  HYDROmorphone  Injectable 0.5 milliGRAM(s) IV Push every 4 hours PRN Severe Pain (7 - 10)  magnesium hydroxide Suspension 30 milliLiter(s) Oral daily PRN Constipation  methocarbamol 500 milliGRAM(s) Oral two times a day PRN Muscle Spasm  naloxone Injectable 0.1 milliGRAM(s) IV Push every 3 minutes PRN overdose  ondansetron Injectable 4 milliGRAM(s) IV Push every 8 hours PRN Nausea and/or Vomiting      CAPILLARY BLOOD GLUCOSE      POCT Blood Glucose.: 117 mg/dL (26 Jan 2025 08:47)    I&O's Summary    25 Jan 2025 07:01  -  26 Jan 2025 07:00  --------------------------------------------------------  IN: 150 mL / OUT: 0 mL / NET: 150 mL        PHYSICAL EXAM:  Vital Signs Last 24 Hrs  T(C): 36.4 (26 Jan 2025 05:09), Max: 36.6 (25 Jan 2025 14:58)  T(F): 97.5 (26 Jan 2025 05:09), Max: 97.9 (25 Jan 2025 14:58)  HR: 99 (26 Jan 2025 05:09) (71 - 100)  BP: 107/73 (26 Jan 2025 05:09) (100/69 - 124/85)  BP(mean): --  RR: 18 (26 Jan 2025 05:09) (18 - 18)  SpO2: 93% (26 Jan 2025 05:09) (91% - 97%)    Parameters below as of 26 Jan 2025 05:09  Patient On (Oxygen Delivery Method): nasal cannula  O2 Flow (L/min): 2    CONSTITUTIONAL: In moderate distress d/t pain  RESPIRATORY: Normal respiratory effort; CTAB  CARDIOVASCULAR: RRR, no peripheral edema   ABDOMEN: nondistended, no guarding/rigidity  PSYCH: A+O x 3, anxious   NEUROLOGY: CN 2-12 are intact and symmetric; no gross sensory or motor deficits        LABS:                        11.7   10.51 )-----------( 70       ( 26 Jan 2025 07:06 )             37.0     01-26    137  |  104  |  9   ----------------------------<  706[HH]  3.2[L]   |  23  |  0.42[L]    Ca    6.8[L]      26 Jan 2025 07:11  Phos  1.2     01-26  Mg     1.9     01-26    TPro  4.3[L]  /  Alb  2.2[L]  /  TBili  5.4[H]  /  DBili  x   /  AST  452[H]  /  ALT  115[H]  /  AlkPhos  266[H]  01-26          Urinalysis Basic - ( 26 Jan 2025 07:11 )    Color: x / Appearance: x / SG: x / pH: x  Gluc: 706 mg/dL / Ketone: x  / Bili: x / Urobili: x   Blood: x / Protein: x / Nitrite: x   Leuk Esterase: x / RBC: x / WBC x   Sq Epi: x / Non Sq Epi: x / Bacteria: x

## 2025-01-26 NOTE — PROGRESS NOTE ADULT - PROBLEM SELECTOR PLAN 4
Mets to liver as well  Continue Capecitabine while inpatient  appreciate oncology c/s  outpatient oncologist: Dr. Neymar Obando

## 2025-01-27 NOTE — PROGRESS NOTE ADULT - PROBLEM SELECTOR PLAN 6
In the event of worsening symptoms, please contact the Palliative Medicine team via pager (if the patient is at [facility] #[phone] or if the patient is at [facility] #[phone]) The Geriatric and Palliative Medicine service has coverage 24 hours a day/ 7 days a week to provide medical recommendations regarding symptom management needs via telephone.

## 2025-01-27 NOTE — PROGRESS NOTE ADULT - PROBLEM SELECTOR PLAN 3
- on miralax BID, senna 2 tab qhs, milk of magnesia   - Bowel regimen while on opioids. Monitor for constipation

## 2025-01-27 NOTE — PROGRESS NOTE ADULT - SUBJECTIVE AND OBJECTIVE BOX
INTERVAL HPI/OVERNIGHT EVENTS:  No overnight events.     MEDICATIONS  (STANDING):  ALPRAZolam 2 milliGRAM(s) Oral at bedtime  cholecalciferol 2000 Unit(s) Oral daily  enoxaparin Injectable 40 milliGRAM(s) SubCutaneous every 24 hours  melatonin 3 milliGRAM(s) Oral once  oxyCODONE  ER Tablet 10 milliGRAM(s) Oral every 12 hours  polyethylene glycol 3350 17 Gram(s) Oral two times a day  senna 2 Tablet(s) Oral at bedtime  sertraline 100 milliGRAM(s) Oral daily    MEDICATIONS  (PRN):  ALPRAZolam 1 milliGRAM(s) Oral daily PRN anxiety  aluminum hydroxide/magnesium hydroxide/simethicone Suspension 30 milliLiter(s) Oral every 4 hours PRN Dyspepsia  bisacodyl Suppository 10 milliGRAM(s) Rectal daily PRN Constipation  HYDROmorphone  Injectable 0.2 milliGRAM(s) IV Push every 4 hours PRN Moderate Pain (4 - 6)  HYDROmorphone  Injectable 0.5 milliGRAM(s) IV Push every 4 hours PRN Severe Pain (7 - 10)  HYDROmorphone  Injectable 0.5 milliGRAM(s) IV Push every 4 hours PRN breakthrough pain  magnesium hydroxide Suspension 30 milliLiter(s) Oral daily PRN Constipation  methocarbamol 500 milliGRAM(s) Oral two times a day PRN Muscle Spasm  naloxone Injectable 0.1 milliGRAM(s) IV Push every 3 minutes PRN overdose  ondansetron Injectable 4 milliGRAM(s) IV Push every 8 hours PRN Nausea and/or Vomiting  sodium chloride 0.65% Nasal 1 Spray(s) Both Nostrils three times a day PRN Nasal Congestion    Allergies    Keflex (Other)    Intolerances          VITAL SIGNS:  T(F): 98.2 (01-27-25 @ 15:03)  HR: 94 (01-27-25 @ 15:03)  BP: 108/70 (01-27-25 @ 15:03)  RR: 19 (01-27-25 @ 15:03)  SpO2: 94% (01-27-25 @ 15:03)  Wt(kg): --    PHYSICAL EXAM:    Constitutional:  thin, frail, appears uncomfortable, dry mucous membranes  Eyes: EOMI, PERRLA  Neck: supple, no masses, no JVD  Respiratory: CTAB; no r/r/w  Cardiovascular: RRR, no M/R/G  Gastrointestinal: Distended, non tender to light touch  Extremities: no c/c/e  Neurological: AAOx3, nonfocal    LABS:                        14.1   13.73 )-----------( 79       ( 27 Jan 2025 07:22 )             43.6     01-27    137  |  102  |  14  ----------------------------<  117[H]  4.2   |  22  |  0.45[L]    Ca    8.2[L]      27 Jan 2025 07:19  Phos  1.4     01-27  Mg     2.4     01-27    TPro  5.4[L]  /  Alb  2.8[L]  /  TBili  8.0[H]  /  DBili  6.0[H]  /  AST  665[H]  /  ALT  164[H]  /  AlkPhos  362[H]  01-27      Urinalysis Basic - ( 27 Jan 2025 07:19 )    Color: x / Appearance: x / SG: x / pH: x  Gluc: 117 mg/dL / Ketone: x  / Bili: x / Urobili: x   Blood: x / Protein: x / Nitrite: x   Leuk Esterase: x / RBC: x / WBC x   Sq Epi: x / Non Sq Epi: x / Bacteria: x        RADIOLOGY & ADDITIONAL TESTS:  Studies reviewed.

## 2025-01-27 NOTE — PROGRESS NOTE ADULT - ATTENDING COMMENTS
48 y/o BRCA negative  F with stage IV breast ca a/w pain crisis. Pain is better controlled. Started xeloda and tolerating well. Plan for DC once pain is controlled on oral regimen.      -
50 y/o BRCA negative  F, stage IV metastatic ER+ HER2 low breast ca, recent POD in the bones and liver, admitted with uncontrolled pain. Appreciate pall for pain control. Appreciate Radiation oncology recommendations once MRI spine is complete. After review of CT spine, no plans for RT given extent of disease. Rec pain control and to start Xeloda as outpt
Metastatic breast cancer involving the liver with obstructive jaundice.  There is not an option for biliary decompression.  Patient's PS is poor.  Recommend focusing on symptom management.  Discussed with patient's 2 sisters, her father, and her friend.  They inquired about prognosis, and my best estimate discussed this would be measured in days–to–weeks.  C/D/W hospitalist and palliative care.    Quintin Myers MD
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 GaP team consulted for pain management.     Pt requiring PRN IV dilaudid in addition to PO oxycodone for pain management. Agree with pain regimen. Will continue to follow for pain management and optimize oral regimen as tolerated.     For acute issues or uncontrolled symptoms please page palliative team.    Gege Osman MD  Geriatrics and Palliative Medicine Attending  [facility] pager: [phone]
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 GaP team consulted for pain management.     Pt requiring PRN IV dilaudid in addition to PO oxycodone for pain management. Discussed d/c IV dilaudid and continuing with PO oxycodone to facilitate transition home. Pt in agreement. Continue PO oxy 10-15mg q4h PRN. Discussed possible long acting medication based on PRN use over next 24 hours.     For acute issues or uncontrolled symptoms please page palliative team.    Gege Osman MD  Geriatrics and Palliative Medicine Attending  [facility] pager: [phone]
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 GaP team consulted for pain management.     Pt with increased lethargy today compared to prior days. Chart reviewed and pt received PRN oxycodone 15mg x1 at ~10am and continued to have severe pain. Palliative team advised 1x dose of  IV dilaudid 0.5mg which patient receivied around 11am. At approx 2 PM patient's sister contacted team expressing concern for worsening lethargy. Pt was receiving PRN IV dilaudid 0.8mg doses yesterday without this level of lethargy. Unclear if lethargy related to opioids, progression of disease or orther metabolic process. Recommend CT head, which was discussed with hospitalist.     For pain management recommend:  D/C oxycodone 10-15mg IR q4h PRN  Start oxycontin 10mg ER for slower release   Continue IV dilaudid 0.2-0.5mg q4h PRN mod-severe pain   Hold above for SBP <90, RR<10 or oversedation    Discussed with patient and family concern for being able to adequately treating pain while maintaining alertness and functional status.  Sister requesting to speak with oncology team to understand status of patient's cancer and treatment options. Onc team notified.  Recommend family meeting next week pending clinical course through weekend. Pt and sister in agreement,    For acute issues or uncontrolled symptoms please page palliative team.    Gege Osman MD  Geriatrics and Palliative Medicine Attending  [facility] pager: [phone]

## 2025-01-27 NOTE — PROVIDER CONTACT NOTE (OTHER) - ASSESSMENT
Patient AOx4, fatigue. patient could not void. Attempted to void 3x but failed. bladder scan with >537ml Patient AOx3, fatigue. patient could not void. Attempted to void 3x but failed. bladder scan with >537ml

## 2025-01-27 NOTE — PROGRESS NOTE ADULT - SUBJECTIVE AND OBJECTIVE BOX
Indication for Geriatrics and Palliative Care Services/INTERVAL HPI: GOC/symptom management     SUBJECTIVE AND OBJECTIVE: Pt lethargic. Unable to provide history. Family present at bedside who report that patient has been able to rest comfortably after receiving PRN IV dilaudid.  OVERNIGHT EVENTS: Required PRN IV dilaudid 0.5mg x2 in 24 hours (7am-7am).    DNR on chart:  Allergies    Keflex (Other)    Intolerances    MEDICATIONS  (STANDING):  ALPRAZolam 2 milliGRAM(s) Oral at bedtime  cholecalciferol 2000 Unit(s) Oral daily  dextrose 5% + sodium chloride 0.9%. 1000 milliLiter(s) (85 mL/Hr) IV Continuous <Continuous>  enoxaparin Injectable 40 milliGRAM(s) SubCutaneous every 24 hours  melatonin 3 milliGRAM(s) Oral once  oxyCODONE  ER Tablet 10 milliGRAM(s) Oral every 12 hours  polyethylene glycol 3350 17 Gram(s) Oral two times a day  senna 2 Tablet(s) Oral at bedtime  sertraline 100 milliGRAM(s) Oral daily    MEDICATIONS  (PRN):  ALPRAZolam 1 milliGRAM(s) Oral daily PRN anxiety  aluminum hydroxide/magnesium hydroxide/simethicone Suspension 30 milliLiter(s) Oral every 4 hours PRN Dyspepsia  bisacodyl Suppository 10 milliGRAM(s) Rectal daily PRN Constipation  HYDROmorphone  Injectable 0.5 milliGRAM(s) IV Push every 4 hours PRN breakthrough pain  HYDROmorphone  Injectable 0.2 milliGRAM(s) IV Push every 4 hours PRN Moderate Pain (4 - 6)  HYDROmorphone  Injectable 0.5 milliGRAM(s) IV Push every 4 hours PRN Severe Pain (7 - 10)  magnesium hydroxide Suspension 30 milliLiter(s) Oral daily PRN Constipation  methocarbamol 500 milliGRAM(s) Oral two times a day PRN Muscle Spasm  naloxone Injectable 0.1 milliGRAM(s) IV Push every 3 minutes PRN overdose  ondansetron Injectable 4 milliGRAM(s) IV Push every 8 hours PRN Nausea and/or Vomiting  sodium chloride 0.65% Nasal 1 Spray(s) Both Nostrils three times a day PRN Nasal Congestion        ITEMS UNCHECKED ARE NOT PRESENT    PRESENT SYMPTOMS: [ x]Unable to self-report - see [ ] CPOT [ ] PAINADS [ ] RDOS  Source if other than patient:  [ ]Family   [ ]Team     Pain: [ ]yes [ ]no  QOL impact -   Location -                 Aggravating factors -  Quality   Radiation -  Timing-  Severity (0-10 scale):   Minimal acceptable level (0-10 scale):     CPOT:    https://www.[facility].org/getattachment/[email or web address]-0780n1728p4y/Critical-Care-Pain-Observation-Tool-(CPOT)    Dyspnea:                           [ ]Mild [ ]Moderate [ ]Severe  Anxiety:                             [ ]Mild [ ]Moderate [ ]Severe  Fatigue:                             [ ]Mild [ ]Moderate [ ]Severe  Nausea:                             [ ]Mild [ ]Moderate [ ]Severe  Loss of appetite:              [ ]Mild [ ]Moderate [ ]Severe  Constipation:                    [ ]Mild [ ]Moderate [ ]Severe    PCSSQ[Palliative Care Spiritual Screening Question]   Severity (0-10):  Score of 4 or > indicate consideration of Chaplaincy referral.  Chaplaincy Referral: [x ] yes [ ] refused [ ] following [ ] Deferred     Caregiver [place]? : [ ] yes [ x] no [ ] Deferred [ ] Declined             Social work referral [ ] Patient & Family Centered Care Referral [ ]     Anticipatory Grief present?:  [x ] yes [ ] no  [ ] Deferred                  Social work referral [ x] Chaplaincy Referral[ x]      Other Symptoms:  [ x]All other review of systems negative   [ ]Unable to obtain due to poor mentation    Palliative Performance Status Version 2:    40     %      http://npcrc.org/files/news/palliative_performance_scale_ppsv2.pdf  PHYSICAL EXAM:  Vital Signs Last 24 Hrs  T(C): 36.2 (27 Jan 2025 10:52), Max: 36.7 (26 Jan 2025 21:17)  T(F): 97.2 (27 Jan 2025 10:52), Max: 98 (26 Jan 2025 21:17)  HR: 93 (27 Jan 2025 10:52) (93 - 95)  BP: 112/74 (27 Jan 2025 10:52) (111/71 - 112/74)  BP(mean): --  RR: 18 (27 Jan 2025 10:52) (18 - 18)  SpO2: 96% (27 Jan 2025 10:52) (96% - 98%)    Parameters below as of 27 Jan 2025 10:52  Patient On (Oxygen Delivery Method): nasal cannula  O2 Flow (L/min): 2    GENERAL: [ ]Cachexia    [x ]Alert  [ x]Oriented x3   [ ]Lethargic  [ ]Unarousable  [ x]Verbal  [ ]Non-Verbal  Behavioral:   [ ] Anxiety  [ ] Delirium [ ] Agitation [ ] Other  HEENT:  [ x]Normal   [ ]Dry mouth   [ ]ET Tube/Trach  [ ]Oral lesions  PULMONARY:   [x ]Clear [ ]Tachypnea  [ ]Audible excessive secretions   [ ]Rhonchi        [ ]Right [ ]Left [ ]Bilateral  [ ]Crackles        [ ]Right [ ]Left [ ]Bilateral  [ ]Wheezing     [ ]Right [ ]Left [ ]Bilateral  [ ]Diminished breath sounds [ ]right [ ]left [ ]bilateral  CARDIOVASCULAR:    [ ]Regular [ ]Irregular [ ]Tachy  [ ]Per [ ]Murmur [ ]Other  GASTROINTESTINAL:  [ x]Soft  [ ]Distended   [ x]+BS  [ x]Non tender [ ]Tender  [ ]Other [ ]PEG [ ]OGT/ NGT  Last BM: 1/26  GENITOURINARY:  [x ]Normal [ ] Incontinent   [ ]Oliguria/Anuria   [ ]Marcial  MUSCULOSKELETAL:   [ ]Normal   [ x]Weakness  [ ]Bed/Wheelchair bound [ ]Edema  NEUROLOGIC:   [x ]No focal deficits  [ ]Cognitive impairment  [ ]Dysphagia [ ]Dysarthria [ ]Paresis [ ]Other   SKIN:   [ ]Normal  [ ]Rash  [ ]Other  [ ]Pressure ulcer(s)       Present on admission [ ]y [ ]n    CRITICAL CARE:  [ ]Shock Present  [ ]Septic [ ]Cardiogenic [ ]Neurologic [ ]Hypovolemic  [ ]Vasopressors [ ]Inotropes  [ ]Respiratory failure present [ ]Mechanical Ventilation [ ]Non-invasive ventilatory support [ ]High-Flow   [ ]Acute  [ ]Chronic [ ]Hypoxic  [ ]Hypercarbic [ ]Other  [ ]Other organ failure -     LABS:                                   14.1   13.73 )-----------( 79       ( 27 Jan 2025 07:22 )             43.6     01-27    137  |  102  |  14  ----------------------------<  117[H]  4.2   |  22  |  0.45[L]    Ca    8.2[L]      27 Jan 2025 07:19  Phos  1.4     01-27  Mg     2.4     01-27    TPro  5.4[L]  /  Alb  2.8[L]  /  TBili  8.0[H]  /  DBili  6.0[H]  /  AST  665[H]  /  ALT  164[H]  /  AlkPhos  362[H]  01-27      RADIOLOGY & ADDITIONAL STUDIES:  < from: CT Abdomen w/ IV Cont (01.25.25 @ 21:05) >  IMPRESSION:  Progression of hepatic metastatic disease and worsening retroperitoneal   adenopathy.    Bilateral pleural effusions and ascites, new compared to prior   examination.    New nonocclusive narrowing of the portal veins.    Nonspecific wall thickening in the right colon, which may reflect colitis   or sequela of mesenteric venous hypertension.    --- End of Report ---          TIFFANY JOHNSON MD; Resident Radiologist  This document has been electronically signed.  ANGELICA IRENE MD; Attending Radiologist  This document has been electronically signed. Jan 26 2025 11:29AM    < end of copied text >      Protein Calorie Malnutrition Present: [ ]mild [ ]moderate [ ]severe [ ]underweight [ ]morbid obesity  https://www.andeal.org/vault/2440/web/files/ONC/Table_Clinical%20Characteristics%20to%20Document%20Malnutrition-White%20JV%20et%20al%202012.pdf    Height (cm): 165.1 (01-22-25 @ 08:56), 165.1 (01-14-25 @ 02:37), 165.1 (01-13-25 @ 19:43)  Weight (kg): 51.7 (01-22-25 @ 08:56), 53.5 (01-13-25 @ 19:43), 53 (12-20-24 @ 13:00)  BMI (kg/m2): 19 (01-22-25 @ 08:56), 19.6 (01-14-25 @ 02:37), 19.6 (01-13-25 @ 19:43)    [ ]PPSV2 < or = 30%  [ ]significant weight loss [ ]poor nutritional intake [ ]anasarca[ ]Artificial Nutrition    Other REFERRALS:  [ ]Hospice  [ ]Child Life  [ ]Social Work  [ ]Case management [ ]Holistic Therapy     Goals of Care Document:

## 2025-01-27 NOTE — PROGRESS NOTE ADULT - NSPROGADDITIONALINFOA_GEN_ALL_CORE
The necessity of the time spent during the encounter on this date of service was due to:   - Ordering, reviewing, and interpreting labs, testing, and imaging.  - Independently obtaining a review of systems and performing a physical exam  - Reviewing prior hospitalization and where necessary, outpatient records.  - Counselling and educating patient and family regarding interpretation of aforementioned items and plan of care.    Time-based billing (NON-critical care). Total minutes spent: 90
The necessity of the time spent during the encounter on this date of service was due to:   - Ordering, reviewing, and interpreting labs, testing, and imaging.  - Independently obtaining a review of systems and performing a physical exam  - Reviewing prior hospitalization and where necessary, outpatient records.  - Counselling and educating patient and family regarding interpretation of aforementioned items and plan of care.    Time-based billing (NON-critical care). Total minutes spent: 54
dispo: CT a/p, possible ERCP, pain control, improvement in HE, further GOC  overall poor prognosis, but patient opting to continue medical treatment and hopeful she will regain enough functioning to be discharged home
The necessity of the time spent during the encounter on this date of service was due to:   - Ordering, reviewing, and interpreting labs, testing, and imaging.  - Independently obtaining a review of systems and performing a physical exam  - Reviewing prior hospitalization and where necessary, outpatient records.  - Counselling and educating patient and family regarding interpretation of aforementioned items and plan of care.    Time-based billing (NON-critical care). Total minutes spent: 54
Updated all family members at bedside, discussed with oncology and pallative  Symptom control has been difficult to balance mental status and pain control and bowel movements  poor prognosis will await oncology meeting with family tonight then discuss next steps.   Poor prognosis

## 2025-01-27 NOTE — PROGRESS NOTE ADULT - PROBLEM SELECTOR PLAN 4
Mets to liver as well  Hold Capecitabine    appreciate oncology c/s  outpatient oncologist: Dr. Neymar Obando   They will discuss tonight with patient next steps in terms of treatment, and prognosis.  Bilirubin still uptrending- can start Urosdiol per hep recs

## 2025-01-27 NOTE — PROGRESS NOTE ADULT - ASSESSMENT
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 GaP team consulted for pain management and GOC.

## 2025-01-27 NOTE — PROGRESS NOTE ADULT - PROBLEM SELECTOR PLAN 5
family awaiting f/u from oncology team regarding treatment options  will f/u GOC discussion tomorrow  HCP form given to patient last week but not yet completed. At this time surrogate is patient's mother if pt unable to participate in decision making  pt remains full code

## 2025-01-27 NOTE — PROGRESS NOTE ADULT - PROBLEM SELECTOR PLAN 1
- per rad onc- no clear localized target for therapy, will f/u MR L/S spine, recommend systemic palliative chemo  - MR L/S spine showing innumerable lesions without any fractures- ok to work with PT and get OOBC  - palliative care team onboard, appreciate recommendations   Continue oxy ER 10mg BID (started 1/24)  Continue IV dialudid 0.2mg q4h PRN MP  Continue IV dilaudid 0.5mg q4h PRN SP  - narcan PRN  - Bowel regimen while on opioids. Monitor for constipation.   - onc started Xeloda inpatient 1/23.

## 2025-01-27 NOTE — PROGRESS NOTE ADULT - ASSESSMENT
48yo F BRCA pmh Stage II right invasive ductal carcinoma s/p ddACT followed by RT and had been on anastrozole since 2021, extensive osseous and liver mets.

## 2025-01-27 NOTE — PROGRESS NOTE ADULT - SUBJECTIVE AND OBJECTIVE BOX
PROGRESS NOTE:   Felicia Warren, DO  Hospitalist  Teams  After 5pm/weekends or if no answer ext: [phone]      Patient is a 49y old  Female who presents with a chief complaint of intractable pain (27 Jan 2025 13:38)      SUBJECTIVE / OVERNIGHT EVENTS:  Pt very uncomfortable, appears dry and nasal bleeding. She says "I feel like I'm dying, please help me".     ADDITIONAL REVIEW OF SYSTEMS:  no fever or chill sno n/v/d    MEDICATIONS  (STANDING):  ALPRAZolam 2 milliGRAM(s) Oral at bedtime  cholecalciferol 2000 Unit(s) Oral daily  dextrose 5% + sodium chloride 0.9%. 1000 milliLiter(s) (85 mL/Hr) IV Continuous <Continuous>  enoxaparin Injectable 40 milliGRAM(s) SubCutaneous every 24 hours  melatonin 3 milliGRAM(s) Oral once  oxyCODONE  ER Tablet 10 milliGRAM(s) Oral every 12 hours  polyethylene glycol 3350 17 Gram(s) Oral two times a day  senna 2 Tablet(s) Oral at bedtime  sertraline 100 milliGRAM(s) Oral daily    MEDICATIONS  (PRN):  ALPRAZolam 1 milliGRAM(s) Oral daily PRN anxiety  aluminum hydroxide/magnesium hydroxide/simethicone Suspension 30 milliLiter(s) Oral every 4 hours PRN Dyspepsia  bisacodyl Suppository 10 milliGRAM(s) Rectal daily PRN Constipation  HYDROmorphone  Injectable 0.5 milliGRAM(s) IV Push every 4 hours PRN breakthrough pain  HYDROmorphone  Injectable 0.2 milliGRAM(s) IV Push every 4 hours PRN Moderate Pain (4 - 6)  HYDROmorphone  Injectable 0.5 milliGRAM(s) IV Push every 4 hours PRN Severe Pain (7 - 10)  magnesium hydroxide Suspension 30 milliLiter(s) Oral daily PRN Constipation  methocarbamol 500 milliGRAM(s) Oral two times a day PRN Muscle Spasm  naloxone Injectable 0.1 milliGRAM(s) IV Push every 3 minutes PRN overdose  ondansetron Injectable 4 milliGRAM(s) IV Push every 8 hours PRN Nausea and/or Vomiting  sodium chloride 0.65% Nasal 1 Spray(s) Both Nostrils three times a day PRN Nasal Congestion      CAPILLARY BLOOD GLUCOSE        I&O's Summary    26 Jan 2025 07:01  -  27 Jan 2025 07:00  --------------------------------------------------------  IN: 0 mL / OUT: 320 mL / NET: -320 mL        PHYSICAL EXAM:  Vital Signs Last 24 Hrs  T(C): 36.8 (27 Jan 2025 15:03), Max: 36.8 (27 Jan 2025 15:03)  T(F): 98.2 (27 Jan 2025 15:03), Max: 98.2 (27 Jan 2025 15:03)  HR: 94 (27 Jan 2025 15:03) (93 - 95)  BP: 108/70 (27 Jan 2025 15:03) (108/70 - 112/74)  BP(mean): --  RR: 19 (27 Jan 2025 15:03) (18 - 19)  SpO2: 94% (27 Jan 2025 15:03) (94% - 98%)    Parameters below as of 27 Jan 2025 15:03  Patient On (Oxygen Delivery Method): nasal cannula  O2 Flow (L/min): 2      CONSTITUTIONAL: thin, frail, appears uncomfortable, dry mucous membranes  ABDOMEN: Distended, non tender to light touch  MUSCLOSKELETAL: no clubbing or cyanosis of digits; b/l LE edema   PSYCH: A+O to person, place, and time; affect appropriate    LABS:                        14.1   13.73 )-----------( 79       ( 27 Jan 2025 07:22 )             43.6     01-27    137  |  102  |  14  ----------------------------<  117[H]  4.2   |  22  |  0.45[L]    Ca    8.2[L]      27 Jan 2025 07:19  Phos  1.4     01-27  Mg     2.4     01-27    TPro  5.4[L]  /  Alb  2.8[L]  /  TBili  8.0[H]  /  DBili  6.0[H]  /  AST  665[H]  /  ALT  164[H]  /  AlkPhos  362[H]  01-27          Urinalysis Basic - ( 27 Jan 2025 07:19 )    Color: x / Appearance: x / SG: x / pH: x  Gluc: 117 mg/dL / Ketone: x  / Bili: x / Urobili: x   Blood: x / Protein: x / Nitrite: x   Leuk Esterase: x / RBC: x / WBC x   Sq Epi: x / Non Sq Epi: x / Bacteria: x          RADIOLOGY & ADDITIONAL TESTS:  Results Reviewed:   Imaging Personally Reviewed:  Electrocardiogram Personally Reviewed:    COORDINATION OF CARE:  Care Discussed with Consultants/Other Providers [Y/N]:  Prior or Outpatient Records Reviewed [Y/N]:

## 2025-01-27 NOTE — PROGRESS NOTE ADULT - PROBLEM SELECTOR PLAN 6
#hyperbilirubinemia- likely 2/2 liver mets. Per discussion with oncology, less likely 2/2 xeloda  - per discussion with med/onc pt unlikely experiencing xeloda AE and in fact should continue it to try and improve disease burden. Cont monitoring on xeloda.   - MRCP on hold. If pt needs it later on, will need to be with anesthesia  - GI onboard, recommending CT a/p with IV contrast   - cont monitoring cmp daily  -Urosdiol

## 2025-01-27 NOTE — PROGRESS NOTE ADULT - PROBLEM SELECTOR PLAN 2
possibly related to progression of disease with rising bilirubin and LFTs  per GI no indication for ERCP at this time

## 2025-01-27 NOTE — PROGRESS NOTE ADULT - ASSESSMENT
48 y/o BRCA negative  F who had Stage II right invasive ductal carcinoma s/p ddACT followed by RT and had been on anastrozole since 2021. She had POD on 1/2024 and R iliac bone biopsy that is consistent with metastatic breast cancer to the bones and ER positive, CA negative, Her2 negative. She was on Faslodex and Truqap, however recent PET/CT 12/29/2024 which showed progression of disease.  Pt initially went to [facility] ED 1/14/25 endorsing same issue, had a CT scan performed (which showed diffuse osseous mets and innumerable hypodense ill-defined hepatic lesions which measure up  to 1.4 cm in the right hepatic lobe, concerning for metastases) and she was discharged once pain was controlled. She subsequently started capecitabine a day prior to presenting to [facility]. Pt admitted to [facility] for intractable back pain. She was seen by palliative care for pain control. However, during the past few days, she went into hepatic failure, and he total bilirubin willie from normal to 8.0 with direct bilirubin 6.0. CT abdomen (1/25/25) showing extensive progression of hepatic disease. GI consulted 1/26/25 for role of stenting, and there was no obvious location to stent. Capecitabine was stopped 1/27/25 given her hepatic failure.     1/27/25 Goals of care discussion: Hospice was discussed with two sisters, patient, friend, and patient's father and the family would like to pursue hospice and make the patient as comfortable as possible. Dr. Obando has had discussions with the family today by phone and has been involved in recommendations today. Family understands that there are no available cancer directed treatments at this time and agreed that patient should be made fully comfort care. They requested that the patient be moved to a private room so that family could come and say their goodbyes. They also are interested in the PCU, and would appreciate palliative care evaluating patient for this unit.         ***************************************************************  Cheyenne Bonilla, PGY5  Fellow Hematology/Oncology  pager: [phone]   Available on Microsoft Teams  After 5pm or on weekends please contact  to page on-call fellow   ***************************************************************

## 2025-01-27 NOTE — PROGRESS NOTE ADULT - PROBLEM SELECTOR PLAN 1
Continue oxy ER 10mg BID (started 1/24)  Continue IV dialudid 0.2mg q4h PRN MP  Continue IV dilaudid 0.5mg q4h PRN SP  - narcan PRN  - Bowel regimen while on opioids. Monitor for constipation.  - Suspect component of existential pain, chaplaincy and SW referrals made  - onc started Xeloda inpatient 1/23

## 2025-01-28 ENCOUNTER — TRANSCRIPTION ENCOUNTER (OUTPATIENT)
Age: 50
End: 2025-01-28

## 2025-01-28 NOTE — PROGRESS NOTE ADULT - PROBLEM SELECTOR PLAN 6
Pt to be transferred to PCU for symptom management and end of life care.    Recommend:  Dilaudid 0.5 mg IV q2h prn dyspnea or tachypnea  Dilaudid 0.5 mg IV q2h prn severe pain and Dilaudid 0.2 mg IV q2h prn moderate pain  Ativan 0.5 mg IV q2h prn agitation   Glycopyrrolate 0.4 mg IV q6h prn copious oral secretions   Dulcolax suppository daily prn constipation   Acetaminophen suppository 650 mg q6h prn fever  Zofran 4 mg IV q8h prn nausea or vomiting    Recommend discontinuation of all medications and interventions that do not serve goal of promoting patient's comfort and dignity at end-of-life.    Please page for any acute symptoms or further questions or concerns. Pt to be transferred to PCU for symptom management and end of life care.  Case discussed with hospitalist and ACP.    For acute issues or uncontrolled symptoms please page palliative team.    Gege Osman MD  Geriatrics and Palliative Medicine Attending  [facility] pager: [phone]     The Geriatrics and Palliative Medicine consult service has 24/7 coverage for medical recommendations, including symptom management needs.

## 2025-01-28 NOTE — PROGRESS NOTE ADULT - PROBLEM SELECTOR PROBLEM 1
Cancer-related pain
Pain, cancer
Cancer-related pain
Pain, cancer
Cancer-related pain
Pain, cancer
Pain, cancer
Cancer-related pain
Cancer-related pain

## 2025-01-28 NOTE — PROGRESS NOTE ADULT - NUTRITIONAL ASSESSMENT
This patient has been assessed with a concern for Malnutrition and has been determined to have a diagnosis/diagnoses of Severe protein-calorie malnutrition and Underweight (BMI < 19).    This patient is being managed with:   Diet Regular-  Kosher  Entered: Jan 20 2025 11:05AM  

## 2025-01-28 NOTE — PROGRESS NOTE ADULT - PROVIDER SPECIALTY LIST ADULT
Heme/Onc
Heme/Onc
Palliative Care
Heme/Onc
Palliative Care
Hospitalist
Palliative Care
Hospitalist
Internal Medicine
Palliative Care
Hospitalist
Palliative Care
Hospitalist

## 2025-01-28 NOTE — PROGRESS NOTE ADULT - SUBJECTIVE AND OBJECTIVE BOX
PROGRESS NOTE:   Felicia Warren, DO  Hospitalist  Teams  After 5pm/weekends or if no answer ext: [phone]      Patient is a 49y old  Female who presents with a chief complaint of intractable pain (28 Jan 2025 10:43)      SUBJECTIVE / OVERNIGHT EVENTS: Less alert this morning, more lethargic but appears comfortable.     ADDITIONAL REVIEW OF SYSTEMS:  not able to obtain 2/2 ams    MEDICATIONS  (STANDING):  HYDROmorphone  Injectable 0.5 milliGRAM(s) IV Push every 6 hours    MEDICATIONS  (PRN):  acetaminophen  Suppository .. 650 milliGRAM(s) Rectal every 6 hours PRN Temp greater or equal to 38C (100.4F), Mild Pain (1 - 3)  bisacodyl Suppository 10 milliGRAM(s) Rectal daily PRN Constipation  glycopyrrolate Injectable 0.4 milliGRAM(s) IV Push every 6 hours PRN secretions  HYDROmorphone  Injectable 1 milliGRAM(s) IV Push every 1 hour PRN dyspnea  HYDROmorphone  Injectable 0.5 milliGRAM(s) IV Push every 1 hour PRN Moderate Pain (4 - 6)  HYDROmorphone  Injectable 1 milliGRAM(s) IV Push every 1 hour PRN Severe Pain (7 - 10)  LORazepam   Injectable 0.5 milliGRAM(s) IV Push every 1 hour PRN anxiety, agitation, refractory dyspnea  ondansetron Injectable 4 milliGRAM(s) IV Push every 8 hours PRN Nausea and/or Vomiting      CAPILLARY BLOOD GLUCOSE        I&O's Summary    27 Jan 2025 07:01  -  28 Jan 2025 07:00  --------------------------------------------------------  IN: 60 mL / OUT: 550 mL / NET: -490 mL        PHYSICAL EXAM:  Vital Signs Last 24 Hrs  T(C): 36.3 (28 Jan 2025 05:30), Max: 36.4 (27 Jan 2025 22:53)  T(F): 97.4 (28 Jan 2025 05:30), Max: 97.6 (27 Jan 2025 22:53)  HR: 103 (28 Jan 2025 05:30) (99 - 103)  BP: 124/84 (28 Jan 2025 05:30) (124/84 - 126/85)  BP(mean): --  RR: 18 (28 Jan 2025 05:30) (18 - 18)  SpO2: 97% (28 Jan 2025 05:30) (97% - 99%)    Parameters below as of 28 Jan 2025 05:30  Patient On (Oxygen Delivery Method): nasal cannula  O2 Flow (L/min): 2      CONSTITUTIONAL: NAD, resing, tachypnic, jaundice, thin, frail  MUSCLOSKELETAL: no clubbing or cyanosis of digits; no joint swelling or tenderness to palpation     LABS:                        14.1   13.73 )-----------( 79       ( 27 Jan 2025 07:22 )             43.6     01-27    137  |  102  |  14  ----------------------------<  117[H]  4.2   |  22  |  0.45[L]    Ca    8.2[L]      27 Jan 2025 07:19  Phos  1.4     01-27  Mg     2.4     01-27    TPro  5.4[L]  /  Alb  2.8[L]  /  TBili  8.0[H]  /  DBili  6.0[H]  /  AST  665[H]  /  ALT  164[H]  /  AlkPhos  362[H]  01-27          Urinalysis Basic - ( 27 Jan 2025 07:19 )    Color: x / Appearance: x / SG: x / pH: x  Gluc: 117 mg/dL / Ketone: x  / Bili: x / Urobili: x   Blood: x / Protein: x / Nitrite: x   Leuk Esterase: x / RBC: x / WBC x   Sq Epi: x / Non Sq Epi: x / Bacteria: x          RADIOLOGY & ADDITIONAL TESTS:  Results Reviewed:   Imaging Personally Reviewed:  Electrocardiogram Personally Reviewed:    COORDINATION OF CARE:  Care Discussed with Consultants/Other Providers [Y/N]:  Prior or Outpatient Records Reviewed [Y/N]:

## 2025-01-28 NOTE — PROGRESS NOTE ADULT - PROBLEM SELECTOR PLAN 1
d/c PO oxycontin (lacks reliable PO route)  Continue IV dialudid 0.2mg q4h PRN MP  Continue IV dilaudid 0.5mg q4h PRN SP

## 2025-01-28 NOTE — PROGRESS NOTE ADULT - ASSESSMENT
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 GaP team consulted for pain management and GOC.

## 2025-01-28 NOTE — PROGRESS NOTE ADULT - PROBLEM SELECTOR PLAN 7
dehydrated from poor po   LR x 24 hours  lovenox for elevated improve score with active malignancy
dehydrated from poor po   LR x 24 hours  lovenox for elevated improve score with active malignancy
lovenox for elevated improve score with active malignancy  Transferred to PCU for comfort measures  Updated family  spoke with palliative team

## 2025-01-28 NOTE — PROGRESS NOTE ADULT - TIME BILLING
- Ordering, reviewing, and interpreting labs, testing, and imaging.  - Independently obtaining a review of systems and performing a physical exam  - Reviewing consultant documentation/recommendations in addition to discussing plan of care with consultants.  - Counselling and educating patient and family regarding interpretation of aforementioned items and plan of care.
This includes chart review, patient assessment, discussion and collaboration with interdisciplinary team members.
- Ordering, reviewing, and interpreting labs, testing, and imaging.  - Independently obtaining a review of systems and performing a physical exam  - Reviewing consultant documentation/recommendations in addition to discussing plan of care with consultants.  - Counselling and educating patient and family regarding interpretation of aforementioned items and plan of care.
Epidermal Closure Graft Donor Site (Optional): simple interrupted

## 2025-01-28 NOTE — PROGRESS NOTE ADULT - ASSESSMENT
50yo F BRCA pmh Stage II right invasive ductal carcinoma s/p ddACT followed by RT and had been on anastrozole since 2021, extensive osseous and liver mets.

## 2025-01-28 NOTE — PROGRESS NOTE ADULT - PROBLEM SELECTOR PLAN 2
suspect related to progression of disease with rising bilirubin and LFTs  per GI no indication for ERCP at this time

## 2025-01-28 NOTE — PROGRESS NOTE ADULT - RESPIRATORY DISTRESS OBSERVATION: HEART RATE
Type of surgery: robotic incisional hernia repair with mesh, possible explantation of old mesh, possible open  Location of surgery: [facility]   Date of surgery: 7/10/20  Surgeon: Dr. Odom  Pre-Op Appt Date: 7/6/20  Post-Op Appt Date: 7/21/20   Packet sent out: Surgery packet mailed to patient's home address.   Pre-cert/Authorization completed: NA  Date: 6/4/2020    Dixie Dorado  Surgery Scheduler    
Less than 90 beats
Less than 90 beats

## 2025-01-28 NOTE — PROGRESS NOTE ADULT - PROBLEM SELECTOR PLAN 5
goals are now for comfort directed care  pt confirmed for pcu transfer  DNR/DNI established, HARVEY compelted  HCP form in chart, sister Flori is HCP

## 2025-01-28 NOTE — PROGRESS NOTE ADULT - SUBJECTIVE AND OBJECTIVE BOX
Indication for Geriatrics and Palliative Care Services/INTERVAL HPI: GOC/symptom management     SUBJECTIVE AND OBJECTIVE: Pt lethargic. Unable to provide history. Family present at bedside. See GOC note  OVERNIGHT EVENTS: Required PRN IV dilaudid 0.5mg x3 in 24 hours (7am-7am).    DNR on chart:  Allergies    Keflex (Other)    Intolerances    MEDICATIONS  (STANDING):  ALPRAZolam 2 milliGRAM(s) Oral at bedtime  cholecalciferol 2000 Unit(s) Oral daily  dextrose 5% + sodium chloride 0.9%. 1000 milliLiter(s) (85 mL/Hr) IV Continuous <Continuous>  enoxaparin Injectable 40 milliGRAM(s) SubCutaneous every 24 hours  melatonin 3 milliGRAM(s) Oral once  oxyCODONE  ER Tablet 10 milliGRAM(s) Oral every 12 hours  polyethylene glycol 3350 17 Gram(s) Oral two times a day  senna 2 Tablet(s) Oral at bedtime  sertraline 100 milliGRAM(s) Oral daily    MEDICATIONS  (PRN):  ALPRAZolam 1 milliGRAM(s) Oral daily PRN anxiety  aluminum hydroxide/magnesium hydroxide/simethicone Suspension 30 milliLiter(s) Oral every 4 hours PRN Dyspepsia  bisacodyl Suppository 10 milliGRAM(s) Rectal daily PRN Constipation  HYDROmorphone  Injectable 0.5 milliGRAM(s) IV Push every 4 hours PRN breakthrough pain  HYDROmorphone  Injectable 0.2 milliGRAM(s) IV Push every 4 hours PRN Moderate Pain (4 - 6)  HYDROmorphone  Injectable 0.5 milliGRAM(s) IV Push every 4 hours PRN Severe Pain (7 - 10)  magnesium hydroxide Suspension 30 milliLiter(s) Oral daily PRN Constipation  methocarbamol 500 milliGRAM(s) Oral two times a day PRN Muscle Spasm  naloxone Injectable 0.1 milliGRAM(s) IV Push every 3 minutes PRN overdose  ondansetron Injectable 4 milliGRAM(s) IV Push every 8 hours PRN Nausea and/or Vomiting  sodium chloride 0.65% Nasal 1 Spray(s) Both Nostrils three times a day PRN Nasal Congestion        ITEMS UNCHECKED ARE NOT PRESENT    PRESENT SYMPTOMS: [ x]Unable to self-report - see [ ] CPOT [ ] PAINADS [ ] RDOS  Source if other than patient:  [ ]Family   [ ]Team     Pain: [ ]yes [ ]no  QOL impact -   Location -                 Aggravating factors -  Quality   Radiation -  Timing-  Severity (0-10 scale):   Minimal acceptable level (0-10 scale):     CPOT:    https://www.[facility].org/getattachment/[email or web address]/Critical-Care-Pain-Observation-Tool-(CPOT)    Dyspnea:                           [ ]Mild [ ]Moderate [ ]Severe  Anxiety:                             [ ]Mild [ ]Moderate [ ]Severe  Fatigue:                             [ ]Mild [ ]Moderate [ ]Severe  Nausea:                             [ ]Mild [ ]Moderate [ ]Severe  Loss of appetite:              [ ]Mild [ ]Moderate [ ]Severe  Constipation:                    [ ]Mild [ ]Moderate [ ]Severe    PCSSQ[Palliative Care Spiritual Screening Question]   Severity (0-10):  Score of 4 or > indicate consideration of Chaplaincy referral.  Chaplaincy Referral: [x ] yes [ ] refused [ ] following [ ] Deferred     Caregiver [place]? : [ ] yes [ x] no [ ] Deferred [ ] Declined             Social work referral [ ] Patient & Family Centered Care Referral [ ]     Anticipatory Grief present?:  [x ] yes [ ] no  [ ] Deferred                  Social work referral [ x] Chaplaincy Referral[ x]      Other Symptoms:  [ ]All other review of systems negative   [ x]Unable to obtain due to poor mentation    Palliative Performance Status Version 2:    10     %      http://npcrc.org/files/news/palliative_performance_scale_ppsv2.pdf  PHYSICAL EXAM:  Vital Signs Last 24 Hrs  T(C): 36.3 (28 Jan 2025 05:30), Max: 36.4 (27 Jan 2025 22:53)  T(F): 97.4 (28 Jan 2025 05:30), Max: 97.6 (27 Jan 2025 22:53)  HR: 103 (28 Jan 2025 05:30) (99 - 103)  BP: 124/84 (28 Jan 2025 05:30) (124/84 - 126/85)  BP(mean): --  RR: 18 (28 Jan 2025 05:30) (18 - 18)  SpO2: 97% (28 Jan 2025 05:30) (97% - 99%)    Parameters below as of 28 Jan 2025 05:30  Patient On (Oxygen Delivery Method): nasal cannula  O2 Flow (L/min): 2    GENERAL: [ ]Cachexia    [ ]Alert  [  ]Oriented x3   [x ]Lethargic  [ ]Unarousable  [  ]Verbal  [ ]Non-Verbal  Behavioral:   [ ] Anxiety  [ ] Delirium [ ] Agitation [ ] Other  HEENT:  [ x]Normal   [ ]Dry mouth   [ ]ET Tube/Trach  [ ]Oral lesions  PULMONARY:   [x ]Clear [ ]Tachypnea  [ ]Audible excessive secretions   [ ]Rhonchi        [ ]Right [ ]Left [ ]Bilateral  [ ]Crackles        [ ]Right [ ]Left [ ]Bilateral  [ ]Wheezing     [ ]Right [ ]Left [ ]Bilateral  [ ]Diminished breath sounds [ ]right [ ]left [ ]bilateral  CARDIOVASCULAR:    [ ]Regular [ ]Irregular [ ]Tachy  [ ]Per [ ]Murmur [ ]Other  GASTROINTESTINAL:  [ x]Soft  [ ]Distended   [ x]+BS  [ x]Non tender [ ]Tender  [ ]Other [ ]PEG [ ]OGT/ NGT  Last BM: 1/26  GENITOURINARY:  [x ]Normal [ ] Incontinent   [ ]Oliguria/Anuria   [ ]Marcial  MUSCULOSKELETAL:   [ ]Normal   [ x]Weakness  [ ]Bed/Wheelchair bound [ ]Edema  NEUROLOGIC:   [x ]No focal deficits  [ ]Cognitive impairment  [ ]Dysphagia [ ]Dysarthria [ ]Paresis [ ]Other   SKIN:   [ ]Normal  [ ]Rash  [ ]Other  [ ]Pressure ulcer(s)       Present on admission [ ]y [ ]n    CRITICAL CARE:  [ ]Shock Present  [ ]Septic [ ]Cardiogenic [ ]Neurologic [ ]Hypovolemic  [ ]Vasopressors [ ]Inotropes  [ ]Respiratory failure present [ ]Mechanical Ventilation [ ]Non-invasive ventilatory support [ ]High-Flow   [ ]Acute  [ ]Chronic [ ]Hypoxic  [ ]Hypercarbic [ ]Other  [ ]Other organ failure -     LABS:                                   14.1   13.73 )-----------( 79       ( 27 Jan 2025 07:22 )             43.6   01-27    137  |  102  |  14  ----------------------------<  117[H]  4.2   |  22  |  0.45[L]    Ca    8.2[L]      27 Jan 2025 07:19  Phos  1.4     01-27  Mg     2.4     01-27    TPro  5.4[L]  /  Alb  2.8[L]  /  TBili  8.0[H]  /  DBili  6.0[H]  /  AST  665[H]  /  ALT  164[H]  /  AlkPhos  362[H]  01-27      RADIOLOGY & ADDITIONAL STUDIES:  < from: CT Abdomen w/ IV Cont (01.25.25 @ 21:05) >  IMPRESSION:  Progression of hepatic metastatic disease and worsening retroperitoneal   adenopathy.    Bilateral pleural effusions and ascites, new compared to prior   examination.    New nonocclusive narrowing of the portal veins.    Nonspecific wall thickening in the right colon, which may reflect colitis   or sequela of mesenteric venous hypertension.    --- End of Report ---          TIFFANY JOHNSON MD; Resident Radiologist  This document has been electronically signed.  ANGELICA IRENE MD; Attending Radiologist  This document has been electronically signed. Jan 26 2025 11:29AM    < end of copied text >      Protein Calorie Malnutrition Present: [ ]mild [ ]moderate [ ]severe [ ]underweight [ ]morbid obesity  https://www.andeal.org/vault/2440/web/files/ONC/Table_Clinical%20Characteristics%20to%20Document%20Malnutrition-White%20JV%20et%20al%582742.pdf    Height (cm): 165.1 (01-22-25 @ 08:56), 165.1 (01-14-25 @ 02:37), 165.1 (01-13-25 @ 19:43)  Weight (kg): 51.7 (01-22-25 @ 08:56), 53.5 (01-13-25 @ 19:43), 53 (12-20-24 @ 13:00)  BMI (kg/m2): 19 (01-22-25 @ 08:56), 19.6 (01-14-25 @ 02:37), 19.6 (01-13-25 @ 19:43)    [ ]PPSV2 < or = 30%  [ ]significant weight loss [ ]poor nutritional intake [ ]anasarca[ ]Artificial Nutrition    Other REFERRALS:  [ ]Hospice  [ ]Child Life  [ ]Social Work  [ ]Case management [ ]Holistic Therapy     Goals of Care Document: Indication for Geriatrics and Palliative Care Services/INTERVAL HPI: GOC/symptom management     SUBJECTIVE AND OBJECTIVE: Pt lethargic. Unable to provide history. Family present at bedside. See GOC note.  OVERNIGHT EVENTS: Required PRN IV dilaudid 0.5mg x3 in 24 hours (7am-7am).    DNR on chart:  Allergies    Keflex (Other)    Intolerances    MEDICATIONS  (STANDING):  ALPRAZolam 2 milliGRAM(s) Oral at bedtime  cholecalciferol 2000 Unit(s) Oral daily  dextrose 5% + sodium chloride 0.9%. 1000 milliLiter(s) (85 mL/Hr) IV Continuous <Continuous>  enoxaparin Injectable 40 milliGRAM(s) SubCutaneous every 24 hours  melatonin 3 milliGRAM(s) Oral once  oxyCODONE  ER Tablet 10 milliGRAM(s) Oral every 12 hours  polyethylene glycol 3350 17 Gram(s) Oral two times a day  senna 2 Tablet(s) Oral at bedtime  sertraline 100 milliGRAM(s) Oral daily    MEDICATIONS  (PRN):  ALPRAZolam 1 milliGRAM(s) Oral daily PRN anxiety  aluminum hydroxide/magnesium hydroxide/simethicone Suspension 30 milliLiter(s) Oral every 4 hours PRN Dyspepsia  bisacodyl Suppository 10 milliGRAM(s) Rectal daily PRN Constipation  HYDROmorphone  Injectable 0.5 milliGRAM(s) IV Push every 4 hours PRN breakthrough pain  HYDROmorphone  Injectable 0.2 milliGRAM(s) IV Push every 4 hours PRN Moderate Pain (4 - 6)  HYDROmorphone  Injectable 0.5 milliGRAM(s) IV Push every 4 hours PRN Severe Pain (7 - 10)  magnesium hydroxide Suspension 30 milliLiter(s) Oral daily PRN Constipation  methocarbamol 500 milliGRAM(s) Oral two times a day PRN Muscle Spasm  naloxone Injectable 0.1 milliGRAM(s) IV Push every 3 minutes PRN overdose  ondansetron Injectable 4 milliGRAM(s) IV Push every 8 hours PRN Nausea and/or Vomiting  sodium chloride 0.65% Nasal 1 Spray(s) Both Nostrils three times a day PRN Nasal Congestion        ITEMS UNCHECKED ARE NOT PRESENT    PRESENT SYMPTOMS: [ x]Unable to self-report - see [ ] CPOT [x ] PAINADS [x ] RDOS  Source if other than patient:  [ ]Family   [ ]Team     Pain: [ ]yes [ ]no  QOL impact -   Location -                 Aggravating factors -  Quality   Radiation -  Timing-  Severity (0-10 scale):   Minimal acceptable level (0-10 scale):     CPOT:    https://www.[facility].org/getattachment/[email or web address]/Critical-Care-Pain-Observation-Tool-(CPOT)    Dyspnea:                           [ ]Mild [ ]Moderate [ ]Severe  Anxiety:                             [ ]Mild [ ]Moderate [ ]Severe  Fatigue:                             [ ]Mild [ ]Moderate [ ]Severe  Nausea:                             [ ]Mild [ ]Moderate [ ]Severe  Loss of appetite:              [ ]Mild [ ]Moderate [ ]Severe  Constipation:                    [ ]Mild [ ]Moderate [ ]Severe    PCSSQ[Palliative Care Spiritual Screening Question]   Severity (0-10):  Score of 4 or > indicate consideration of Chaplaincy referral.  Chaplaincy Referral: [x ] yes [ ] refused [ ] following [ ] Deferred     Caregiver [place]? : [ ] yes [ x] no [ ] Deferred [ ] Declined             Social work referral [ ] Patient & Family Centered Care Referral [ ]     Anticipatory Grief present?:  [x ] yes [ ] no  [ ] Deferred                  Social work referral [ x] Chaplaincy Referral[ x]      Other Symptoms:  [ ]All other review of systems negative   [ x]Unable to obtain due to poor mentation    Palliative Performance Status Version 2:    10     %      http://npcrc.org/files/news/palliative_performance_scale_ppsv2.pdf  PHYSICAL EXAM:  Vital Signs Last 24 Hrs  T(C): 36.3 (28 Jan 2025 05:30), Max: 36.4 (27 Jan 2025 22:53)  T(F): 97.4 (28 Jan 2025 05:30), Max: 97.6 (27 Jan 2025 22:53)  HR: 103 (28 Jan 2025 05:30) (99 - 103)  BP: 124/84 (28 Jan 2025 05:30) (124/84 - 126/85)  BP(mean): --  RR: 18 (28 Jan 2025 05:30) (18 - 18)  SpO2: 97% (28 Jan 2025 05:30) (97% - 99%)    Parameters below as of 28 Jan 2025 05:30  Patient On (Oxygen Delivery Method): nasal cannula  O2 Flow (L/min): 2    GENERAL: [ ]Cachexia    [ ]Alert  [  ]Oriented x3   [x ]Lethargic  [ ]Unarousable  [  ]Verbal  [ ]Non-Verbal  Behavioral:   [ ] Anxiety  [ ] Delirium [ ] Agitation [ ] Other  HEENT:  [ x]Normal   [ ]Dry mouth   [ ]ET Tube/Trach  [ ]Oral lesions  PULMONARY:   [x ]Clear [ ]Tachypnea  [ ]Audible excessive secretions   [ ]Rhonchi        [ ]Right [ ]Left [ ]Bilateral  [ ]Crackles        [ ]Right [ ]Left [ ]Bilateral  [ ]Wheezing     [ ]Right [ ]Left [ ]Bilateral  [ ]Diminished breath sounds [ ]right [ ]left [ ]bilateral  CARDIOVASCULAR:    [ ]Regular [ ]Irregular [ ]Tachy  [ ]Per [ ]Murmur [ ]Other  GASTROINTESTINAL:  [ x]Soft  [ ]Distended   [ x]+BS  [ x]Non tender [ ]Tender  [ ]Other [ ]PEG [ ]OGT/ NGT  Last BM: 1/26  GENITOURINARY:  [x ]Normal [ ] Incontinent   [ ]Oliguria/Anuria   [ ]Marcial  MUSCULOSKELETAL:   [ ]Normal   [ x]Weakness  [ ]Bed/Wheelchair bound [ ]Edema  NEUROLOGIC:   [x ]No focal deficits  [ ]Cognitive impairment  [ ]Dysphagia [ ]Dysarthria [ ]Paresis [ ]Other   SKIN:   [ ]Normal  [ ]Rash  [ ]Other  [ ]Pressure ulcer(s)       Present on admission [ ]y [ ]n    CRITICAL CARE:  [ ]Shock Present  [ ]Septic [ ]Cardiogenic [ ]Neurologic [ ]Hypovolemic  [ ]Vasopressors [ ]Inotropes  [ ]Respiratory failure present [ ]Mechanical Ventilation [ ]Non-invasive ventilatory support [ ]High-Flow   [ ]Acute  [ ]Chronic [ ]Hypoxic  [ ]Hypercarbic [ ]Other  [ ]Other organ failure -     LABS:                                   14.1   13.73 )-----------( 79       ( 27 Jan 2025 07:22 )             43.6   01-27    137  |  102  |  14  ----------------------------<  117[H]  4.2   |  22  |  0.45[L]    Ca    8.2[L]      27 Jan 2025 07:19  Phos  1.4     01-27  Mg     2.4     01-27    TPro  5.4[L]  /  Alb  2.8[L]  /  TBili  8.0[H]  /  DBili  6.0[H]  /  AST  665[H]  /  ALT  164[H]  /  AlkPhos  362[H]  01-27      RADIOLOGY & ADDITIONAL STUDIES:  < from: CT Abdomen w/ IV Cont (01.25.25 @ 21:05) >  IMPRESSION:  Progression of hepatic metastatic disease and worsening retroperitoneal   adenopathy.    Bilateral pleural effusions and ascites, new compared to prior   examination.    New nonocclusive narrowing of the portal veins.    Nonspecific wall thickening in the right colon, which may reflect colitis   or sequela of mesenteric venous hypertension.    --- End of Report ---          TIFFANY JOHNSON MD; Resident Radiologist  This document has been electronically signed.  ANGELICA IRENE MD; Attending Radiologist  This document has been electronically signed. Jan 26 2025 11:29AM    < end of copied text >      Protein Calorie Malnutrition Present: [ ]mild [ ]moderate [ ]severe [ ]underweight [ ]morbid obesity  https://www.andeal.org/vault/2440/web/files/ONC/Table_Clinical%20Characteristics%20to%20Document%20Malnutrition-White%20JV%20et%20al%893491.pdf    Height (cm): 165.1 (01-22-25 @ 08:56), 165.1 (01-14-25 @ 02:37), 165.1 (01-13-25 @ 19:43)  Weight (kg): 51.7 (01-22-25 @ 08:56), 53.5 (01-13-25 @ 19:43), 53 (12-20-24 @ 13:00)  BMI (kg/m2): 19 (01-22-25 @ 08:56), 19.6 (01-14-25 @ 02:37), 19.6 (01-13-25 @ 19:43)    [ ]PPSV2 < or = 30%  [ ]significant weight loss [ ]poor nutritional intake [ ]anasarca[ ]Artificial Nutrition    Other REFERRALS:  [ ]Hospice  [ x]Child Life  [x ]Social Work  [ ]Case management [ ]Holistic Therapy     Goals of Care Document:

## 2025-01-28 NOTE — PROGRESS NOTE ADULT - PROBLEM SELECTOR PLAN 4
With Mets to Liver and bone  Poor functional status and hyperbilirubinemia precluding further treatment  GOC done with patient and family and oncology 1/27 and pt was transferred to PCU after evaluation by palliative today.

## 2025-01-28 NOTE — PROGRESS NOTE ADULT - REASON FOR ADMISSION
intractable pain

## 2025-01-28 NOTE — PROGRESS NOTE ADULT - PROBLEM SELECTOR PROBLEM 6
Hyperbilirubinemia
Hyperbilirubinemia
Palliative care encounter
Hyperbilirubinemia
Palliative care encounter
Palliative care encounter

## 2025-01-28 NOTE — PROGRESS NOTE ADULT - PROBLEM SELECTOR PLAN 6
#hyperbilirubinemia- likely 2/2 liver mets   - GI onboard,  CT a/p with IV contrast shows no obstruction to intervene on.    - cont monitoring cmp daily  -Urosdiol offered by GI but low utility at this stage so would hold off

## 2025-01-28 NOTE — PROGRESS NOTE ADULT - PROBLEM SELECTOR PLAN 1
Pain and cancer have progressed and was evaluated for PCU.  Was on the following this morning:  - palliative care team onboard, appreciate recommendations   Continue oxy ER 10mg BID (started 1/24)  Continue IV dialudid 0.2mg q4h PRN MP  Continue IV dilaudid 0.5mg q4h PRN SP  - narcan PRN  - Bowel regimen while on opioids. Monitor for constipation.    -Palliative to adjust as necessary

## 2025-01-28 NOTE — PROGRESS NOTE ADULT - NS ATTEST RISK PROBLEM GEN_ALL_CORE FT
safety/toxicity monitoring of intravenous opiates and/or benzodiazepines in advanced illness process.
safety/toxicity monitoring of intravenous opiates and/or benzodiazepines in advanced illness process.
1. Number and complexity of problems addressed for this patient:    1.1 Moderate (At least 1)  [ ] 1 or more chronic illnesses with exacerbation, progression, or side effects of treatment  [ ] 2 or more stable chronic illnesses  [ ] 1 undiagnosed new problem with uncertain prognosis  [ ] 1 acute illness with systemic symptoms  [ ] 1 acute complicated injury  1.2 High (At least 1)   [x ] 1 or more chronic illnesses with severe exacerbation, progression, or side effects of treatment  [ ] 1 acute or chronic illnesses or injuries that may pose a threat to life or bodily function    2. Amount and/or Complexity of Data that was Reviewed and Analyzed for this case:       Moderate (1 out of 3)       High (2 out of 3)  2.1. (Any combination of 3 of the following)   [ ] Prior External notes were reviewed  [ ] Each test result was reviewed (see "LABS" and "RADIOLOGY & ADDITIONAL STUDIES" above)  [ ] The following tests were ordered and/or reviewed (Only count 1 point for ordering or reviewing a unique test):  	[ ]CBC  	[ ] Chemistry   	[ ] Imaging   	[ ] Other:   [ ] Assessment requiring an independent historian   		Name of historian and relationship:   2.2  [ ] Personally review and interpretation of  image or testing   2.3  [ ] Discussion of management or test interpretation with external physician/other qualified health care professional\appropriate source (not separately reported)    3. Risk of Complications and/or Morbidity or Mortality of for this Patient’s Management:  3.1 Moderate risk of morbidity from additional diagnostic testing or treatment (At least 1):   [ ] Prescription drug management   [ ] Decision regarding minor surgery, treatment, or procedure with identified patient or procedure risk factors  [ ] Decision regarding elective major surgery, treatment, or procedure without identified patient or procedure risk factors   [ ] Diagnosis or treatment significantly limited by social determinants of health   [ ] Other:   3.2 High risk of morbidity from additional diagnostic testing or treatment (At least 1):   [ x] Drug therapy requiring intensive monitoring for toxicity   [ ] Decision regarding elective major surgery, treatment, or procedure with identified patient or procedure risk factors   [ ] Decision regarding emergency major surgery, treatment, or procedure   [ ] Decision regarding hospitalization or escalation of hospital-level of care  [ ] Decision not to resuscitate, not to intubate, or to de-escalate care because of poor prognosis   [ ] Decision to proceed or not with artificial nutrition   [x ] Parenteral controlled substance  [ ] Other:
safety/toxicity monitoring of intravenous opiates and/or benzodiazepines in advanced illness process.

## 2025-01-28 NOTE — PROGRESS NOTE ADULT - PROBLEM SELECTOR PLAN 3
#toxic metabolic encephalopathy- noted to be more lethargic 1/24, out of proportion to what would be expected from receiving IV dilaudid. ddx includes  hyperammonemia    - CTH non con : no SDH, massess, or midline shift   - holding off on further lactulose for abd discomfort but now increasing ammonia  - mental worsening today

## 2025-01-29 NOTE — PROVIDER CONTACT NOTE (OTHER) - RECOMMENDATIONS
Patient pronounced at 0429  Family (sisters, Flori Vidal) at bedside
monitor v/s
Notified the provider

## 2025-01-29 NOTE — PROVIDER CONTACT NOTE (OTHER) - SITUATION
Patient without spontaneous respirations or pulse
patient could not void. Attempted to void 3x but failed. bladder scan with >537ml
Pt. reported of small dried blood, that first started last night.

## 2025-01-29 NOTE — PROVIDER CONTACT NOTE (OTHER) - ACTION/TREATMENT ORDERED:
Provider made aware. Straight cath ordered. Bladder scan q6 ordered.
nasal spray sodium chloride 0.65% ordered
See patient expiration note

## 2025-01-29 NOTE — PROVIDER CONTACT NOTE (OTHER) - BACKGROUND
Admitted with back pain, stage IV breast CA
Patient admitted for dolsalgia. PMH of IBS, breast cancer stage 4
Patient has DNR order

## 2025-01-29 NOTE — DISCHARGE NOTE FOR THE EXPIRED PATIENT - HOSPITAL COURSE
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 GaP team consulted for pain management and GOC. Patient transferred to the PCU for management of symptoms and EOL care. The patient  on 25 at 04:29 AM.

## 2025-01-29 NOTE — PROVIDER CONTACT NOTE (OTHER) - REASON
Patient without spontaneous respirations or pulse
Pt. reported of small dried blood around nostril, that first started last night.
patient could not void. Patient retaining >537ml

## 2025-05-02 NOTE — DISCHARGE NOTE PROVIDER - NSDCHHPTASSISTHOME_GEN_ALL_CORE
interfere with your life and cause stress. Counseling can help you understand and deal with your illness.  Your doctor: Find a doctor you trust and feel comfortable with. Be open and honest about your fears and concerns. Your doctor can help you get the right medical treatments, including counseling.  Spiritual or Yazidism groups: They can provide comfort and may be able to help you find counseling or other social support services.  Social groups: They can help you meet new people and get involved in activities you enjoy.  Community support groups: In a support group, you can talk to others who have dealt with the same problems or illness as you. You can encourage one another and learn ways to cope with tough emotions.  How can you find a support group?  Finding a support group that works for you may take time. There are many options. Some groups have a  who helps lead discussions or shares information. Others are less formal. Some meet in person, while others meet online.  Try using these resources to help you find the best support group for you.  Your doctor, health care team, or counselor.  People with the same health concern.  Your local Amish, Confucianist, Roman Catholic, or other Yazidism group.  A city, state, or national group that provides support for your health concern. Check your local library or community center for a list of these groups. Or look for information online.  Your local community, friends, and family.  Supportive relationships  A supportive relationship includes emotional support such as love, trust, and understanding, as well as advice and concrete help, such as help managing your time.  Reach out to others  Family and friends can help you. Ask them to:  Listen to you and give you encouragement. This can keep you from feeling hopeless or alone.  Help with small daily tasks or with bigger problems. A helping hand can keep you from feeling overwhelmed.  Help you manage a health problem.  Patient Needs Assistance to Leave Residence...
